# Patient Record
Sex: FEMALE | Race: WHITE | Employment: OTHER | URBAN - METROPOLITAN AREA
[De-identification: names, ages, dates, MRNs, and addresses within clinical notes are randomized per-mention and may not be internally consistent; named-entity substitution may affect disease eponyms.]

---

## 2017-01-05 ENCOUNTER — TELEPHONE (OUTPATIENT)
Dept: INTERNAL MEDICINE CLINIC | Facility: CLINIC | Age: 82
End: 2017-01-05

## 2017-01-06 RX ORDER — ALPRAZOLAM 0.25 MG/1
TABLET ORAL
Qty: 30 TABLET | Refills: 0 | Status: SHIPPED | OUTPATIENT
Start: 2017-01-06 | End: 2017-02-10

## 2017-01-06 NOTE — TELEPHONE ENCOUNTER
[facility] pharmacy is requesting a refill for  Alprazolam 0.25 mg.  Request fwd to Dr. Pricilla Iqbal

## 2017-01-24 ENCOUNTER — OFFICE VISIT (OUTPATIENT)
Dept: INTERNAL MEDICINE CLINIC | Facility: CLINIC | Age: 82
End: 2017-01-24

## 2017-01-24 VITALS
HEART RATE: 83 BPM | TEMPERATURE: 98 F | WEIGHT: 196 LBS | SYSTOLIC BLOOD PRESSURE: 127 MMHG | DIASTOLIC BLOOD PRESSURE: 76 MMHG | BODY MASS INDEX: 36 KG/M2

## 2017-01-24 DIAGNOSIS — R05.9 COUGH: Primary | ICD-10-CM

## 2017-01-24 PROCEDURE — G0463 HOSPITAL OUTPT CLINIC VISIT: HCPCS | Performed by: INTERNAL MEDICINE

## 2017-01-24 PROCEDURE — 99214 OFFICE O/P EST MOD 30 MIN: CPT | Performed by: INTERNAL MEDICINE

## 2017-01-24 RX ORDER — DOXYCYCLINE 100 MG/1
100 TABLET ORAL 2 TIMES DAILY
Qty: 14 TABLET | Refills: 0 | Status: SHIPPED | OUTPATIENT
Start: 2017-01-24 | End: 2018-01-26 | Stop reason: ALTCHOICE

## 2017-01-24 RX ORDER — ALBUTEROL SULFATE 90 UG/1
2 AEROSOL, METERED RESPIRATORY (INHALATION) EVERY 6 HOURS PRN
Qty: 1 INHALER | Refills: 0 | Status: SHIPPED | OUTPATIENT
Start: 2017-01-24 | End: 2018-08-01

## 2017-01-24 NOTE — PROGRESS NOTES
HPI:    Patient ID: Tammie Amador is a 80year old female. Cough  This is a new problem. The current episode started in the past 7 days. The problem has been unchanged. The problem occurs hourly. The cough is productive of sputum.  Associated symptoms (PREVACID) 15 MG Oral Capsule Delayed Release Take 15 mg by mouth daily. Disp:  Rfl:    Red Yeast Rice 600 MG Oral Cap Take 2 tablets by mouth daily.  Disp:  Rfl:    HYDROcodone-acetaminophen 5-325 MG Oral Tab Take one tab po at night Disp: 30 tablet Rfl: 0 week for her bronchitis. Spoke with pt and advised of above. Call back if symptoms persist/worsens. No orders of the defined types were placed in this encounter.        Meds This Visit:  No prescriptions requested or ordered in this encounter    Imaging

## 2017-01-29 RX ORDER — GABAPENTIN 300 MG/1
CAPSULE ORAL
Qty: 90 CAPSULE | Refills: 2 | Status: SHIPPED | OUTPATIENT
Start: 2017-01-29 | End: 2017-06-13

## 2017-02-08 RX ORDER — GLIPIZIDE 5 MG/1
TABLET, FILM COATED, EXTENDED RELEASE ORAL
Qty: 90 TABLET | Refills: 0 | Status: SHIPPED | OUTPATIENT
Start: 2017-02-08 | End: 2017-05-09

## 2017-02-10 RX ORDER — ALPRAZOLAM 0.25 MG/1
TABLET ORAL
Qty: 30 TABLET | Refills: 0 | Status: SHIPPED | OUTPATIENT
Start: 2017-02-10 | End: 2017-03-28

## 2017-03-27 ENCOUNTER — TELEPHONE (OUTPATIENT)
Dept: INTERNAL MEDICINE CLINIC | Facility: CLINIC | Age: 82
End: 2017-03-27

## 2017-03-27 NOTE — TELEPHONE ENCOUNTER
Current Outpatient Prescriptions:  ALPRAZOLAM 0.25 MG Oral Tab TAKE 1 TABLET BY MOUTH EVERY DAY AS NEEDED Disp: 30 tablet Rfl: 0

## 2017-03-28 RX ORDER — ALPRAZOLAM 0.25 MG/1
TABLET ORAL
Qty: 30 TABLET | Refills: 0 | Status: SHIPPED | OUTPATIENT
Start: 2017-03-28 | End: 2017-05-09

## 2017-04-11 ENCOUNTER — TELEPHONE (OUTPATIENT)
Dept: INTERNAL MEDICINE CLINIC | Facility: CLINIC | Age: 82
End: 2017-04-11

## 2017-04-11 NOTE — TELEPHONE ENCOUNTER
Had a stroke in August 2016 and was seeing Doctor sAter Davila.      Doctor Aster Davila moved to [facility] - too far for her to travel and looking for a new neurologist for patient - asking for your recommendations - please advise     Prefers [facility] or N

## 2017-04-12 NOTE — TELEPHONE ENCOUNTER
Other neurologist I had consulted before was Dr Sukumar Delacruz who is also at [facility] so we can refer her.

## 2017-04-13 ENCOUNTER — OFFICE VISIT (OUTPATIENT)
Dept: INTERNAL MEDICINE CLINIC | Facility: CLINIC | Age: 82
End: 2017-04-13

## 2017-04-13 VITALS
WEIGHT: 198 LBS | DIASTOLIC BLOOD PRESSURE: 71 MMHG | HEIGHT: 62 IN | RESPIRATION RATE: 18 BRPM | BODY MASS INDEX: 36.44 KG/M2 | TEMPERATURE: 98 F | SYSTOLIC BLOOD PRESSURE: 132 MMHG | HEART RATE: 87 BPM

## 2017-04-13 DIAGNOSIS — J18.9 PNEUMONIA, COMMUNITY ACQUIRED: Primary | ICD-10-CM

## 2017-04-13 PROCEDURE — G0463 HOSPITAL OUTPT CLINIC VISIT: HCPCS | Performed by: INTERNAL MEDICINE

## 2017-04-13 PROCEDURE — 99213 OFFICE O/P EST LOW 20 MIN: CPT | Performed by: INTERNAL MEDICINE

## 2017-04-13 NOTE — PROGRESS NOTES
HPI:    Patient ID: Rudyddlinda Peña is a 80year old female. Pneumonia  She complains of cough, difficulty breathing and shortness of breath. This is a new problem. The current episode started 1 to 4 weeks ago. The problem has been rapidly improving.  Milagro Mcintyre mouth daily. Disp: 90 tablet Rfl: 0   AmLODIPine Besylate (NORVASC) 5 MG Oral Tab Take one tab po bid Disp: 180 tablet Rfl: 1   atenolol (TENORMIN) 50 MG Oral Tab Take 50 mg by mouth daily.  Disp:  Rfl:    aspirin 81 MG Oral Tab Take 1 tablet by mouth daily Pneumonia, community acquired  (primary encounter diagnosis)  Plan: pt was treated for pneumonia at [facility] after she went to ER upon my advise with complaint of coughing,fevers and dyspea. She is feeling better and finishing course of abx.  She will also be f

## 2017-05-03 NOTE — TELEPHONE ENCOUNTER
[facility] stating that pt ill need a refill on all 4 medications. Pt is new to the [facility].      Current Outpatient Prescriptions:  ALPRAZOLAM 0.25 MG Oral Tab TAKE 1 TABLET BY MOUTH EVERY DAY AS NEEDED Disp: 30 tablet Rfl: 0   GLIPIZIDE ER 5 MG Oral Tabl

## 2017-05-08 NOTE — TELEPHONE ENCOUNTER
Pt stts she only need to have these 2 meds refilled   Pt [place] a new pharm [facility] [place]    RX Alprazolam 0.25 Mg oral tab  RX Glipizide ER 5 mg oral Tab    Please advise

## 2017-05-10 ENCOUNTER — TELEPHONE (OUTPATIENT)
Dept: FAMILY MEDICINE CLINIC | Facility: CLINIC | Age: 82
End: 2017-05-10

## 2017-05-10 RX ORDER — GLIPIZIDE 5 MG/1
TABLET, FILM COATED, EXTENDED RELEASE ORAL
Qty: 90 TABLET | Refills: 0 | Status: SHIPPED | OUTPATIENT
Start: 2017-05-10 | End: 2017-09-06

## 2017-05-10 RX ORDER — ALPRAZOLAM 0.25 MG/1
TABLET ORAL
Qty: 30 TABLET | Refills: 0 | Status: SHIPPED | OUTPATIENT
Start: 2017-05-10 | End: 2017-06-22

## 2017-05-10 NOTE — TELEPHONE ENCOUNTER
Per printed script from Dr. Raul Tavares, refill called to [facility] on file for Alprazolam 0.25 mg tab,qty # 30 with no additional refills. Phone call to patient and advised script called pharmacy. Patient states thank you.

## 2017-05-10 NOTE — TELEPHONE ENCOUNTER
Pharm is calling state in pt chart she is allergic to sulfa want to make sure it ok for pt to take GlipiZIDE ER 5 MG Oral Tablet 24 Hr  Pharm is requesting a call back

## 2017-05-10 NOTE — TELEPHONE ENCOUNTER
Dr. Silas Vasquez, Rx request for Alprazolam 0.25 mg, UNABLE to fill per protocol. Please review. Thank you.     Refill Protocol Appointment Criteria  · Appointment scheduled in the past 6 months or in the next 3 months  Recent Visits       Provider Department

## 2017-05-12 NOTE — TELEPHONE ENCOUNTER
[facility] pharmacy contacted and relayed MD message below about the Glipizide Rx question. Pharmacist states that the pt told them that she has been taking Glipizide and to disregard the call.

## 2017-05-23 ENCOUNTER — OFFICE VISIT (OUTPATIENT)
Dept: INTERNAL MEDICINE CLINIC | Facility: CLINIC | Age: 82
End: 2017-05-23

## 2017-05-23 VITALS
HEART RATE: 91 BPM | WEIGHT: 201 LBS | RESPIRATION RATE: 12 BRPM | SYSTOLIC BLOOD PRESSURE: 123 MMHG | HEIGHT: 62 IN | DIASTOLIC BLOOD PRESSURE: 76 MMHG | TEMPERATURE: 98 F | BODY MASS INDEX: 36.99 KG/M2

## 2017-05-23 DIAGNOSIS — M06.9 RHEUMATOID ARTHRITIS INVOLVING MULTIPLE SITES, UNSPECIFIED RHEUMATOID FACTOR PRESENCE: ICD-10-CM

## 2017-05-23 DIAGNOSIS — B37.0 ORAL THRUSH: Primary | ICD-10-CM

## 2017-05-23 PROCEDURE — 99214 OFFICE O/P EST MOD 30 MIN: CPT | Performed by: INTERNAL MEDICINE

## 2017-05-23 PROCEDURE — G0463 HOSPITAL OUTPT CLINIC VISIT: HCPCS | Performed by: INTERNAL MEDICINE

## 2017-05-23 RX ORDER — HYDROCODONE BITARTRATE AND ACETAMINOPHEN 5; 325 MG/1; MG/1
TABLET ORAL
Qty: 30 TABLET | Refills: 0 | Status: SHIPPED | OUTPATIENT
Start: 2017-05-23 | End: 2018-07-02

## 2017-05-23 RX ORDER — NYSTATIN 100000 [USP'U]/G
1 POWDER TOPICAL 2 TIMES DAILY
Qty: 100 G | Refills: 0 | Status: SHIPPED | OUTPATIENT
Start: 2017-05-23 | End: 2020-04-08

## 2017-05-23 NOTE — PROGRESS NOTES
HPI:    Patient ID: Beatriz Yeh is a 80year old female. Sore Throat   This is a new problem. The current episode started in the past 7 days. The problem has been unchanged. There has been no fever. The pain is moderate.  Pertinent negatives include 15 MG Oral Capsule Delayed Release Take 15 mg by mouth daily. Disp:  Rfl:    Red Yeast Rice 600 MG Oral Cap Take 2 tablets by mouth daily. Disp:  Rfl:    Doxycycline Monohydrate 100 MG Oral Tab Take 100 mg by mouth 2 (two) times daily.  Disp: 14 tablet Rfl: factor presence ([address]  Plan: RHEUMATOLOGY - EXTERNAL        Pt got upset with her current rheumatologist when she asked for pain meds given severe pain intermittently on her right knee and left shoulder since aparrently didn't want to give her pain meds an

## 2017-06-10 NOTE — TELEPHONE ENCOUNTER
Pt is calling to check status of her   Pharm called this in on Monday      Current Outpatient Prescriptions:  GABAPENTIN 300 MG Oral Cap TAKE 1 CAPSULE (300 MG TOTAL) BY MOUTH 3 (THREE) TIMES DAILY.  Disp: 90 capsule Rfl: 2

## 2017-06-13 NOTE — TELEPHONE ENCOUNTER
Pt calling to f/u on refill request. Pt is out of medication. Been waiting on refill for a week now. Needs this refill asap.

## 2017-06-13 NOTE — TELEPHONE ENCOUNTER
[facility] calling checking status of refill, pt is out of meds. Pharmacy states they have faxed request various times.

## 2017-06-13 NOTE — TELEPHONE ENCOUNTER
Refill Protocol Appointment Criteria  · Appointment scheduled in the past 6 months or in the next 3 months  Recent Visits       Provider Department Primary Dx    3 weeks ago Yue Perez MD [facility], [facility], [address], [place] Oral thrush    2 mo

## 2017-06-14 RX ORDER — GABAPENTIN 300 MG/1
CAPSULE ORAL
Qty: 90 CAPSULE | Refills: 2 | Status: SHIPPED | OUTPATIENT
Start: 2017-06-14 | End: 2017-10-13

## 2017-06-20 NOTE — TELEPHONE ENCOUNTER
[facility]illo calling to get refill on medication below. Chelsi states electronic fax failed.      Current outpatient prescriptions:   •  ALPRAZolam 0.25 MG Oral Tab, TAKE 1 TABLET BY MOUTH EVERY DAY AS NEEDED, Disp: 30 tablet, Rfl: 0

## 2017-06-22 RX ORDER — ALPRAZOLAM 0.25 MG/1
TABLET ORAL
Qty: 30 TABLET | Refills: 0 | OUTPATIENT
Start: 2017-06-22 | End: 2017-08-14

## 2017-07-25 ENCOUNTER — TELEPHONE (OUTPATIENT)
Dept: INTERNAL MEDICINE CLINIC | Facility: CLINIC | Age: 82
End: 2017-07-25

## 2017-07-25 NOTE — TELEPHONE ENCOUNTER
Form for Diabetic Shoe Program completed by Dr. Lucien Santacruz, faxed and confirmed sent. Original placed for scanning to chart.

## 2017-08-10 NOTE — TELEPHONE ENCOUNTER
Pharmacy calling to request refill for ALPRAZolam 0.25 MG Oral Tab      Current Outpatient Prescriptions:  ALPRAZolam 0.25 MG Oral Tab TAKE 1 TABLET BY MOUTH EVERY DAY AS NEEDED Disp: 30 tablet Rfl: 0

## 2017-08-11 NOTE — TELEPHONE ENCOUNTER
Pt calling to check status of refill and states out of medication and going away this weekend and need medication before she leaves.

## 2017-08-14 NOTE — TELEPHONE ENCOUNTER
Please advise on refill request.     Recent Outpatient Visits            2 months ago Oral thrush    [address] MD    Office Visit    4 months ago Pneumonia, community acquired    [address]er

## 2017-08-15 RX ORDER — ALPRAZOLAM 0.25 MG/1
TABLET ORAL
Qty: 30 TABLET | Refills: 0 | OUTPATIENT
Start: 2017-08-15 | End: 2017-09-29

## 2017-08-15 NOTE — TELEPHONE ENCOUNTER
Refill approved by Dr. Lucien Santacruz called to pharmacy for Alprazolam 0.25 mg , qty # 30 with no additional refills.

## 2017-09-01 NOTE — TELEPHONE ENCOUNTER
Pharmacy calling to request a refill for       Current Outpatient Prescriptions:  GlipiZIDE ER 5 MG Oral Tablet 24 Hr TAKE 1 TABLET BY MOUTH EVERY DAY IN THE MORNING WITH BREAKFAST Disp: 90 tablet Rfl: 0

## 2017-09-06 NOTE — TELEPHONE ENCOUNTER
Diabetes Medications  Protocol Criteria:  · Appointment scheduled in the past 6 months or the next 3 months  · A1C < 7.5 in the past 6 months  · Creatinine in the past 12 months  · Creatinine result < 1.5   Recent Outpatient Visits            3 months ago

## 2017-09-07 RX ORDER — GLIPIZIDE 5 MG/1
TABLET, FILM COATED, EXTENDED RELEASE ORAL
Qty: 90 TABLET | Refills: 0 | Status: SHIPPED | OUTPATIENT
Start: 2017-09-07 | End: 2018-01-12

## 2017-09-29 ENCOUNTER — OFFICE VISIT (OUTPATIENT)
Dept: INTERNAL MEDICINE CLINIC | Facility: CLINIC | Age: 82
End: 2017-09-29

## 2017-09-29 VITALS
RESPIRATION RATE: 12 BRPM | OXYGEN SATURATION: 96 % | HEIGHT: 61 IN | TEMPERATURE: 99 F | BODY MASS INDEX: 37.76 KG/M2 | HEART RATE: 81 BPM | SYSTOLIC BLOOD PRESSURE: 144 MMHG | WEIGHT: 200 LBS | DIASTOLIC BLOOD PRESSURE: 76 MMHG

## 2017-09-29 DIAGNOSIS — K12.0 CANKER SORE: Primary | ICD-10-CM

## 2017-09-29 DIAGNOSIS — J06.9 VIRAL UPPER RESPIRATORY TRACT INFECTION: ICD-10-CM

## 2017-09-29 PROCEDURE — 99214 OFFICE O/P EST MOD 30 MIN: CPT | Performed by: INTERNAL MEDICINE

## 2017-09-29 PROCEDURE — G0463 HOSPITAL OUTPT CLINIC VISIT: HCPCS | Performed by: INTERNAL MEDICINE

## 2017-09-29 RX ORDER — ALPRAZOLAM 0.25 MG/1
TABLET ORAL
Qty: 30 TABLET | Refills: 0 | Status: SHIPPED | OUTPATIENT
Start: 2017-09-29 | End: 2017-11-07

## 2017-09-29 NOTE — PROGRESS NOTES
HPI:    Patient ID: Javier Jose is a [de-identified]year old female. Cough   This is a new problem. The current episode started in the past 7 days. The problem has been gradually improving. The cough is productive of sputum and productive of purulent sputum.  As (two) times daily. Disp: 14 tablet Rfl: 0   Metoprolol Succinate ER 50 MG Oral Tablet 24 Hr 50 mg daily. Disp:  Rfl:    Sennosides-Docusate Sodium (SENNA PLUS) 8.6-50 MG Oral Tab Take 2 tablets by mouth daily.  Disp: 60 tablet Rfl: 1   folic acid 1 MG Ora distress. She has no wheezes. She has no rales. Abdominal: Bowel sounds are normal. She exhibits no distension. There is no tenderness. There is no rebound. Musculoskeletal: She exhibits no edema. Lymphadenopathy:     She has no cervical adenopathy.

## 2017-10-13 RX ORDER — GABAPENTIN 300 MG/1
CAPSULE ORAL
Qty: 270 CAPSULE | Refills: 1 | Status: SHIPPED | OUTPATIENT
Start: 2017-10-13 | End: 2018-02-28

## 2017-10-13 NOTE — TELEPHONE ENCOUNTER
Please advise on refill request      Refill Protocol Appointment Criteria  · Appointment scheduled in the past 6 months or in the next 3 months  Recent Outpatient Visits            2 weeks ago Gaudencio Sr, [address] [facility]

## 2017-11-07 RX ORDER — ALPRAZOLAM 0.25 MG/1
TABLET ORAL
Qty: 30 TABLET | Refills: 0 | OUTPATIENT
Start: 2017-11-07 | End: 2018-01-22

## 2017-11-08 RX ORDER — ALPRAZOLAM 0.25 MG/1
TABLET ORAL
Qty: 30 TABLET | Refills: 0 | OUTPATIENT
Start: 2017-11-08

## 2017-11-15 RX ORDER — ALPRAZOLAM 0.25 MG/1
TABLET ORAL
Qty: 30 TABLET | Refills: 0 | OUTPATIENT
Start: 2017-11-15

## 2017-11-15 NOTE — TELEPHONE ENCOUNTER
Refill authorized on 2017    Patient Information     Patient Name  Pablo Mendoza MRN  [id number] Sex  Female   1935   Order Information     Ordered Status Priority Ordering User Department   17 (none) (none) Quentin Lea MD E

## 2017-12-09 RX ORDER — GLIPIZIDE 5 MG/1
TABLET, FILM COATED, EXTENDED RELEASE ORAL
Qty: 90 TABLET | Refills: 0 | Status: SHIPPED | OUTPATIENT
Start: 2017-12-09 | End: 2018-03-10

## 2017-12-09 NOTE — TELEPHONE ENCOUNTER
Diabetes Medications  Failed protocol, please advise on prescription.  No HbA1C and Creatinine result > 6 months    Rx pended for MD approval.    Protocol Criteria:  · Appointment scheduled in the past 6 months or the next 3 months  · A1C < 7.5 in the past

## 2018-01-12 ENCOUNTER — OFFICE VISIT (OUTPATIENT)
Dept: INTERNAL MEDICINE CLINIC | Facility: CLINIC | Age: 83
End: 2018-01-12

## 2018-01-12 VITALS
WEIGHT: 204 LBS | SYSTOLIC BLOOD PRESSURE: 118 MMHG | HEIGHT: 62 IN | BODY MASS INDEX: 37.54 KG/M2 | OXYGEN SATURATION: 91 % | TEMPERATURE: 100 F | HEART RATE: 77 BPM | RESPIRATION RATE: 12 BRPM | DIASTOLIC BLOOD PRESSURE: 78 MMHG

## 2018-01-12 DIAGNOSIS — J06.9 VIRAL UPPER RESPIRATORY TRACT INFECTION: Primary | ICD-10-CM

## 2018-01-12 PROCEDURE — 99213 OFFICE O/P EST LOW 20 MIN: CPT | Performed by: INTERNAL MEDICINE

## 2018-01-12 PROCEDURE — G0463 HOSPITAL OUTPT CLINIC VISIT: HCPCS | Performed by: INTERNAL MEDICINE

## 2018-01-12 NOTE — PROGRESS NOTES
HPI:    Patient ID: Sindy Pino is a [de-identified]year old female. Cough   This is a new problem. The current episode started yesterday. The problem has been unchanged. The cough is productive of sputum.  Associated symptoms include chills, nasal congestion, p MG Oral Tablet 24 Hr 50 mg daily. Disp:  Rfl:    Sennosides-Docusate Sodium (SENNA PLUS) 8.6-50 MG Oral Tab Take 2 tablets by mouth daily. Disp: 60 tablet Rfl: 1   folic acid 1 MG Oral Tab Take 1 tablet (1 mg total) by mouth daily.  Disp: 90 tablet Rfl: 0 and breath sounds normal. No respiratory distress. She has no wheezes. She has no rales. Abdominal: Soft. Bowel sounds are normal. She exhibits no distension. Musculoskeletal: She exhibits no edema.    Lymphadenopathy:     She has no cervical adenopathy

## 2018-01-22 ENCOUNTER — TELEPHONE (OUTPATIENT)
Dept: INTERNAL MEDICINE CLINIC | Facility: CLINIC | Age: 83
End: 2018-01-22

## 2018-01-22 NOTE — TELEPHONE ENCOUNTER
Genevive calling to let Dr Bruna Meza know that she has started a plan of care today. And would like order for A1C and social working.   Gerson Chaparro also has question regarding her medication

## 2018-01-24 NOTE — TELEPHONE ENCOUNTER
Spoke with [facility], informed her per Dr. Bryan Castellano with him. Dr. Rambo Moar pt was at [facility] for Pneumonia ( has a fup appt with you 1/26).  anil harrison Pt is taking abx bactrim that was given at hospital. On those discharge papers

## 2018-01-25 ENCOUNTER — TELEPHONE (OUTPATIENT)
Dept: INTERNAL MEDICINE CLINIC | Facility: CLINIC | Age: 83
End: 2018-01-25

## 2018-01-25 RX ORDER — ALPRAZOLAM 0.25 MG/1
TABLET ORAL
Qty: 30 TABLET | Refills: 0 | OUTPATIENT
Start: 2018-01-25 | End: 2018-05-01

## 2018-01-26 ENCOUNTER — TELEPHONE (OUTPATIENT)
Dept: INTERNAL MEDICINE CLINIC | Facility: CLINIC | Age: 83
End: 2018-01-26

## 2018-01-26 ENCOUNTER — OFFICE VISIT (OUTPATIENT)
Dept: INTERNAL MEDICINE CLINIC | Facility: CLINIC | Age: 83
End: 2018-01-26

## 2018-01-26 VITALS
SYSTOLIC BLOOD PRESSURE: 130 MMHG | RESPIRATION RATE: 16 BRPM | DIASTOLIC BLOOD PRESSURE: 68 MMHG | OXYGEN SATURATION: 93 % | HEART RATE: 76 BPM | BODY MASS INDEX: 37 KG/M2 | TEMPERATURE: 98 F | WEIGHT: 202 LBS

## 2018-01-26 DIAGNOSIS — I10 ESSENTIAL HYPERTENSION: ICD-10-CM

## 2018-01-26 DIAGNOSIS — E11.9 CONTROLLED TYPE 2 DIABETES MELLITUS WITHOUT COMPLICATION, WITHOUT LONG-TERM CURRENT USE OF INSULIN (HCC): ICD-10-CM

## 2018-01-26 DIAGNOSIS — J18.9 COMMUNITY ACQUIRED PNEUMONIA, UNSPECIFIED LATERALITY: Primary | ICD-10-CM

## 2018-01-26 PROCEDURE — G0463 HOSPITAL OUTPT CLINIC VISIT: HCPCS | Performed by: INTERNAL MEDICINE

## 2018-01-26 PROCEDURE — 99214 OFFICE O/P EST MOD 30 MIN: CPT | Performed by: INTERNAL MEDICINE

## 2018-01-26 RX ORDER — SULFAMETHOXAZOLE AND TRIMETHOPRIM 800; 160 MG/1; MG/1
1 TABLET ORAL 2 TIMES DAILY
Refills: 0 | COMMUNITY
Start: 2018-01-20 | End: 2018-08-01 | Stop reason: ALTCHOICE

## 2018-01-26 RX ORDER — BENZONATATE 200 MG/1
1 CAPSULE ORAL 3 TIMES DAILY
Refills: 0 | COMMUNITY
Start: 2018-01-20 | End: 2018-07-02 | Stop reason: ALTCHOICE

## 2018-01-26 NOTE — PROGRESS NOTES
HPI:    Patient ID: Robson Thornton is a [de-identified]year old female. Pneumonia   She complains of cough and shortness of breath. This is a new problem. The current episode started 1 to 4 weeks ago. The problem has been gradually improving.  The cough is non-prod Rfl: 0   Omega-3 Fatty Acids (FISH OIL) 1200 MG Oral Capsule Delayed Release Take  by mouth. Disp:  Rfl:    aspirin 81 MG Oral Tab Take 1 tablet by mouth daily.  Disp:  Rfl:    Calcium Carb-Cholecalciferol (CALCIUM + D3) 600-200 MG-UNIT Oral Tab Take 1 tabl ASSESSMENT/PLAN:   (J18.9) Community acquired pneumonia, unspecified laterality  (primary encounter diagnosis)  Plan: XR CHEST PA + LAT CHEST (CPT=71046)        Pt already improving with current abx.  She will need ffup cxr in 4 to 6 weeks so o

## 2018-01-26 NOTE — TELEPHONE ENCOUNTER
Pt requesting ALPRAZOLAM be called into pharmacy  Out of meds    [facility]irmed [facility]  Pt requesting call to confirm once pharmacy notified

## 2018-02-07 ENCOUNTER — TELEPHONE (OUTPATIENT)
Dept: INTERNAL MEDICINE CLINIC | Facility: CLINIC | Age: 83
End: 2018-02-07

## 2018-02-16 ENCOUNTER — TELEPHONE (OUTPATIENT)
Dept: INTERNAL MEDICINE CLINIC | Facility: CLINIC | Age: 83
End: 2018-02-16

## 2018-02-28 NOTE — TELEPHONE ENCOUNTER
Pt calling checking status of refill to mail orders pharmacy. Pt states she is low on meds and needs rush if possible. Current Outpatient Prescriptions:  GABAPENTIN 300 MG Oral Cap TAKE 1 CAPSULE (300 MG TOTAL) BY MOUTH 3 (THREE) TIMES DAILY.  Disp: 2

## 2018-02-28 NOTE — TELEPHONE ENCOUNTER
LR 10/13/17 please advise  · Appointment scheduled in the past 6 months or in the next 3 months  Recent Outpatient Visits            1 month ago Community acquired pneumonia, unspecified laterality    [facility] [place], [facility], [place] [place]

## 2018-03-01 RX ORDER — GABAPENTIN 300 MG/1
CAPSULE ORAL
Qty: 270 CAPSULE | Refills: 0 | Status: SHIPPED | OUTPATIENT
Start: 2018-03-01 | End: 2018-03-26

## 2018-03-06 ENCOUNTER — TELEPHONE (OUTPATIENT)
Dept: GASTROENTEROLOGY | Facility: CLINIC | Age: 83
End: 2018-03-06

## 2018-03-07 ENCOUNTER — TELEPHONE (OUTPATIENT)
Dept: GASTROENTEROLOGY | Facility: CLINIC | Age: 83
End: 2018-03-07

## 2018-03-07 NOTE — TELEPHONE ENCOUNTER
Office note from [address] placed on my desk - I can not find that I have seen this pt in the past.  RN to investigate.

## 2018-03-08 NOTE — TELEPHONE ENCOUNTER
Dr Lilliana Valencia gave letter back to me. I contacted Dr Anna Guevara office and got fax [phone]. Faxed letter to him, with note that it came to us in error.

## 2018-03-08 NOTE — TELEPHONE ENCOUNTER
Dr Eula Galeas, I do not see where this pt has or is scheduled to see you. Pt's PCP, Dr. lEa Bella, did refer this pt to external rheumatology on 05/23/17. Possibly this was sent to you in error? Do you still have the letter?  If so, the nurses can try to inv

## 2018-03-10 RX ORDER — GLIPIZIDE 5 MG/1
TABLET, FILM COATED, EXTENDED RELEASE ORAL
Qty: 90 TABLET | Refills: 0 | Status: SHIPPED | OUTPATIENT
Start: 2018-03-10 | End: 2018-06-09

## 2018-03-26 RX ORDER — GABAPENTIN 300 MG/1
CAPSULE ORAL
Qty: 270 CAPSULE | Refills: 1 | Status: SHIPPED | OUTPATIENT
Start: 2018-03-26 | End: 2018-09-19

## 2018-05-01 RX ORDER — ALPRAZOLAM 0.25 MG/1
TABLET ORAL
Qty: 30 TABLET | Refills: 0 | OUTPATIENT
Start: 2018-05-01 | End: 2018-05-02

## 2018-05-03 RX ORDER — ALPRAZOLAM 0.25 MG/1
TABLET ORAL
Qty: 30 TABLET | Refills: 0 | OUTPATIENT
Start: 2018-05-03 | End: 2018-06-14

## 2018-06-09 RX ORDER — GLIPIZIDE 5 MG/1
TABLET, FILM COATED, EXTENDED RELEASE ORAL
Qty: 90 TABLET | Refills: 0 | Status: SHIPPED | OUTPATIENT
Start: 2018-06-09 | End: 2018-07-14

## 2018-06-15 RX ORDER — ALPRAZOLAM 0.25 MG/1
TABLET ORAL
Qty: 30 TABLET | Refills: 0 | Status: SHIPPED | OUTPATIENT
Start: 2018-06-15 | End: 2018-08-01

## 2018-06-15 NOTE — TELEPHONE ENCOUNTER
Approved refill for Alprazolam 0.25 mg tab, qty #30 with no additional refills called to [facility] as directed.

## 2018-07-02 ENCOUNTER — OFFICE VISIT (OUTPATIENT)
Dept: INTERNAL MEDICINE CLINIC | Facility: CLINIC | Age: 83
End: 2018-07-02

## 2018-07-02 VITALS
DIASTOLIC BLOOD PRESSURE: 78 MMHG | RESPIRATION RATE: 12 BRPM | HEART RATE: 80 BPM | HEIGHT: 62 IN | BODY MASS INDEX: 37.54 KG/M2 | WEIGHT: 204 LBS | SYSTOLIC BLOOD PRESSURE: 138 MMHG | TEMPERATURE: 97 F

## 2018-07-02 DIAGNOSIS — M25.512 CHRONIC LEFT SHOULDER PAIN: ICD-10-CM

## 2018-07-02 DIAGNOSIS — R09.81 NASAL CONGESTION: Primary | ICD-10-CM

## 2018-07-02 DIAGNOSIS — I10 ESSENTIAL HYPERTENSION: ICD-10-CM

## 2018-07-02 DIAGNOSIS — G89.29 CHRONIC LEFT SHOULDER PAIN: ICD-10-CM

## 2018-07-02 DIAGNOSIS — E78.2 MIXED HYPERLIPIDEMIA: ICD-10-CM

## 2018-07-02 DIAGNOSIS — E11.9 CONTROLLED TYPE 2 DIABETES MELLITUS WITHOUT COMPLICATION, WITHOUT LONG-TERM CURRENT USE OF INSULIN (HCC): ICD-10-CM

## 2018-07-02 PROCEDURE — 99214 OFFICE O/P EST MOD 30 MIN: CPT | Performed by: INTERNAL MEDICINE

## 2018-07-02 PROCEDURE — G0463 HOSPITAL OUTPT CLINIC VISIT: HCPCS | Performed by: INTERNAL MEDICINE

## 2018-07-02 RX ORDER — FLUTICASONE PROPIONATE 50 MCG
2 SPRAY, SUSPENSION (ML) NASAL DAILY
Qty: 1 BOTTLE | Refills: 0 | Status: SHIPPED | OUTPATIENT
Start: 2018-07-02 | End: 2019-06-27

## 2018-07-02 RX ORDER — VALSARTAN 80 MG/1
1 TABLET ORAL DAILY
COMMUNITY
Start: 2018-06-27 | End: 2018-08-01

## 2018-07-02 RX ORDER — HYDROCODONE BITARTRATE AND ACETAMINOPHEN 5; 325 MG/1; MG/1
TABLET ORAL
Qty: 20 TABLET | Refills: 0 | Status: SHIPPED | OUTPATIENT
Start: 2018-07-02 | End: 2020-05-13 | Stop reason: ALTCHOICE

## 2018-07-02 RX ORDER — FLUTICASONE PROPIONATE 50 MCG
2 SPRAY, SUSPENSION (ML) NASAL DAILY
Qty: 1 BOTTLE | Refills: 0 | Status: SHIPPED | OUTPATIENT
Start: 2018-07-02 | End: 2018-07-02

## 2018-07-02 NOTE — PROGRESS NOTES
HPI:    Patient ID: Nereida Silva is a [de-identified]year old female. Hypertension   This is a chronic problem. The current episode started more than 1 year ago. The problem has been gradually improving since onset. The problem is controlled.  Pertinent negatives pain is described as aching. The pain is moderate. Associated symptoms include a limited range of motion and stiffness. Exacerbated by: rom. She has tried acetaminophen for the symptoms. The treatment provided no relief.  Her past medical history is signifi (CALCIUM + D3) 600-200 MG-UNIT Oral Tab Take 1 tablet by mouth daily. Disp:  Rfl:    Red Yeast Rice 600 MG Oral Cap Take 2 tablets by mouth daily.  Disp:  Rfl:    Sulfamethoxazole-TMP -160 MG Oral Tab per tablet Take 1 tablet by mouth 2 (two) times da and no deformity. Tenderness on the left AC joint shoulder; decreased ROM of abduction/rotations. ?+moran sign   Lymphadenopathy:     She has no cervical adenopathy. Skin: She is not diaphoretic.               ASSESSMENT/PLAN:   (R09.81) Nasal congest

## 2018-07-14 ENCOUNTER — TELEPHONE (OUTPATIENT)
Dept: INTERNAL MEDICINE CLINIC | Facility: CLINIC | Age: 83
End: 2018-07-14

## 2018-07-14 RX ORDER — GLIPIZIDE 5 MG/1
TABLET, FILM COATED, EXTENDED RELEASE ORAL
Qty: 90 TABLET | Refills: 0 | Status: SHIPPED | OUTPATIENT
Start: 2018-07-14 | End: 2018-09-29

## 2018-07-18 ENCOUNTER — TELEPHONE (OUTPATIENT)
Dept: INTERNAL MEDICINE CLINIC | Facility: CLINIC | Age: 83
End: 2018-07-18

## 2018-07-26 ENCOUNTER — TELEPHONE (OUTPATIENT)
Dept: OTHER | Age: 83
End: 2018-07-26

## 2018-08-01 ENCOUNTER — PATIENT OUTREACH (OUTPATIENT)
Dept: CASE MANAGEMENT | Age: 83
End: 2018-08-01

## 2018-08-01 ENCOUNTER — OFFICE VISIT (OUTPATIENT)
Dept: INTERNAL MEDICINE CLINIC | Facility: CLINIC | Age: 83
End: 2018-08-01
Payer: MEDICARE

## 2018-08-01 VITALS
HEIGHT: 64 IN | RESPIRATION RATE: 12 BRPM | DIASTOLIC BLOOD PRESSURE: 80 MMHG | WEIGHT: 175 LBS | SYSTOLIC BLOOD PRESSURE: 134 MMHG | TEMPERATURE: 97 F | BODY MASS INDEX: 29.88 KG/M2 | HEART RATE: 69 BPM

## 2018-08-01 DIAGNOSIS — I10 ESSENTIAL HYPERTENSION: Primary | ICD-10-CM

## 2018-08-01 PROCEDURE — G0463 HOSPITAL OUTPT CLINIC VISIT: HCPCS | Performed by: INTERNAL MEDICINE

## 2018-08-01 PROCEDURE — 99214 OFFICE O/P EST MOD 30 MIN: CPT | Performed by: INTERNAL MEDICINE

## 2018-08-01 RX ORDER — AMLODIPINE BESYLATE 10 MG/1
5 TABLET ORAL 2 TIMES DAILY
COMMUNITY
Start: 2018-07-16 | End: 2020-05-13

## 2018-08-01 RX ORDER — LOSARTAN POTASSIUM 50 MG/1
50 TABLET ORAL DAILY
Qty: 90 TABLET | Refills: 0 | Status: SHIPPED | OUTPATIENT
Start: 2018-08-01 | End: 2018-10-26

## 2018-08-01 RX ORDER — PENICILLIN V POTASSIUM 500 MG/1
250 TABLET ORAL 4 TIMES DAILY
COMMUNITY
Start: 2018-07-29 | End: 2018-10-31 | Stop reason: ALTCHOICE

## 2018-08-01 RX ORDER — ALPRAZOLAM 0.25 MG/1
TABLET ORAL
Qty: 30 TABLET | Refills: 0 | Status: SHIPPED | OUTPATIENT
Start: 2018-08-01 | End: 2018-08-23

## 2018-08-01 NOTE — PROGRESS NOTES
HPI:    Patient ID: Mic Noble is a 82year old female. Patient here today for post ER ffup. Was seen in [facility] ER over the weekend with complaint of bp being too low when she measured her bp at home.  Apparently she was also feeling lightheaded at th Oral Tab Take one tab po at night prn for shoulder pain Disp: 20 tablet Rfl: 0   Fluticasone Propionate 50 MCG/ACT Nasal Suspension 2 sprays by Each Nare route daily.  Disp: 1 Bottle Rfl: 0   GABAPENTIN 300 MG Oral Cap TAKE 1 CAPSULE BY MOUTH 3  TIMES DAILY oropharyngeal exudate. Eyes: Conjunctivae are normal. Right eye exhibits no discharge. Left eye exhibits no discharge. Neck: Normal range of motion. Neck supple. No JVD present. Cardiovascular: Normal rate, regular rhythm and normal heart sounds.

## 2018-08-23 NOTE — TELEPHONE ENCOUNTER
Spoke with patient and states that she still has plenty of  Losartan that was rx last 8/1/18 from [facility], but requesting refill for Alprazolam to be sent to [facility] as well,states that she did not get the paper script when she had her LOV on 8/1/18 with Dr Lin Dominique

## 2018-08-23 NOTE — TELEPHONE ENCOUNTER
Refill request:    •  Losartan Potassium 50 MG Oral Tab, Take 1 tablet (50 mg total) by mouth daily. , Disp: 90 tablet, Rfl: 0  •  ALPRAZolam 0.25 MG Oral Tab, TAKE 1 TABLET BY MOUTH EVERY DAY AS NEEDED, Disp: 30 tablet, Rfl: 0

## 2018-08-24 RX ORDER — ALPRAZOLAM 0.25 MG/1
TABLET ORAL
Qty: 30 TABLET | Refills: 0 | Status: SHIPPED
Start: 2018-08-24 | End: 2018-11-05

## 2018-08-24 NOTE — TELEPHONE ENCOUNTER
Per Printed script from Dr. Melania Block, [facility] 0.25 mg  Tab, qty # 30. Take one tab daily as needed. No additional refills.

## 2018-09-10 ENCOUNTER — PATIENT OUTREACH (OUTPATIENT)
Dept: CASE MANAGEMENT | Age: 83
End: 2018-09-10

## 2018-09-10 RX ORDER — GLIPIZIDE 5 MG/1
TABLET, FILM COATED, EXTENDED RELEASE ORAL
Qty: 90 TABLET | Refills: 0 | Status: SHIPPED | OUTPATIENT
Start: 2018-09-10 | End: 2018-09-29 | Stop reason: SDUPTHER

## 2018-09-11 ENCOUNTER — PATIENT OUTREACH (OUTPATIENT)
Dept: CASE MANAGEMENT | Age: 83
End: 2018-09-11

## 2018-09-11 NOTE — PROGRESS NOTES
Pt. Had asked me to call her back today to talk about the CCM program to see if it might be of interest to her. She was unavailable according to the family member who answered.  He asked me to call back and leave her the VM and he would have her listen to i

## 2018-09-13 ENCOUNTER — PATIENT OUTREACH (OUTPATIENT)
Dept: CASE MANAGEMENT | Age: 83
End: 2018-09-13

## 2018-09-13 NOTE — PROGRESS NOTES
LVM telling pt I would be going on vacation when she was able to try to call me back on, so I informed her I would call her back on the 24th when I get back to the office to discuss the CCM program to see if this is something of interest to her.

## 2018-09-19 RX ORDER — GABAPENTIN 300 MG/1
CAPSULE ORAL
Qty: 270 CAPSULE | Refills: 1 | Status: SHIPPED | OUTPATIENT
Start: 2018-09-19 | End: 2019-11-22

## 2018-09-28 ENCOUNTER — PATIENT OUTREACH (OUTPATIENT)
Dept: CASE MANAGEMENT | Age: 83
End: 2018-09-28

## 2018-09-28 NOTE — PROGRESS NOTES
LVM for pt to call me back at her earliest convenience so that we might discuss if she would be of interest in the CCM program. P= Await call back.

## 2018-09-29 RX ORDER — GLIPIZIDE 5 MG/1
TABLET, FILM COATED, EXTENDED RELEASE ORAL
Qty: 90 TABLET | Refills: 0 | Status: SHIPPED | OUTPATIENT
Start: 2018-09-29 | End: 2019-03-21

## 2018-10-02 ENCOUNTER — TELEPHONE (OUTPATIENT)
Dept: INTERNAL MEDICINE CLINIC | Facility: CLINIC | Age: 83
End: 2018-10-02

## 2018-10-02 NOTE — TELEPHONE ENCOUNTER
Dr. Alexey Chapman [facility] office is requesting to have labs/urine results faxed to the his office   Pt apt is today @ 1  FAX# [phone]  Please advise

## 2018-10-17 ENCOUNTER — PATIENT OUTREACH (OUTPATIENT)
Dept: CASE MANAGEMENT | Age: 83
End: 2018-10-17

## 2018-10-19 ENCOUNTER — PATIENT OUTREACH (OUTPATIENT)
Dept: CASE MANAGEMENT | Age: 83
End: 2018-10-19

## 2018-10-19 NOTE — PROGRESS NOTES
Spoke with pt. We were to complete her initial ccm assessment and she said she said her son just got admitted back to [facility] and is in liver and kidney failure and he is not doing well.  She asked if we could postpone this initial. I did encourage to ensure s

## 2018-10-23 ENCOUNTER — PATIENT OUTREACH (OUTPATIENT)
Dept: CASE MANAGEMENT | Age: 83
End: 2018-10-23

## 2018-10-23 NOTE — PROGRESS NOTES
Spoke to pt to see how her son was doing and if she was ready to make her appt for the initial CCM. She said her son was in bad shape in the hospital and was very ill. She said she simply can't add this to her plate right now of CCM.  She said she appreciat

## 2018-10-26 RX ORDER — LOSARTAN POTASSIUM 50 MG/1
TABLET ORAL
Qty: 90 TABLET | Refills: 0 | Status: SHIPPED | OUTPATIENT
Start: 2018-10-26

## 2018-10-31 ENCOUNTER — OFFICE VISIT (OUTPATIENT)
Dept: FAMILY MEDICINE CLINIC | Facility: CLINIC | Age: 83
End: 2018-10-31
Payer: MEDICARE

## 2018-10-31 ENCOUNTER — PATIENT OUTREACH (OUTPATIENT)
Dept: CASE MANAGEMENT | Age: 83
End: 2018-10-31

## 2018-10-31 VITALS
SYSTOLIC BLOOD PRESSURE: 150 MMHG | RESPIRATION RATE: 16 BRPM | DIASTOLIC BLOOD PRESSURE: 80 MMHG | TEMPERATURE: 98 F | OXYGEN SATURATION: 98 % | HEART RATE: 80 BPM

## 2018-10-31 DIAGNOSIS — R09.81 NASAL CONGESTION: ICD-10-CM

## 2018-10-31 DIAGNOSIS — H66.90 EAR INFECTION: ICD-10-CM

## 2018-10-31 DIAGNOSIS — R05.9 COUGH: ICD-10-CM

## 2018-10-31 DIAGNOSIS — F41.1 GENERALIZED ANXIETY DISORDER: ICD-10-CM

## 2018-10-31 DIAGNOSIS — E11.9 CONTROLLED TYPE 2 DIABETES MELLITUS WITHOUT COMPLICATION, WITHOUT LONG-TERM CURRENT USE OF INSULIN (HCC): ICD-10-CM

## 2018-10-31 DIAGNOSIS — J02.9 PHARYNGITIS, UNSPECIFIED ETIOLOGY: Primary | ICD-10-CM

## 2018-10-31 PROCEDURE — 87081 CULTURE SCREEN ONLY: CPT | Performed by: NURSE PRACTITIONER

## 2018-10-31 PROCEDURE — 87880 STREP A ASSAY W/OPTIC: CPT | Performed by: NURSE PRACTITIONER

## 2018-10-31 PROCEDURE — 99202 OFFICE O/P NEW SF 15 MIN: CPT | Performed by: NURSE PRACTITIONER

## 2018-10-31 PROCEDURE — 99490 CHRNC CARE MGMT STAFF 1ST 20: CPT

## 2018-10-31 NOTE — PROGRESS NOTES
CHIEF COMPLAINT:   Patient presents with:  Sore Throat: for 3 days, has very ill family member does not want to expose them. HPI:   Dasia Galvan is a 82year old female presents to clinic with complaint of sore throat.  Patient has had for 3 days methotrexate (RHEUMATREX) 2.5 MG Oral Tab Take 2.5 mg by mouth. TAKE 6 TAB ONCE A WEEK. PATIENT TAKING EVERY Thursday. Disp:  Rfl:    Lansoprazole (PREVACID) 15 MG Oral Capsule Delayed Release Take 15 mg by mouth daily.  Disp:  Rfl:    Red Yeast Rice 600 M NECK: supple, non-tender  LUNGS: clear to auscultation bilaterally, no wheezes or rhonchi. Breathing is non labored.   CARDIO: RRR without murmur  GI: + BS's, no masses, hepatosplenomegaly, or tenderness on direct palpation  EXTREMITIES: no cyanosis, clubbi A test has been done to find out if you or your child have strep throat. Call this facility or your healthcare provider if you were not given your test results. If the test is positive for strep infection, you will need to take antibiotic medicines.  A pres Other medicine for a child: You can give your child acetaminophen for fever, fussiness, or discomfort. In babies over 7 months of age, you may use ibuprofen instead of acetaminophen.  If your child has chronic liver or kidney disease or ever had a stomach u · Don’t have close contact with people who have sore throats, colds, or other upper respiratory infections. · Don’t smoke, and stay away from secondhand smoke. · Stay up to date with of your vaccines.   Date Last Reviewed: 11/1/2017  © [id number] [employer]

## 2018-10-31 NOTE — PROGRESS NOTES
Spoke to [employer], HIPAA verified for CCM call.     Medical History  Patient Active Problem List:     Nocturnal leg cramps     Status post carotid endarterectomy     Angioedema     Mixed hyperlipidemia     Controlled type 2 diabetes mellitus without complicat time.     Generalized Anxiety Disorder= Pt. Is very anxious. She is dealing with her son who is on hospice and is in the home with her and she is the caregiver. He has liver failure.  She said the plan is for tomorrow for him to go to [facility] for placement o

## 2018-11-05 ENCOUNTER — TELEPHONE (OUTPATIENT)
Dept: OTHER | Age: 83
End: 2018-11-05

## 2018-11-05 ENCOUNTER — OFFICE VISIT (OUTPATIENT)
Dept: INTERNAL MEDICINE CLINIC | Facility: CLINIC | Age: 83
End: 2018-11-05
Payer: MEDICARE

## 2018-11-05 VITALS
TEMPERATURE: 98 F | SYSTOLIC BLOOD PRESSURE: 147 MMHG | HEIGHT: 62 IN | RESPIRATION RATE: 16 BRPM | BODY MASS INDEX: 38.64 KG/M2 | HEART RATE: 92 BPM | WEIGHT: 210 LBS | OXYGEN SATURATION: 92 % | DIASTOLIC BLOOD PRESSURE: 69 MMHG

## 2018-11-05 DIAGNOSIS — J02.9 SORE THROAT: Primary | ICD-10-CM

## 2018-11-05 DIAGNOSIS — R05.9 COUGH: ICD-10-CM

## 2018-11-05 PROCEDURE — G0463 HOSPITAL OUTPT CLINIC VISIT: HCPCS | Performed by: INTERNAL MEDICINE

## 2018-11-05 PROCEDURE — 99214 OFFICE O/P EST MOD 30 MIN: CPT | Performed by: INTERNAL MEDICINE

## 2018-11-05 RX ORDER — ALPRAZOLAM 0.25 MG/1
TABLET ORAL
Qty: 30 TABLET | Refills: 0 | Status: SHIPPED | OUTPATIENT
Start: 2018-11-05 | End: 2019-02-13

## 2018-11-05 RX ORDER — ATENOLOL 50 MG/1
50 TABLET ORAL
COMMUNITY
End: 2020-05-13

## 2018-11-05 RX ORDER — AMOXICILLIN 500 MG/1
500 TABLET, FILM COATED ORAL 3 TIMES DAILY
Qty: 30 TABLET | Refills: 0 | Status: SHIPPED | OUTPATIENT
Start: 2018-11-05 | End: 2019-02-13 | Stop reason: ALTCHOICE

## 2018-11-05 NOTE — PROGRESS NOTES
HPI:    Patient ID: Dasia Galvan is a 82year old female. Cough   This is a new problem. The current episode started in the past 7 days. The problem has been unchanged. The problem occurs every few hours. The cough is productive of sputum.  Associated aspirin 81 MG Oral Tab Take 1 tablet by mouth daily. Disp:  Rfl:    Calcium Carb-Cholecalciferol (CALCIUM + D3) 600-200 MG-UNIT Oral Tab Take 1 tablet by mouth daily. Disp:  Rfl:    methotrexate (RHEUMATREX) 2.5 MG Oral Tab Take 2.5 mg by mouth.  TAKE 6 T has no wheezes. She has no rales. Abdominal: Soft. Musculoskeletal: She exhibits no edema. Lymphadenopathy:     She has no cervical adenopathy. Skin: She is not diaphoretic.               ASSESSMENT/PLAN:   (J02.9) Sore throat  (primary encounter di

## 2018-11-05 NOTE — TELEPHONE ENCOUNTER
Pt states she was seen in [facility] 10/31/18 for sore throat, states she was informed she did not have strep throat. Pt is not feeling any better at this time. Pt describes pain as a shooting pain up in to her ear.  Also states he son passed away and she she is o

## 2018-11-12 ENCOUNTER — PATIENT OUTREACH (OUTPATIENT)
Dept: CASE MANAGEMENT | Age: 83
End: 2018-11-12

## 2018-11-12 NOTE — PROGRESS NOTES
LVM for pt to please call me back at her earliest convenience so that I might assist her to complete her monthly ccm outreach. P= Await call return.

## 2018-11-26 ENCOUNTER — PATIENT OUTREACH (OUTPATIENT)
Dept: CASE MANAGEMENT | Age: 83
End: 2018-11-26

## 2018-11-26 DIAGNOSIS — H66.90 EAR INFECTION: ICD-10-CM

## 2018-11-26 DIAGNOSIS — J02.9 SORE THROAT: ICD-10-CM

## 2018-11-26 DIAGNOSIS — R05.9 COUGH: ICD-10-CM

## 2018-11-26 DIAGNOSIS — F41.1 GENERALIZED ANXIETY DISORDER: ICD-10-CM

## 2018-11-26 DIAGNOSIS — R09.81 NASAL CONGESTION: ICD-10-CM

## 2018-11-26 NOTE — PROGRESS NOTES
Spoke to [employer], HIPAA verified for CCM call.     Medical History  Patient Active Problem List:     Nocturnal leg cramps     Status post carotid endarterectomy     Angioedema     Mixed hyperlipidemia     Controlled type 2 diabetes mellitus without complicat as this is an annual event. She said she realizes he is in a better place and free from pain. She said it was not easy watching him have the confusion etc, but she said he passed aware in hospice and she was glad for that.   She thanked me for my caring and history, and need for CCM established by May Shipman MD.

## 2018-11-30 PROCEDURE — 99490 CHRNC CARE MGMT STAFF 1ST 20: CPT

## 2018-12-17 ENCOUNTER — PATIENT OUTREACH (OUTPATIENT)
Dept: CASE MANAGEMENT | Age: 83
End: 2018-12-17

## 2018-12-17 DIAGNOSIS — F41.1 GENERALIZED ANXIETY DISORDER: ICD-10-CM

## 2018-12-17 DIAGNOSIS — M06.9 RHEUMATOID ARTHRITIS INVOLVING MULTIPLE SITES, UNSPECIFIED RHEUMATOID FACTOR PRESENCE: ICD-10-CM

## 2018-12-17 NOTE — PROGRESS NOTES
Spoke to [employer], HIPAA verified for CCM call.     Medical History  Patient Active Problem List:     Nocturnal leg cramps     Status post carotid endarterectomy     Angioedema     Mixed hyperlipidemia     Controlled type 2 diabetes mellitus without complicat loud. She said this brings her peace knowing he is there in spirit with her. She said they are working on his old bedroom and they painted and now are looking to make it a plant room.  SHe said she got so many plants for the  that she said she really where needed, and education. Provided acknowledgment and validation to patient's concerns.      Monthly Minute Total including today: 20       Physical assessment, complete health history, and need for CCM established by Elda Barron MD.

## 2018-12-18 ENCOUNTER — TELEPHONE (OUTPATIENT)
Dept: INTERNAL MEDICINE CLINIC | Facility: CLINIC | Age: 83
End: 2018-12-18

## 2018-12-18 NOTE — TELEPHONE ENCOUNTER
Order form for Diabetic Shoes, completed by Dr. Jose Angel Leyva, faxed back and confirmed sent. Original placed for scanning to chart.

## 2018-12-31 PROCEDURE — 99490 CHRNC CARE MGMT STAFF 1ST 20: CPT

## 2019-01-17 ENCOUNTER — PATIENT OUTREACH (OUTPATIENT)
Dept: CASE MANAGEMENT | Age: 84
End: 2019-01-17

## 2019-01-17 NOTE — PROGRESS NOTES
LVM for pt to call me at her earliest convenience so that we might complete her monthly ccm outreach. P= Await call return.

## 2019-01-22 ENCOUNTER — TELEPHONE (OUTPATIENT)
Dept: CASE MANAGEMENT | Age: 84
End: 2019-01-22

## 2019-01-22 ENCOUNTER — PATIENT OUTREACH (OUTPATIENT)
Dept: CASE MANAGEMENT | Age: 84
End: 2019-01-22

## 2019-01-22 DIAGNOSIS — G89.29 CHRONIC LEFT SHOULDER PAIN: ICD-10-CM

## 2019-01-22 DIAGNOSIS — M25.512 CHRONIC LEFT SHOULDER PAIN: ICD-10-CM

## 2019-01-22 DIAGNOSIS — M06.9 RHEUMATOID ARTHRITIS INVOLVING MULTIPLE SITES, UNSPECIFIED RHEUMATOID FACTOR PRESENCE: ICD-10-CM

## 2019-01-22 DIAGNOSIS — E11.9 CONTROLLED TYPE 2 DIABETES MELLITUS WITHOUT COMPLICATION, WITHOUT LONG-TERM CURRENT USE OF INSULIN (HCC): ICD-10-CM

## 2019-01-22 DIAGNOSIS — F41.1 GENERALIZED ANXIETY DISORDER: ICD-10-CM

## 2019-01-22 NOTE — TELEPHONE ENCOUNTER
Pt is still having obvious grief with the loss of her son. Tearful on the phone.  She is willing to see a therapist. Can I have her call Dr. Jose Raul Person as [facility] to her house is not too far and she would apt to be willing to go closer to her home for

## 2019-01-22 NOTE — PROGRESS NOTES
Spoke to [employer], HIPAA verified for CCM call.     Medical History  Patient Active Problem List:     Nocturnal leg cramps     Status post carotid endarterectomy     Angioedema     Mixed hyperlipidemia     Controlled type 2 diabetes mellitus without complicat see what he has to say as \" having this leaking all the time too is not fun either. \"     Generalized Anxiety Disorder/Depression- She is still anxious about her son's passing.  She said she is 82 and he was 61 and this is upsetting that she had to bury he progress and or barriers towards patients goals. Care managers interventions: see above. Time Spent This Encounter Total:25 min medical record review, telephone communication, care plan updates where needed, and education.  Provided acknowledgment a

## 2019-01-31 PROCEDURE — 99490 CHRNC CARE MGMT STAFF 1ST 20: CPT

## 2019-02-13 ENCOUNTER — PATIENT OUTREACH (OUTPATIENT)
Dept: CASE MANAGEMENT | Age: 84
End: 2019-02-13

## 2019-02-13 DIAGNOSIS — F41.1 GENERALIZED ANXIETY DISORDER: ICD-10-CM

## 2019-02-13 DIAGNOSIS — Z01.818 PREOPERATIVE GENERAL PHYSICAL EXAMINATION: ICD-10-CM

## 2019-02-13 DIAGNOSIS — M06.9 RHEUMATOID ARTHRITIS INVOLVING MULTIPLE SITES, UNSPECIFIED RHEUMATOID FACTOR PRESENCE: ICD-10-CM

## 2019-02-13 DIAGNOSIS — M25.512 CHRONIC LEFT SHOULDER PAIN: ICD-10-CM

## 2019-02-13 DIAGNOSIS — G89.29 CHRONIC LEFT SHOULDER PAIN: ICD-10-CM

## 2019-02-13 DIAGNOSIS — E11.9 CONTROLLED TYPE 2 DIABETES MELLITUS WITHOUT COMPLICATION, WITHOUT LONG-TERM CURRENT USE OF INSULIN (HCC): ICD-10-CM

## 2019-02-13 RX ORDER — MELOXICAM 15 MG/1
15 TABLET ORAL DAILY
COMMUNITY
End: 2020-05-13 | Stop reason: ALTCHOICE

## 2019-02-13 RX ORDER — POLYETHYLENE GLYCOL 3350 17 G/17G
17 POWDER, FOR SOLUTION ORAL DAILY
COMMUNITY
End: 2021-03-17 | Stop reason: ALTCHOICE

## 2019-02-13 NOTE — PROGRESS NOTES
Spoke to [employer], HIPAA verified for CCM call.     Medical History  Patient Active Problem List:     Nocturnal leg cramps     Status post carotid endarterectomy     Angioedema     Mixed hyperlipidemia     Controlled type 2 diabetes mellitus without complicat figure out what is wrong. • What steps do you think you could take to work on this? She has an appt with her ortho MD tomorrow. • My goal for next month is: (Patient Stated)  See above.    •         Patient Reported Barriers And Concerns:  She is

## 2019-02-14 RX ORDER — ALPRAZOLAM 0.25 MG/1
TABLET ORAL
Qty: 30 TABLET | Refills: 0 | Status: SHIPPED
Start: 2019-02-14 | End: 2019-03-26

## 2019-02-14 NOTE — TELEPHONE ENCOUNTER
Controlled medication pending for review. If approved needs to be called in or faxed by on-site staff.     Last Rx:11-5-18   LOV: 11-5-18

## 2019-02-15 RX ORDER — ALPRAZOLAM 0.25 MG/1
TABLET ORAL
Qty: 30 TABLET | Refills: 0 | OUTPATIENT
Start: 2019-02-15

## 2019-02-20 ENCOUNTER — PATIENT OUTREACH (OUTPATIENT)
Dept: CASE MANAGEMENT | Age: 84
End: 2019-02-20

## 2019-02-20 ENCOUNTER — TELEPHONE (OUTPATIENT)
Dept: CASE MANAGEMENT | Age: 84
End: 2019-02-20

## 2019-02-20 RX ORDER — AMOXICILLIN 500 MG/1
500 CAPSULE ORAL 3 TIMES DAILY
Qty: 21 CAPSULE | Refills: 0 | OUTPATIENT
Start: 2019-02-20 | End: 2019-02-27

## 2019-02-20 NOTE — TELEPHONE ENCOUNTER
Pt was being followed by [facility] up until the fall of 2018. They informed her that she needs to have this exposed root and tooth pulled from a hospital affliated oral surgeon because they are concerned about possible jaw breakage.  They gav

## 2019-02-20 NOTE — TELEPHONE ENCOUNTER
Spoke with pt. She verified her allergies and she is fine to take the amoxicillin. She said she will try to call [facility] for possible dentists and also [facility] as well. She is aware to call if any changes.  She is aware the medication will be sent

## 2019-02-20 NOTE — TELEPHONE ENCOUNTER
Ok to give her amoxicillin 500mg po TID for 1 week and then she needs to ffup with her dentist. Just clarify with pt that she can take amoxicillin/penicillin and doesn't have any allergy to it (pt's allergy list include cefprozil which is a cephalosporin)

## 2019-02-20 NOTE — PROGRESS NOTES
Pt called saying she has another tooth 'infection\" in her exposed root tooth. She said she has trouble eating/chewing.  She said she was told when she was a pt at [facility] [phone] that they feel she needs to seek the treatment from

## 2019-02-28 PROCEDURE — 99490 CHRNC CARE MGMT STAFF 1ST 20: CPT

## 2019-03-06 ENCOUNTER — PATIENT OUTREACH (OUTPATIENT)
Dept: CASE MANAGEMENT | Age: 84
End: 2019-03-06

## 2019-03-06 DIAGNOSIS — F41.1 GENERALIZED ANXIETY DISORDER: ICD-10-CM

## 2019-03-06 DIAGNOSIS — E11.9 CONTROLLED TYPE 2 DIABETES MELLITUS WITHOUT COMPLICATION, WITHOUT LONG-TERM CURRENT USE OF INSULIN (HCC): ICD-10-CM

## 2019-03-06 DIAGNOSIS — G89.29 CHRONIC LEFT SHOULDER PAIN: ICD-10-CM

## 2019-03-06 DIAGNOSIS — M06.9 RHEUMATOID ARTHRITIS INVOLVING MULTIPLE SITES, UNSPECIFIED RHEUMATOID FACTOR PRESENCE: ICD-10-CM

## 2019-03-06 DIAGNOSIS — M25.512 CHRONIC LEFT SHOULDER PAIN: ICD-10-CM

## 2019-03-06 NOTE — PROGRESS NOTES
Spoke to [employer], HIPAA verified for CCM call.     Medical History  Patient Active Problem List:     Nocturnal leg cramps     Status post carotid endarterectomy     Angioedema     Mixed hyperlipidemia     Controlled type 2 diabetes mellitus without complicat get her tooth taken care of and ideally without possibly breaking her jaw. • Patient reported progress toward goal: She took her ABT's. Is feeling better. She is able to wear her teeth for the first time in a long time.  She will peri

## 2019-03-22 RX ORDER — GLIPIZIDE 5 MG/1
TABLET, FILM COATED, EXTENDED RELEASE ORAL
Qty: 90 TABLET | Refills: 0 | Status: SHIPPED | OUTPATIENT
Start: 2019-03-22 | End: 2019-06-26

## 2019-03-26 ENCOUNTER — OFFICE VISIT (OUTPATIENT)
Dept: INTERNAL MEDICINE CLINIC | Facility: CLINIC | Age: 84
End: 2019-03-26
Payer: MEDICARE

## 2019-03-26 VITALS
BODY MASS INDEX: 37.91 KG/M2 | HEART RATE: 76 BPM | WEIGHT: 206 LBS | DIASTOLIC BLOOD PRESSURE: 74 MMHG | RESPIRATION RATE: 16 BRPM | TEMPERATURE: 98 F | HEIGHT: 62 IN | SYSTOLIC BLOOD PRESSURE: 136 MMHG

## 2019-03-26 DIAGNOSIS — Z00.00 MEDICARE ANNUAL WELLNESS VISIT, SUBSEQUENT: Primary | ICD-10-CM

## 2019-03-26 DIAGNOSIS — Z91.81 AT RISK FOR FALLS: ICD-10-CM

## 2019-03-26 DIAGNOSIS — F41.1 GENERALIZED ANXIETY DISORDER: ICD-10-CM

## 2019-03-26 DIAGNOSIS — I10 ESSENTIAL HYPERTENSION: ICD-10-CM

## 2019-03-26 DIAGNOSIS — M06.9 RHEUMATOID ARTHRITIS INVOLVING MULTIPLE SITES, UNSPECIFIED RHEUMATOID FACTOR PRESENCE: ICD-10-CM

## 2019-03-26 DIAGNOSIS — E78.2 MIXED HYPERLIPIDEMIA: ICD-10-CM

## 2019-03-26 DIAGNOSIS — E11.9 CONTROLLED TYPE 2 DIABETES MELLITUS WITHOUT COMPLICATION, WITHOUT LONG-TERM CURRENT USE OF INSULIN (HCC): ICD-10-CM

## 2019-03-26 PROCEDURE — G0439 PPPS, SUBSEQ VISIT: HCPCS | Performed by: INTERNAL MEDICINE

## 2019-03-26 RX ORDER — TURMERIC ROOT EXTRACT 500 MG
TABLET ORAL DAILY
COMMUNITY
End: 2020-05-13

## 2019-03-26 RX ORDER — ALPRAZOLAM 0.25 MG/1
TABLET ORAL
Qty: 30 TABLET | Refills: 0 | Status: SHIPPED | OUTPATIENT
Start: 2019-03-26 | End: 2019-06-12

## 2019-03-26 RX ORDER — OMEPRAZOLE 20 MG/1
20 CAPSULE, DELAYED RELEASE ORAL
COMMUNITY
End: 2020-05-13

## 2019-03-27 PROBLEM — J18.9 PNEUMONIA, COMMUNITY ACQUIRED: Status: RESOLVED | Noted: 2017-04-13 | Resolved: 2019-03-27

## 2019-03-27 PROBLEM — B37.0 ORAL THRUSH: Status: RESOLVED | Noted: 2017-05-23 | Resolved: 2019-03-27

## 2019-03-27 PROBLEM — R09.81 NASAL CONGESTION: Status: RESOLVED | Noted: 2018-07-02 | Resolved: 2019-03-27

## 2019-03-27 PROBLEM — R05.9 COUGH: Status: RESOLVED | Noted: 2017-01-24 | Resolved: 2019-03-27

## 2019-03-27 PROBLEM — M25.512 CHRONIC LEFT SHOULDER PAIN: Status: RESOLVED | Noted: 2018-07-02 | Resolved: 2019-03-27

## 2019-03-27 PROBLEM — G89.29 CHRONIC LEFT SHOULDER PAIN: Status: RESOLVED | Noted: 2018-07-02 | Resolved: 2019-03-27

## 2019-03-27 PROBLEM — Z91.81 AT RISK FOR FALLS: Status: ACTIVE | Noted: 2019-03-27

## 2019-03-27 PROBLEM — J02.9 SORE THROAT: Status: RESOLVED | Noted: 2018-11-05 | Resolved: 2019-03-27

## 2019-03-31 PROCEDURE — 99490 CHRNC CARE MGMT STAFF 1ST 20: CPT

## 2019-04-10 ENCOUNTER — PATIENT OUTREACH (OUTPATIENT)
Dept: CASE MANAGEMENT | Age: 84
End: 2019-04-10

## 2019-04-10 DIAGNOSIS — F41.1 GENERALIZED ANXIETY DISORDER: ICD-10-CM

## 2019-04-10 DIAGNOSIS — Z91.81 AT RISK FOR FALLS: ICD-10-CM

## 2019-04-10 DIAGNOSIS — M06.9 RHEUMATOID ARTHRITIS INVOLVING MULTIPLE SITES, UNSPECIFIED RHEUMATOID FACTOR PRESENCE: ICD-10-CM

## 2019-04-10 NOTE — PROGRESS NOTES
Spoke to [employer], HIPAA verified for CCM call.     Medical History  Patient Active Problem List:     Mixed hyperlipidemia     Controlled type 2 diabetes mellitus without complication, without long-term current use of insulin (HCC)     Generalized anxiety dis She  Fixed his room and filled it with plants. He passed away 5 months ago with liver failure. He was 62 yrs. Previous goal met: yes    New Goal (if previous met)  • What would you say is your biggest concerns about your health?  Right now is to get [facility]

## 2019-04-30 PROCEDURE — 99490 CHRNC CARE MGMT STAFF 1ST 20: CPT

## 2019-05-29 ENCOUNTER — PATIENT OUTREACH (OUTPATIENT)
Dept: CASE MANAGEMENT | Age: 84
End: 2019-05-29

## 2019-05-29 DIAGNOSIS — F41.1 GENERALIZED ANXIETY DISORDER: ICD-10-CM

## 2019-05-29 DIAGNOSIS — M06.9 RHEUMATOID ARTHRITIS INVOLVING MULTIPLE SITES, UNSPECIFIED RHEUMATOID FACTOR PRESENCE: ICD-10-CM

## 2019-05-29 DIAGNOSIS — E78.2 MIXED HYPERLIPIDEMIA: ICD-10-CM

## 2019-05-29 DIAGNOSIS — E11.9 CONTROLLED TYPE 2 DIABETES MELLITUS WITHOUT COMPLICATION, WITHOUT LONG-TERM CURRENT USE OF INSULIN (HCC): ICD-10-CM

## 2019-05-29 DIAGNOSIS — Z91.81 AT RISK FOR FALLS: ICD-10-CM

## 2019-05-29 DIAGNOSIS — I10 ESSENTIAL HYPERTENSION: ICD-10-CM

## 2019-05-29 NOTE — PROGRESS NOTES
5/29/2019  Spoke to [employer] for CCM. Introduced myself as new ccm  Verified hippa  Patient requesting I mail out my contact information.        Updates to patient care team/ comments: UTD  Patient reported changes in medications: pt reports no changes in doing it again. She states she really loved the physical therapy because they would massage her muscles and it would feel good. New Goal (if previous met)  • My goal for next month is:    My shoulder pain is my biggest concern right now.

## 2019-05-31 PROCEDURE — 99490 CHRNC CARE MGMT STAFF 1ST 20: CPT

## 2019-06-12 RX ORDER — ALPRAZOLAM 0.25 MG/1
TABLET ORAL
Qty: 30 TABLET | Refills: 0 | Status: SHIPPED
Start: 2019-06-12 | End: 2019-08-04

## 2019-06-14 ENCOUNTER — PATIENT OUTREACH (OUTPATIENT)
Dept: CASE MANAGEMENT | Age: 84
End: 2019-06-14

## 2019-06-14 NOTE — PROGRESS NOTES
Called patient for CCM monthly  Left detailed message to call back  Verified HIPPA  I will follow up with patient at a later time  Reviewed chart    Time spent this encounter: 3 mins   Total time spent this month: 3 mins

## 2019-06-27 RX ORDER — GLIPIZIDE 5 MG/1
TABLET, FILM COATED, EXTENDED RELEASE ORAL
Qty: 90 TABLET | Refills: 0 | Status: SHIPPED | OUTPATIENT
Start: 2019-06-27 | End: 2019-09-27

## 2019-06-27 NOTE — TELEPHONE ENCOUNTER
Please review; protocol failed. Pt has not f/u with completing hemoglobin A1C ordered 3/26/19. Nurses please contact pt to complete labs.     Routed to MD for review

## 2019-07-02 ENCOUNTER — PATIENT OUTREACH (OUTPATIENT)
Dept: CASE MANAGEMENT | Age: 84
End: 2019-07-02

## 2019-07-02 NOTE — PROGRESS NOTES
Called patient for CCM monthly  Left detailed message to call back  Verified HIPPA  I will follow up with patient at a later time  Reviewed chart    Time spent this encounter: 4 mins   Total time spent this month: 4 mins

## 2019-07-18 ENCOUNTER — PATIENT OUTREACH (OUTPATIENT)
Dept: CASE MANAGEMENT | Age: 84
End: 2019-07-18

## 2019-07-18 DIAGNOSIS — Z00.00 MEDICARE ANNUAL WELLNESS VISIT, SUBSEQUENT: ICD-10-CM

## 2019-07-18 DIAGNOSIS — M06.9 RHEUMATOID ARTHRITIS INVOLVING MULTIPLE SITES, UNSPECIFIED RHEUMATOID FACTOR PRESENCE: ICD-10-CM

## 2019-07-18 DIAGNOSIS — Z91.81 AT RISK FOR FALLS: ICD-10-CM

## 2019-07-18 DIAGNOSIS — F41.1 GENERALIZED ANXIETY DISORDER: ICD-10-CM

## 2019-07-18 DIAGNOSIS — E78.2 MIXED HYPERLIPIDEMIA: ICD-10-CM

## 2019-07-18 DIAGNOSIS — E11.9 CONTROLLED TYPE 2 DIABETES MELLITUS WITHOUT COMPLICATION, WITHOUT LONG-TERM CURRENT USE OF INSULIN (HCC): ICD-10-CM

## 2019-07-18 DIAGNOSIS — I10 ESSENTIAL HYPERTENSION: ICD-10-CM

## 2019-07-18 NOTE — PROGRESS NOTES
7/18/2019  Spoke to [employer] for CCM.    Verified HIPPA    Patient Active Problem List:     Mixed hyperlipidemia     Controlled type 2 diabetes mellitus without complication, without long-term current use of insulin (HCC)     Generalized anxiety disorder this soon. She is going out today to buy fruit. Encouraged summer safety tips         No future appointments.     • Active goal from previous outreach:                       Managing my shoulder pain     • Patient reported progress toward goal: ayazin

## 2019-07-19 ENCOUNTER — LAB ENCOUNTER (OUTPATIENT)
Dept: LAB | Age: 84
End: 2019-07-19
Attending: INTERNAL MEDICINE
Payer: MEDICARE

## 2019-07-19 DIAGNOSIS — E11.9 CONTROLLED TYPE 2 DIABETES MELLITUS WITHOUT COMPLICATION, WITHOUT LONG-TERM CURRENT USE OF INSULIN (HCC): ICD-10-CM

## 2019-07-19 DIAGNOSIS — E78.2 MIXED HYPERLIPIDEMIA: ICD-10-CM

## 2019-07-19 LAB
ALBUMIN SERPL-MCNC: 3.5 G/DL (ref 3.4–5)
ALBUMIN/GLOB SERPL: 1.1 {RATIO} (ref 1–2)
ALP LIVER SERPL-CCNC: 83 U/L (ref 55–142)
ALT SERPL-CCNC: 25 U/L (ref 13–56)
ANION GAP SERPL CALC-SCNC: 9 MMOL/L (ref 0–18)
AST SERPL-CCNC: 24 U/L (ref 15–37)
BILIRUB SERPL-MCNC: 0.8 MG/DL (ref 0.1–2)
BUN BLD-MCNC: 15 MG/DL (ref 7–18)
BUN/CREAT SERPL: 18.3 (ref 10–20)
CALCIUM BLD-MCNC: 9.1 MG/DL (ref 8.5–10.1)
CHLORIDE SERPL-SCNC: 111 MMOL/L (ref 98–112)
CHOLEST SMN-MCNC: 190 MG/DL (ref ?–200)
CO2 SERPL-SCNC: 26 MMOL/L (ref 21–32)
CREAT BLD-MCNC: 0.82 MG/DL (ref 0.55–1.02)
EST. AVERAGE GLUCOSE BLD GHB EST-MCNC: 117 MG/DL (ref 68–126)
GLOBULIN PLAS-MCNC: 3.2 G/DL (ref 2.8–4.4)
GLUCOSE BLD-MCNC: 88 MG/DL (ref 70–99)
HBA1C MFR BLD HPLC: 5.7 % (ref ?–5.7)
HDLC SERPL-MCNC: 54 MG/DL (ref 40–59)
LDLC SERPL CALC-MCNC: 112 MG/DL (ref ?–100)
M PROTEIN MFR SERPL ELPH: 6.7 G/DL (ref 6.4–8.2)
NONHDLC SERPL-MCNC: 136 MG/DL (ref ?–130)
OSMOLALITY SERPL CALC.SUM OF ELEC: 302 MOSM/KG (ref 275–295)
PATIENT FASTING: YES
PATIENT FASTING: YES
POTASSIUM SERPL-SCNC: 4.2 MMOL/L (ref 3.5–5.1)
SODIUM SERPL-SCNC: 146 MMOL/L (ref 136–145)
TRIGL SERPL-MCNC: 118 MG/DL (ref 30–149)
VLDLC SERPL CALC-MCNC: 24 MG/DL (ref 0–30)

## 2019-07-19 PROCEDURE — 80061 LIPID PANEL: CPT

## 2019-07-19 PROCEDURE — 80053 COMPREHEN METABOLIC PANEL: CPT

## 2019-07-19 PROCEDURE — 83036 HEMOGLOBIN GLYCOSYLATED A1C: CPT

## 2019-07-19 PROCEDURE — 36415 COLL VENOUS BLD VENIPUNCTURE: CPT

## 2019-07-31 PROCEDURE — 99490 CHRNC CARE MGMT STAFF 1ST 20: CPT

## 2019-08-05 RX ORDER — ALPRAZOLAM 0.25 MG/1
TABLET ORAL
Qty: 30 TABLET | Refills: 0 | Status: SHIPPED
Start: 2019-08-05 | End: 2019-09-19

## 2019-08-05 NOTE — TELEPHONE ENCOUNTER
Medication pended for review. If approved needs to be called in or faxed by on-site staff.      Last RX: 6/12/19    Requested Prescriptions     Pending Prescriptions Disp Refills   • ALPRAZOLAM 0.25 MG Oral Tab [Pharmacy Med Name: ALPRAZOLAM 0.25 MG TABLET]

## 2019-08-05 NOTE — TELEPHONE ENCOUNTER
Per printed script from DR. Chavez, refill called to [facility] for Alprazolam 0.25 mg,qty # 30. Take one tab by mouth daily as needed for anxiety. No additional refills.

## 2019-08-21 ENCOUNTER — PATIENT OUTREACH (OUTPATIENT)
Dept: CASE MANAGEMENT | Age: 84
End: 2019-08-21

## 2019-08-21 DIAGNOSIS — F41.1 GENERALIZED ANXIETY DISORDER: ICD-10-CM

## 2019-08-21 DIAGNOSIS — I10 ESSENTIAL HYPERTENSION: ICD-10-CM

## 2019-08-21 DIAGNOSIS — Z00.00 MEDICARE ANNUAL WELLNESS VISIT, SUBSEQUENT: ICD-10-CM

## 2019-08-21 DIAGNOSIS — E11.9 CONTROLLED TYPE 2 DIABETES MELLITUS WITHOUT COMPLICATION, WITHOUT LONG-TERM CURRENT USE OF INSULIN (HCC): ICD-10-CM

## 2019-08-21 DIAGNOSIS — E78.2 MIXED HYPERLIPIDEMIA: ICD-10-CM

## 2019-08-21 DIAGNOSIS — Z91.81 AT RISK FOR FALLS: ICD-10-CM

## 2019-08-21 DIAGNOSIS — M06.9 RHEUMATOID ARTHRITIS INVOLVING MULTIPLE SITES, UNSPECIFIED RHEUMATOID FACTOR PRESENCE: ICD-10-CM

## 2019-08-21 RX ORDER — AMLODIPINE BESYLATE 5 MG/1
1 TABLET ORAL 2 TIMES DAILY
COMMUNITY
Start: 2019-06-27 | End: 2021-01-29

## 2019-08-21 NOTE — PROGRESS NOTES
8/21/2019  Spoke to [employer] for CCM.     Verified HIPPA    Patient Active Problem List:     Mixed hyperlipidemia     Controlled type 2 diabetes mellitus without complication, without long-term current use of insulin (HCC)     Generalized anxiety disorder adding oat bran, fruit, beans, lentils and vegetables to your diet.     Cholesterol:     Lab Results   Component Value Date    CHOLEST 190 07/19/2019     Lab Results   Component Value Date    HDL 54 07/19/2019     Lab Results   Component Value Date    TRIG

## 2019-08-31 PROCEDURE — 99490 CHRNC CARE MGMT STAFF 1ST 20: CPT

## 2019-09-09 ENCOUNTER — PATIENT OUTREACH (OUTPATIENT)
Dept: CASE MANAGEMENT | Age: 84
End: 2019-09-09

## 2019-09-09 DIAGNOSIS — I10 ESSENTIAL HYPERTENSION: ICD-10-CM

## 2019-09-09 DIAGNOSIS — E78.2 MIXED HYPERLIPIDEMIA: ICD-10-CM

## 2019-09-09 DIAGNOSIS — F41.1 GENERALIZED ANXIETY DISORDER: ICD-10-CM

## 2019-09-09 DIAGNOSIS — Z00.00 MEDICARE ANNUAL WELLNESS VISIT, SUBSEQUENT: ICD-10-CM

## 2019-09-09 DIAGNOSIS — E11.9 CONTROLLED TYPE 2 DIABETES MELLITUS WITHOUT COMPLICATION, WITHOUT LONG-TERM CURRENT USE OF INSULIN (HCC): ICD-10-CM

## 2019-09-09 DIAGNOSIS — Z91.81 AT RISK FOR FALLS: ICD-10-CM

## 2019-09-09 DIAGNOSIS — M06.9 RHEUMATOID ARTHRITIS INVOLVING MULTIPLE SITES, UNSPECIFIED RHEUMATOID FACTOR PRESENCE: ICD-10-CM

## 2019-09-09 NOTE — PROGRESS NOTES
9/9/2019  Spoke to [employer] for CCM.     Verified HIPPA    Patient Active Problem List:     Mixed hyperlipidemia     Controlled type 2 diabetes mellitus without complication, without long-term current use of insulin (HCC)     Generalized anxiety disorder • Active goal from previous outreach:                       Manage my left shoulder pain before my trip to [facility]     • Patient reported progress toward goal: progressing       • Update to previous barriers:  Dr. Joanne Jerez office had contacted her and

## 2019-09-20 NOTE — TELEPHONE ENCOUNTER
Controlled medications pending for review. If approved needs to be called in or faxed by on site staff.     Last Rx: 8-5-19 # 30  LOV: 3-26-19    Requested Prescriptions     Pending Prescriptions Disp Refills   • ALPRAZOLAM 0.25 MG Oral Tab [Pharmacy Med Na

## 2019-09-21 RX ORDER — ALPRAZOLAM 0.25 MG/1
TABLET ORAL
Qty: 30 TABLET | Refills: 1 | Status: SHIPPED
Start: 2019-09-21 | End: 2019-12-21

## 2019-09-21 NOTE — TELEPHONE ENCOUNTER
Called [facility] pharmacy and left rx approval on voicemail as authorized by Dr. Bruna Meza for Alprazolam 0.25 # 30 with 1RF

## 2019-09-28 NOTE — TELEPHONE ENCOUNTER
Review pended refill request as it does not fall under a protocol.   Requested Prescriptions     Pending Prescriptions Disp Refills   • EZETIMIBE 10 MG Oral Tab [Pharmacy Med Name: EZETIMIBE  10MG  TAB] 90 tablet 0     Sig: TAKE 1 TABLET BY MOUTH  NIGHTLY

## 2019-09-30 PROCEDURE — 99490 CHRNC CARE MGMT STAFF 1ST 20: CPT

## 2019-10-01 RX ORDER — GLIPIZIDE 5 MG/1
TABLET, FILM COATED, EXTENDED RELEASE ORAL
Qty: 90 TABLET | Refills: 1 | Status: SHIPPED | OUTPATIENT
Start: 2019-10-01 | End: 2020-03-08

## 2019-10-01 RX ORDER — EZETIMIBE 10 MG/1
TABLET ORAL
Qty: 90 TABLET | Refills: 1 | Status: SHIPPED | OUTPATIENT
Start: 2019-10-01 | End: 2020-03-06

## 2019-10-23 ENCOUNTER — PATIENT OUTREACH (OUTPATIENT)
Dept: CASE MANAGEMENT | Age: 84
End: 2019-10-23

## 2019-10-23 NOTE — PROGRESS NOTES
Contacted patient introduced my self as her new CCM. Pt verbalized understanding. Relates this being a very busy morning. States she is due for labs and plans to have them drawn soon. Pt is having transportation problems.  Daughter walked into room and advi

## 2019-10-24 ENCOUNTER — PATIENT OUTREACH (OUTPATIENT)
Dept: CASE MANAGEMENT | Age: 84
End: 2019-10-24

## 2019-10-24 DIAGNOSIS — F41.1 GENERALIZED ANXIETY DISORDER: ICD-10-CM

## 2019-10-24 DIAGNOSIS — M06.9 RHEUMATOID ARTHRITIS INVOLVING MULTIPLE SITES, UNSPECIFIED RHEUMATOID FACTOR PRESENCE: ICD-10-CM

## 2019-10-24 NOTE — PROGRESS NOTES
10/24/2019  Spoke to [facility] for CCM. Updates to patient care team/ comments: None  Patient reported changes in medications: None  Med Adherence  Comment: Taking as directed    Health Maintenance: Reviewed with patient  . DEXA Scan due on 07/16/2002 Dec very anxious and scared about surgery. - Plan for overcoming all barriers: Out way pros and cons. Finalize a decision. Patient agrees to goal action plan.  yes  Self-Management Abilities (patient reported)             1= least c

## 2019-10-31 PROCEDURE — 99490 CHRNC CARE MGMT STAFF 1ST 20: CPT

## 2019-11-18 ENCOUNTER — PATIENT OUTREACH (OUTPATIENT)
Dept: CASE MANAGEMENT | Age: 84
End: 2019-11-18

## 2019-11-18 ENCOUNTER — OFFICE VISIT (OUTPATIENT)
Dept: INTERNAL MEDICINE CLINIC | Facility: CLINIC | Age: 84
End: 2019-11-18
Payer: MEDICARE

## 2019-11-18 VITALS
OXYGEN SATURATION: 96 % | TEMPERATURE: 99 F | HEART RATE: 92 BPM | BODY MASS INDEX: 38 KG/M2 | DIASTOLIC BLOOD PRESSURE: 70 MMHG | RESPIRATION RATE: 16 BRPM | HEIGHT: 62 IN | SYSTOLIC BLOOD PRESSURE: 140 MMHG

## 2019-11-18 DIAGNOSIS — R05.9 COUGH: Primary | ICD-10-CM

## 2019-11-18 PROCEDURE — G0463 HOSPITAL OUTPT CLINIC VISIT: HCPCS | Performed by: INTERNAL MEDICINE

## 2019-11-18 PROCEDURE — 99214 OFFICE O/P EST MOD 30 MIN: CPT | Performed by: INTERNAL MEDICINE

## 2019-11-18 RX ORDER — DOXYCYCLINE 100 MG/1
100 TABLET ORAL 2 TIMES DAILY
Qty: 14 TABLET | Refills: 0 | Status: SHIPPED | OUTPATIENT
Start: 2019-11-18 | End: 2020-04-21 | Stop reason: ALTCHOICE

## 2019-11-22 ENCOUNTER — PATIENT OUTREACH (OUTPATIENT)
Dept: CASE MANAGEMENT | Age: 84
End: 2019-11-22

## 2019-11-22 DIAGNOSIS — E11.9 CONTROLLED TYPE 2 DIABETES MELLITUS WITHOUT COMPLICATION, WITHOUT LONG-TERM CURRENT USE OF INSULIN (HCC): ICD-10-CM

## 2019-11-22 DIAGNOSIS — Z91.81 AT RISK FOR FALLS: ICD-10-CM

## 2019-11-22 DIAGNOSIS — I10 ESSENTIAL HYPERTENSION: ICD-10-CM

## 2019-11-22 DIAGNOSIS — F41.1 GENERALIZED ANXIETY DISORDER: ICD-10-CM

## 2019-11-22 NOTE — PROGRESS NOTES
11/22/2019  Spoke to [facility] for CCM. Updates to patient care team/ comments: UTD  Patient reported changes in medications: None  Med Adherence  Comment: Taking as directed    Health Maintenance: Reviewed with patient.   DEXA Scan due on 07/16/2002  Kate yes  Self-Management Abilities (patient reported)             1= least confident in achieving goal, 5= very confident               - confidence: 5    Care Manager Follow Up:   One month    Reason For Follow Up: review progress and or barriers towards patie

## 2019-11-23 NOTE — TELEPHONE ENCOUNTER
Review pended refill request as it does not fall under a protocol.     Last Rx: 9/18/18  LOV: 5 days ago

## 2019-11-24 RX ORDER — GABAPENTIN 300 MG/1
CAPSULE ORAL
Qty: 270 CAPSULE | Refills: 1 | Status: SHIPPED | OUTPATIENT
Start: 2019-11-24 | End: 2020-06-14

## 2019-11-28 NOTE — TELEPHONE ENCOUNTER
Called pt and lab results given. Pt understood and will stop Glipizide and call office in 3 months to request lab order.
Dr. Gilma Ellison did you get the results.
Her a1c was 5.6 so this level is normal and not diabetic. I would hold her glipizide since she may be at risk for hypoglycemia. Would recheck her a1c again in 3mos.
I had not received her lab results; pls call [facility] to get results
Pt states she had labs done 7/2 at [facility] and has not received results    Questioning if Dr Melania Block received results?
Request sent to [facility] via fax as urgent. Will await results.
Results placed on your desk for review.
28-Nov-2019 06:03:32

## 2019-11-30 PROCEDURE — 99490 CHRNC CARE MGMT STAFF 1ST 20: CPT

## 2019-12-16 ENCOUNTER — PATIENT OUTREACH (OUTPATIENT)
Dept: CASE MANAGEMENT | Age: 84
End: 2019-12-16

## 2019-12-16 NOTE — PROGRESS NOTES
Patient called states she has some intermittent diarrhea and gas. Stated her stool comes out like an explosions. Pt called requesting an appointment for tomorrow with Dr. Preethi Nguyen. declined appointment for today.  Denies having any fever, states she is

## 2019-12-17 ENCOUNTER — OFFICE VISIT (OUTPATIENT)
Dept: INTERNAL MEDICINE CLINIC | Facility: CLINIC | Age: 84
End: 2019-12-17
Payer: MEDICARE

## 2019-12-17 VITALS
SYSTOLIC BLOOD PRESSURE: 130 MMHG | RESPIRATION RATE: 12 BRPM | DIASTOLIC BLOOD PRESSURE: 80 MMHG | HEIGHT: 61 IN | WEIGHT: 207 LBS | BODY MASS INDEX: 39.08 KG/M2 | HEART RATE: 83 BPM | TEMPERATURE: 98 F

## 2019-12-17 DIAGNOSIS — R19.7 DIARRHEA OF PRESUMED INFECTIOUS ORIGIN: Primary | ICD-10-CM

## 2019-12-17 PROCEDURE — 99214 OFFICE O/P EST MOD 30 MIN: CPT | Performed by: INTERNAL MEDICINE

## 2019-12-17 PROCEDURE — G0463 HOSPITAL OUTPT CLINIC VISIT: HCPCS | Performed by: INTERNAL MEDICINE

## 2019-12-17 NOTE — PROGRESS NOTES
HPI:    Patient ID: Natali Mott is a 82year old female. Diarrhea    This is a new problem. The current episode started in the past 7 days (5 days). The problem occurs 5 to 10 times per day. The problem has been gradually improving.  The stool consis mouth.     • LOSARTAN POTASSIUM 50 MG Oral Tab TAKE 1 TABLET BY MOUTH EVERY DAY 90 tablet 0   • AmLODIPine Besylate 10 MG Oral Tab Take 5 mg by mouth 2 (two) times daily.      • Multiple Vitamins-Minerals (EYE VITAMINS) Oral Cap Take 1 capsule by mouth braulio Normocephalic and atraumatic. Right Ear: External ear normal.   Left Ear: External ear normal.   Nose: Nose normal.   Mouth/Throat: Oropharynx is clear and moist. No oropharyngeal exudate. Eyes: Pupils are equal, round, and reactive to light.  Conjuncti

## 2019-12-24 RX ORDER — ALPRAZOLAM 0.25 MG/1
TABLET ORAL
Qty: 30 TABLET | Refills: 0 | Status: SHIPPED | OUTPATIENT
Start: 2019-12-24 | End: 2020-02-17

## 2019-12-26 ENCOUNTER — TELEPHONE (OUTPATIENT)
Dept: INTERNAL MEDICINE CLINIC | Facility: CLINIC | Age: 84
End: 2019-12-26

## 2019-12-26 NOTE — TELEPHONE ENCOUNTER
Got paged by [facility] ; pt came in with coughing for 4 days,. Low grade fever;  Flu test negative. cxr some haziness lung base but is chronic so pneumonia less likely. Pt will be discharged home on oral abx and ffup in clinic.

## 2019-12-27 ENCOUNTER — NURSE TRIAGE (OUTPATIENT)
Dept: INTERNAL MEDICINE CLINIC | Facility: CLINIC | Age: 84
End: 2019-12-27

## 2019-12-27 ENCOUNTER — PATIENT OUTREACH (OUTPATIENT)
Dept: CASE MANAGEMENT | Age: 84
End: 2019-12-27
Payer: MEDICARE

## 2019-12-27 ENCOUNTER — TELEPHONE (OUTPATIENT)
Dept: CASE MANAGEMENT | Age: 84
End: 2019-12-27

## 2019-12-27 DIAGNOSIS — I25.10 CORONARY ARTERIOSCLEROSIS IN NATIVE ARTERY: ICD-10-CM

## 2019-12-27 DIAGNOSIS — M06.9 RHEUMATOID ARTHRITIS INVOLVING MULTIPLE SITES, UNSPECIFIED RHEUMATOID FACTOR PRESENCE: ICD-10-CM

## 2019-12-27 DIAGNOSIS — I10 ESSENTIAL HYPERTENSION: ICD-10-CM

## 2019-12-27 DIAGNOSIS — Z91.81 AT RISK FOR FALLS: ICD-10-CM

## 2019-12-27 PROBLEM — I35.8 AORTIC VALVE SCLEROSIS: Status: ACTIVE | Noted: 2017-07-24

## 2019-12-27 NOTE — TELEPHONE ENCOUNTER
Patient informed of provider's response/recommendations below and states will contact her orthopedic doctor or also her \"arthritis doctor. \" Advised to call back if cannot get help from them so pt can be scheduled with one of Dr Adonay Bailey partners and

## 2019-12-27 NOTE — TELEPHONE ENCOUNTER
Pt was seen at [facility] brothers yesterday diagnosed with Pneumonia started on Zpack and Cefdinir. Pt states she is feeling okay and wanted to tell Dr. Alisa Friedman.    Right arm pain reports it being severe, had x-ray done and was advised she has arthritis.

## 2019-12-27 NOTE — PROGRESS NOTES
12/27/2019  Spoke to [employer] for CCM.       Updates to patient care team/ comments: UTD  Patient reported changes in medications: Pt was started on Z-pack and Cefdinir at [facility] ED    Med Adherence: Taking as directed    Health Maintenance: Reviewe not improve. Time Spent This Encounter Total: 27 min medical record review, telephone communication, care plan updates where needed, education, goals and action plan recreation/update. Provided acknowledgment and validation to patient's concerns.    [facility]

## 2019-12-27 NOTE — TELEPHONE ENCOUNTER
I will be out of town starting tomorrow for a week. Pt will need to be seen by another provider in our clinic or call her ortho DR He instead.

## 2019-12-27 NOTE — TELEPHONE ENCOUNTER
Action Requested: Summary for Provider     []  Critical Lab, Recommendations Needed  [x] Need Additional Advice  []   FYI    []   Need Orders  [] Need Medications Sent to Pharmacy  []  Other     SUMMARY: Patient calling with complaint of right arm/shoulder

## 2019-12-31 PROCEDURE — 99490 CHRNC CARE MGMT STAFF 1ST 20: CPT

## 2020-01-09 ENCOUNTER — PATIENT OUTREACH (OUTPATIENT)
Dept: CASE MANAGEMENT | Age: 85
End: 2020-01-09
Payer: MEDICARE

## 2020-01-09 DIAGNOSIS — E11.9 CONTROLLED TYPE 2 DIABETES MELLITUS WITHOUT COMPLICATION, WITHOUT LONG-TERM CURRENT USE OF INSULIN (HCC): ICD-10-CM

## 2020-01-09 DIAGNOSIS — I10 ESSENTIAL HYPERTENSION: ICD-10-CM

## 2020-01-09 DIAGNOSIS — M06.9 RHEUMATOID ARTHRITIS INVOLVING MULTIPLE SITES, UNSPECIFIED RHEUMATOID FACTOR PRESENCE: ICD-10-CM

## 2020-01-09 NOTE — PROGRESS NOTES
1/9/2020  Spoke to [facility] for CCM. Updates to patient care team/ comments: UTD  Patient reported changes in medications: None  Med Adherence  Comment: Taking as directed    Health Maintenance: Reviewed with patient.   DEXA Scan due on 07/16/2002  Jose Total: 23 min medical record review, telephone communication, care plan updates where needed, education, goals and action plan recreation/update. Provided acknowledgment and validation to patient's concerns.    Monthly Minute Total including today: 23    Ph

## 2020-01-10 ENCOUNTER — OFFICE VISIT (OUTPATIENT)
Dept: INTERNAL MEDICINE CLINIC | Facility: CLINIC | Age: 85
End: 2020-01-10
Payer: MEDICARE

## 2020-01-10 VITALS
HEIGHT: 61 IN | BODY MASS INDEX: 39.65 KG/M2 | HEART RATE: 69 BPM | DIASTOLIC BLOOD PRESSURE: 68 MMHG | WEIGHT: 210 LBS | TEMPERATURE: 97 F | SYSTOLIC BLOOD PRESSURE: 120 MMHG

## 2020-01-10 DIAGNOSIS — M06.9 RHEUMATOID ARTHRITIS INVOLVING MULTIPLE SITES, UNSPECIFIED RHEUMATOID FACTOR PRESENCE: Primary | ICD-10-CM

## 2020-01-10 DIAGNOSIS — R06.2 WHEEZE: ICD-10-CM

## 2020-01-10 DIAGNOSIS — J18.9 PNEUMONIA DUE TO INFECTIOUS ORGANISM, UNSPECIFIED LATERALITY, UNSPECIFIED PART OF LUNG: ICD-10-CM

## 2020-01-10 PROCEDURE — G0463 HOSPITAL OUTPT CLINIC VISIT: HCPCS | Performed by: INTERNAL MEDICINE

## 2020-01-10 PROCEDURE — 99214 OFFICE O/P EST MOD 30 MIN: CPT | Performed by: INTERNAL MEDICINE

## 2020-01-10 RX ORDER — ALBUTEROL SULFATE 90 UG/1
2 AEROSOL, METERED RESPIRATORY (INHALATION) EVERY 4 HOURS PRN
Qty: 1 INHALER | Refills: 6 | Status: SHIPPED | OUTPATIENT
Start: 2020-01-10 | End: 2021-10-13 | Stop reason: ALTCHOICE

## 2020-01-10 RX ORDER — HYDROCODONE BITARTRATE AND ACETAMINOPHEN 5; 325 MG/1; MG/1
1 TABLET ORAL DAILY PRN
Qty: 20 TABLET | Refills: 0 | Status: SHIPPED | OUTPATIENT
Start: 2020-01-10 | End: 2020-05-13 | Stop reason: ALTCHOICE

## 2020-01-10 RX ORDER — METHYLPREDNISOLONE 4 MG/1
TABLET ORAL
Qty: 1 PACKAGE | Refills: 0 | Status: SHIPPED | OUTPATIENT
Start: 2020-01-10 | End: 2020-04-21 | Stop reason: ALTCHOICE

## 2020-01-10 NOTE — PROGRESS NOTES
History of Present Illness   Patient ID: Jaylin Stevens is a 82year old female. Chief Complaint: Arthritis (Woke up with a flare of her arthritis, )      Joint Pain   This is a chronic problem. The current episode started in the past 7 days.  The problem oriented to person, place, and time. She appears well-developed and well-nourished. HENT:   Head: Normocephalic and atraumatic.    Right Ear: Tympanic membrane and ear canal normal.   Left Ear: Tympanic membrane and ear canal normal.   Mouth/Throat: No po allergies:    Sulfa Antibiotics       SWELLING  Atorvastatin            MYALGIA  Irbesartan              MYALGIA  Rosuvastatin            MYALGIA  Amoxicillin             OTHER (SEE COMMENTS)    Comment:Yeast infection  Bactrim                 ITCHING  Cla • ANGIOPLASTY (CORONARY)      Angioplasty with Stent; as per NG   • COLONOSCOPY     • ELECTROCARDIOGRAM, COMPLETE  03-    SCANNED TO MEDIA TAB- 03-      Reviewed:  Social History    Tobacco Use      Smoking status: Former Smoker      Smokel Cranberry 125 MG Oral Tab Take 1 tablet by mouth daily. • Nystatin 131034 UNIT/GM External Powder Apply 1 Application topically 2 (two) times daily. 100 g 0   • Metoprolol Succinate ER 50 MG Oral Tablet 24 Hr 50 mg daily.        • folic acid 1 MG Oral T Inhalation Aero Soln; Inhale 2 puffs into the lungs every 4 (four) hours as needed for Wheezing. Dispense: 1 Inhaler; Refill: 6  - methylPREDNISolone 4 MG Oral Tablet Therapy Pack; Take as directed. Dispense: 1 Package;  Refill: 0  Plan  Likely caused by

## 2020-01-31 PROCEDURE — 99490 CHRNC CARE MGMT STAFF 1ST 20: CPT

## 2020-02-14 ENCOUNTER — PATIENT OUTREACH (OUTPATIENT)
Dept: CASE MANAGEMENT | Age: 85
End: 2020-02-14

## 2020-02-14 NOTE — PROGRESS NOTES
Contacting patient to follow up on CCM for the month.  [facility]      Time with pt -  min  Chart review - 2 min  Total time -2  min

## 2020-02-17 NOTE — TELEPHONE ENCOUNTER
Review pended refill request as it does not fall under a protocol.     Last Rx: 12/24/19  LOV: 1/10/20

## 2020-02-18 RX ORDER — ALPRAZOLAM 0.25 MG/1
TABLET ORAL
Qty: 30 TABLET | Refills: 0 | Status: SHIPPED | OUTPATIENT
Start: 2020-02-18 | End: 2020-03-29

## 2020-02-20 ENCOUNTER — PATIENT OUTREACH (OUTPATIENT)
Dept: CASE MANAGEMENT | Age: 85
End: 2020-02-20
Payer: MEDICARE

## 2020-02-20 DIAGNOSIS — E78.2 MIXED HYPERLIPIDEMIA: ICD-10-CM

## 2020-02-20 DIAGNOSIS — E11.9 CONTROLLED TYPE 2 DIABETES MELLITUS WITHOUT COMPLICATION, WITHOUT LONG-TERM CURRENT USE OF INSULIN (HCC): ICD-10-CM

## 2020-02-20 DIAGNOSIS — I10 ESSENTIAL HYPERTENSION: ICD-10-CM

## 2020-02-20 DIAGNOSIS — Z91.81 AT RISK FOR FALLS: ICD-10-CM

## 2020-02-20 NOTE — PROGRESS NOTES
2/20/2020  Spoke to [facility] for CCM. Updates to patient care team/ comments: UTD  Patient reported changes in medications: None  Med Adherence  Comment: Taking as directed    Health Maintenance:Reviewed with patient. Encouraged up dating.   DEXA Scan du frequently and avoid touching eyes,nose and mouth. Patient agrees to goal action plan.  yes  Self-Management Abilities (patient reported)             1= least confident in achieving goal, 5= very confident               - confidence: Neena White

## 2020-02-25 ENCOUNTER — OFFICE VISIT (OUTPATIENT)
Dept: INTERNAL MEDICINE CLINIC | Facility: CLINIC | Age: 85
End: 2020-02-25
Payer: MEDICARE

## 2020-02-25 VITALS
BODY MASS INDEX: 38.71 KG/M2 | SYSTOLIC BLOOD PRESSURE: 129 MMHG | WEIGHT: 205 LBS | DIASTOLIC BLOOD PRESSURE: 73 MMHG | TEMPERATURE: 99 F | HEART RATE: 87 BPM | HEIGHT: 61 IN

## 2020-02-25 DIAGNOSIS — M19.012 PRIMARY OSTEOARTHRITIS OF LEFT SHOULDER: ICD-10-CM

## 2020-02-25 DIAGNOSIS — I10 ESSENTIAL HYPERTENSION: ICD-10-CM

## 2020-02-25 DIAGNOSIS — J18.9 COMMUNITY ACQUIRED PNEUMONIA, UNSPECIFIED LATERALITY: ICD-10-CM

## 2020-02-25 DIAGNOSIS — M06.9 RHEUMATOID ARTHRITIS INVOLVING MULTIPLE SITES, UNSPECIFIED RHEUMATOID FACTOR PRESENCE: ICD-10-CM

## 2020-02-25 DIAGNOSIS — M48.062 SPINAL STENOSIS OF LUMBAR REGION WITH NEUROGENIC CLAUDICATION: ICD-10-CM

## 2020-02-25 DIAGNOSIS — E78.2 MIXED HYPERLIPIDEMIA: ICD-10-CM

## 2020-02-25 DIAGNOSIS — J10.1 INFLUENZA A: Primary | ICD-10-CM

## 2020-02-25 DIAGNOSIS — E11.9 CONTROLLED TYPE 2 DIABETES MELLITUS WITHOUT COMPLICATION, WITHOUT LONG-TERM CURRENT USE OF INSULIN (HCC): ICD-10-CM

## 2020-02-25 PROCEDURE — G0463 HOSPITAL OUTPT CLINIC VISIT: HCPCS | Performed by: INTERNAL MEDICINE

## 2020-02-25 PROCEDURE — 99214 OFFICE O/P EST MOD 30 MIN: CPT | Performed by: INTERNAL MEDICINE

## 2020-02-25 PROCEDURE — 1111F DSCHRG MED/CURRENT MED MERGE: CPT | Performed by: INTERNAL MEDICINE

## 2020-02-25 NOTE — PROGRESS NOTES
HPI:    Patient ID: Hetal Solano is a 82year old female. Patient presents today for post hospital follow-up. She was admitted at [facility] about 2 weeks ago.   At that time she presented with generalized weakness and low blood for flu and pneumonia, treated with IV abx and tamiflu, O2, steroids and nebulizers) for the symptoms. The treatment provided significant relief. Review of Systems   Constitutional: Positive for diaphoresis, fatigue and fever.    HENT: Positive for co daily.     • methotrexate (RHEUMATREX) 2.5 MG Oral Tab Take 2.5 mg by mouth. TAKE 6 TAB ONCE A WEEK. PATIENT TAKING EVERY Thursday. • Lansoprazole (PREVACID) 15 MG Oral Capsule Delayed Release Take 15 mg by mouth daily.      • Red Yeast Rice 600 MG Ora exhibits no discharge. Left eye exhibits no discharge. Neck: Normal range of motion. Neck supple. No JVD present. No thyromegaly present. Cardiovascular: Normal rate, regular rhythm and normal heart sounds.    Pulmonary/Chest: Effort normal and breath s scheduled for April.   Patient states that she has been cleared by her cardiologist for this surgery.    (M06.9) Rheumatoid arthritis involving multiple sites, unspecified rheumatoid factor presence ([address]  Plan: He is continue to be followed up by rheumatol

## 2020-02-29 PROCEDURE — 99490 CHRNC CARE MGMT STAFF 1ST 20: CPT

## 2020-03-06 RX ORDER — EZETIMIBE 10 MG/1
TABLET ORAL
Qty: 90 TABLET | Refills: 1 | Status: SHIPPED | OUTPATIENT
Start: 2020-03-06 | End: 2020-08-10

## 2020-03-06 NOTE — TELEPHONE ENCOUNTER
Glipizide ER 5 mg does not pass protocol - last A1C on 7/19/19. Please advise.          Cholesterol Medications  Protocol Criteria:  · Appointment scheduled in the past 12 months or in the next 3 months  · ALT & LDL on file in the past 12 months  · ALT re

## 2020-03-08 RX ORDER — GLIPIZIDE 5 MG/1
TABLET, FILM COATED, EXTENDED RELEASE ORAL
Qty: 90 TABLET | Refills: 1 | Status: SHIPPED | OUTPATIENT
Start: 2020-03-08 | End: 2020-08-10

## 2020-03-09 ENCOUNTER — PATIENT OUTREACH (OUTPATIENT)
Dept: CASE MANAGEMENT | Age: 85
End: 2020-03-09
Payer: MEDICARE

## 2020-03-09 DIAGNOSIS — F41.1 GENERALIZED ANXIETY DISORDER: ICD-10-CM

## 2020-03-09 DIAGNOSIS — I10 ESSENTIAL HYPERTENSION: ICD-10-CM

## 2020-03-09 DIAGNOSIS — E11.9 CONTROLLED TYPE 2 DIABETES MELLITUS WITHOUT COMPLICATION, WITHOUT LONG-TERM CURRENT USE OF INSULIN (HCC): ICD-10-CM

## 2020-03-09 NOTE — PROGRESS NOTES
3/9/2020  Spoke to [facility] for CCM. Updates to patient care team/ comments: UTD  Patient reported changes in medications: None  Med Adherence  Comment: Taking as directed    Health Maintenance: Reviewed with patient.    DEXA Scan due on 07/16/2002  Diab reported)             1= least confident in achieving goal, 5= very confident               - confidence: 5    Care Manager Follow Up: One month    Reason For Follow Up: review progress and or barriers towards patients goals.      Care managers intervention

## 2020-03-29 RX ORDER — ALPRAZOLAM 0.25 MG/1
TABLET ORAL
Qty: 30 TABLET | Refills: 0 | Status: SHIPPED | OUTPATIENT
Start: 2020-03-29 | End: 2020-05-08

## 2020-03-31 PROCEDURE — 99490 CHRNC CARE MGMT STAFF 1ST 20: CPT

## 2020-04-08 ENCOUNTER — NURSE TRIAGE (OUTPATIENT)
Dept: INTERNAL MEDICINE CLINIC | Facility: CLINIC | Age: 85
End: 2020-04-08

## 2020-04-08 RX ORDER — NYSTATIN 100000 [USP'U]/G
1 POWDER TOPICAL 2 TIMES DAILY
Qty: 100 G | Refills: 1 | Status: SHIPPED | OUTPATIENT
Start: 2020-04-08 | End: 2021-03-17 | Stop reason: ALTCHOICE

## 2020-04-08 NOTE — TELEPHONE ENCOUNTER
Action Requested: Summary for Provider     []  Critical Lab, Recommendations Needed  [] Need Additional Advice  []   FYI    []   Need Orders  [x] Need Medications Sent to Pharmacy  []  Other     SUMMARY: Requesting same powder that pt states was prescribed

## 2020-04-21 ENCOUNTER — PATIENT OUTREACH (OUTPATIENT)
Dept: CASE MANAGEMENT | Age: 85
End: 2020-04-21

## 2020-04-21 DIAGNOSIS — E11.9 CONTROLLED TYPE 2 DIABETES MELLITUS WITHOUT COMPLICATION, WITHOUT LONG-TERM CURRENT USE OF INSULIN (HCC): ICD-10-CM

## 2020-04-21 DIAGNOSIS — F41.1 GENERALIZED ANXIETY DISORDER: ICD-10-CM

## 2020-04-21 DIAGNOSIS — I10 ESSENTIAL HYPERTENSION: ICD-10-CM

## 2020-04-21 DIAGNOSIS — E78.2 MIXED HYPERLIPIDEMIA: ICD-10-CM

## 2020-04-21 RX ORDER — CARVEDILOL 12.5 MG/1
12.5 TABLET ORAL 2 TIMES DAILY WITH MEALS
COMMUNITY
End: 2020-05-13

## 2020-04-21 NOTE — PROGRESS NOTES
4/21/2020  Spoke to [employer] for CCM.       Updates to patient care team/ comments: UTD  Patient reported changes in medications: Metoprolol was stopped and replaced with Carvedilol 12.5 mg bid changes made by Cardiologist.   Med Adherence  Comment: Taking as Patient agrees to goal action plan. yes  Self-Management Abilities (patient reported)             1= least confident in achieving goal, 5= very confident               - confidence:4  Care Manager Follow Up: two weeks, f/u on depression.     Reason Fo

## 2020-04-30 PROCEDURE — 99490 CHRNC CARE MGMT STAFF 1ST 20: CPT

## 2020-05-08 RX ORDER — ALPRAZOLAM 0.25 MG/1
TABLET ORAL
Qty: 30 TABLET | Refills: 0 | Status: SHIPPED | OUTPATIENT
Start: 2020-05-08 | End: 2020-06-19

## 2020-05-12 ENCOUNTER — TELEPHONE (OUTPATIENT)
Dept: INTERNAL MEDICINE CLINIC | Facility: CLINIC | Age: 85
End: 2020-05-12

## 2020-05-12 NOTE — TELEPHONE ENCOUNTER
Patient is having left shoulder surgery on 6/3 but requires clearance. Patient requesting in person pre-surgical visit, please advise.

## 2020-05-13 ENCOUNTER — OFFICE VISIT (OUTPATIENT)
Dept: INTERNAL MEDICINE CLINIC | Facility: CLINIC | Age: 85
End: 2020-05-13
Payer: MEDICARE

## 2020-05-13 VITALS
WEIGHT: 212 LBS | HEART RATE: 83 BPM | TEMPERATURE: 97 F | DIASTOLIC BLOOD PRESSURE: 70 MMHG | OXYGEN SATURATION: 95 % | BODY MASS INDEX: 40.02 KG/M2 | SYSTOLIC BLOOD PRESSURE: 138 MMHG | HEIGHT: 61 IN

## 2020-05-13 DIAGNOSIS — I25.10 CORONARY ARTERIOSCLEROSIS IN NATIVE ARTERY: ICD-10-CM

## 2020-05-13 DIAGNOSIS — Z86.73 HISTORY OF STROKE: ICD-10-CM

## 2020-05-13 DIAGNOSIS — M06.9 RHEUMATOID ARTHRITIS INVOLVING MULTIPLE SITES, UNSPECIFIED RHEUMATOID FACTOR PRESENCE: ICD-10-CM

## 2020-05-13 DIAGNOSIS — I10 ESSENTIAL HYPERTENSION: ICD-10-CM

## 2020-05-13 DIAGNOSIS — E11.9 CONTROLLED TYPE 2 DIABETES MELLITUS WITHOUT COMPLICATION, WITHOUT LONG-TERM CURRENT USE OF INSULIN (HCC): ICD-10-CM

## 2020-05-13 DIAGNOSIS — Z01.818 PREOP GENERAL PHYSICAL EXAM: Primary | ICD-10-CM

## 2020-05-13 DIAGNOSIS — E78.2 MIXED HYPERLIPIDEMIA: ICD-10-CM

## 2020-05-13 DIAGNOSIS — M19.012 PRIMARY OSTEOARTHRITIS OF LEFT SHOULDER: ICD-10-CM

## 2020-05-13 PROCEDURE — G0463 HOSPITAL OUTPT CLINIC VISIT: HCPCS | Performed by: INTERNAL MEDICINE

## 2020-05-13 PROCEDURE — 99214 OFFICE O/P EST MOD 30 MIN: CPT | Performed by: INTERNAL MEDICINE

## 2020-05-13 RX ORDER — ACETAMINOPHEN AND CODEINE PHOSPHATE 300; 30 MG/1; MG/1
1 TABLET ORAL EVERY 8 HOURS PRN
COMMUNITY
Start: 2020-04-30 | End: 2021-04-25

## 2020-05-13 NOTE — PROGRESS NOTES
HPI:    Patient ID: Beatriz Yeh is a 82year old female. Patient presents today fore preop medical clearance as requested by Dr Eddie Ewing orthopedist for planned  Left shoulder joint replacement surgery to be done at [facility] on Dagmar 3, 2020.        Review o MG Oral Tab Take 1 tablet (1 mg total) by mouth daily. 90 tablet 0   • aspirin 81 MG Oral Tab Take 1 tablet by mouth daily. • Calcium Carb-Cholecalciferol (CALCIUM + D3) 600-200 MG-UNIT Oral Tab Take 1 tablet by mouth daily.      • methotrexate (RHEUMAT ASSESSMENT/PLAN:   (V36.053) Preop general physical exam  (primary encounter diagnosis)  Plan: pt will be getting her preop labs at [facility] and will call us for result.   her revised cardiac risk index score is 2 and risk of MACE is >1%; she had been seen b

## 2020-05-16 ENCOUNTER — TELEPHONE (OUTPATIENT)
Dept: INTERNAL MEDICINE CLINIC | Facility: CLINIC | Age: 85
End: 2020-05-16

## 2020-05-16 DIAGNOSIS — Z01.818 PREOPERATIVE TESTING: Primary | ICD-10-CM

## 2020-05-16 NOTE — TELEPHONE ENCOUNTER
Received fax from Aicha Rose and [facility]for pre-op. Patient had pre-op physical on 5/13/20. Do not see any new lab orders or EKG that form states are required.  Form for clearance placed on Dr. Tree Pinto desk to corinna

## 2020-05-19 ENCOUNTER — TELEPHONE (OUTPATIENT)
Dept: INTERNAL MEDICINE CLINIC | Facility: CLINIC | Age: 85
End: 2020-05-19

## 2020-05-19 NOTE — TELEPHONE ENCOUNTER
S/W patient who states she will have pre-op labs and EKG done at [facility]. States she is having excruciating left shoulde pain. States Dr. Nan Manzano prescribed Codeine pain med for her and told her to take Benadryl with it since she is allergic to the Codeine.  Shawanda Deluna

## 2020-05-19 NOTE — TELEPHONE ENCOUNTER
Pls check with pt if she was given orders by ortho to do her preop labs, if not, I had put in orders and she can do it at [facility] since she is doing her surgery at that hospital.

## 2020-05-19 NOTE — TELEPHONE ENCOUNTER
Please refer to 5/19/20 telephone encounter.  Patient will have pre-ops done at [facility]

## 2020-05-21 ENCOUNTER — LAB ENCOUNTER (OUTPATIENT)
Dept: LAB | Age: 85
End: 2020-05-21
Attending: INTERNAL MEDICINE
Payer: MEDICARE

## 2020-05-21 DIAGNOSIS — E78.5 HYPERLIPIDEMIA, UNSPECIFIED HYPERLIPIDEMIA TYPE: ICD-10-CM

## 2020-05-21 DIAGNOSIS — Z01.818 PREOPERATIVE TESTING: ICD-10-CM

## 2020-05-21 PROCEDURE — 80061 LIPID PANEL: CPT

## 2020-05-21 PROCEDURE — 36415 COLL VENOUS BLD VENIPUNCTURE: CPT

## 2020-05-21 PROCEDURE — 81001 URINALYSIS AUTO W/SCOPE: CPT

## 2020-05-21 PROCEDURE — 80053 COMPREHEN METABOLIC PANEL: CPT

## 2020-05-21 PROCEDURE — 85610 PROTHROMBIN TIME: CPT

## 2020-05-21 PROCEDURE — 85025 COMPLETE CBC W/AUTO DIFF WBC: CPT

## 2020-05-22 ENCOUNTER — PATIENT OUTREACH (OUTPATIENT)
Dept: CASE MANAGEMENT | Age: 85
End: 2020-05-22

## 2020-05-22 DIAGNOSIS — M06.9 RHEUMATOID ARTHRITIS INVOLVING MULTIPLE SITES, UNSPECIFIED RHEUMATOID FACTOR PRESENCE: ICD-10-CM

## 2020-05-22 DIAGNOSIS — I10 ESSENTIAL HYPERTENSION: ICD-10-CM

## 2020-05-22 DIAGNOSIS — I25.10 CORONARY ARTERIOSCLEROSIS IN NATIVE ARTERY: ICD-10-CM

## 2020-05-22 NOTE — PROGRESS NOTES
5/22/2020  Spoke to [facility] for CCM.       Updates to patient care team/ comments: UTD  Patient reported changes in medications: None  Med Adherence  Comment: Taking as directed    Health Maintenance: Reviewed and encouraged updating once public health vu pain med for surgery as she is allergist to codeine.              Patient agrees to goal action plan.  yes  Self-Management Abilities (patient reported)             1= least confident in achieving goal, 5= very confident               - confidence:4    Care

## 2020-05-27 ENCOUNTER — TELEPHONE (OUTPATIENT)
Dept: INTERNAL MEDICINE CLINIC | Facility: CLINIC | Age: 85
End: 2020-05-27

## 2020-05-27 NOTE — TELEPHONE ENCOUNTER
The hospital is the one requesting ekg so we still need to do it just to comply with their requirement.  thanks

## 2020-05-27 NOTE — TELEPHONE ENCOUNTER
Called pt and informed about EKG, I transferred her to Central scheduling to schedule an appt to have her EKG done.

## 2020-05-27 NOTE — TELEPHONE ENCOUNTER
Spoke with pt and states Dr. Mg Warren did not need an EKG and she was received her cardiac clearance. Pt was informed of lab results. Dr. Zara Gilford pls advise

## 2020-05-27 NOTE — TELEPHONE ENCOUNTER
pls call pt; she didn't do preop ekg ordered in epic. She needs to get this done. Her preop labs otherwise were good and her cholesterol levels in good range.    Also check with pt if she did get cardiac clearance from her cardiologist DR Leonor Phipps for her [facility]

## 2020-05-28 ENCOUNTER — TELEPHONE (OUTPATIENT)
Dept: INTERNAL MEDICINE CLINIC | Facility: CLINIC | Age: 85
End: 2020-05-28

## 2020-05-28 ENCOUNTER — APPOINTMENT (OUTPATIENT)
Dept: LAB | Age: 85
End: 2020-05-28
Attending: INTERNAL MEDICINE
Payer: MEDICARE

## 2020-05-28 DIAGNOSIS — Z01.818 PREOPERATIVE TESTING: ICD-10-CM

## 2020-05-28 PROCEDURE — 93005 ELECTROCARDIOGRAM TRACING: CPT

## 2020-05-28 PROCEDURE — 93010 ELECTROCARDIOGRAM REPORT: CPT | Performed by: INTERNAL MEDICINE

## 2020-05-28 NOTE — TELEPHONE ENCOUNTER
Called [facility] and left message informing her that pt had her EKG today and waiting for clearance from Dr. Jamaal Mendez

## 2020-05-28 NOTE — TELEPHONE ENCOUNTER
Myrtle Goldsmith from [facility] is requesting all tests, physical and pre surgical clearance. Patient is scheduled for surgery 6/3.     Fax  [phone]

## 2020-05-29 NOTE — TELEPHONE ENCOUNTER
April/ RN of [facility] is calling to follow up on status of Medical clearance and lab work/ EKG.     Please advise    Fax# [phone]

## 2020-05-31 PROCEDURE — 99490 CHRNC CARE MGMT STAFF 1ST 20: CPT

## 2020-06-01 ENCOUNTER — TELEPHONE (OUTPATIENT)
Dept: INTERNAL MEDICINE CLINIC | Facility: CLINIC | Age: 85
End: 2020-06-01

## 2020-06-01 NOTE — TELEPHONE ENCOUNTER
Verified pt name and . Reviewed doctor's recommendations as noted below. Advised pt to call back if symptoms worsen, pt agreed with plan of care and had no further questions at this time.

## 2020-06-01 NOTE — TELEPHONE ENCOUNTER
Spoke with pt who states she has a \"cold sore\" on her bottom lip and \"I have surgery for my shoulder on Wednesday and I don't know if it will be a big deal to the doctors because they have to get into my mouth to knock me out\"    Pt states sore is smal

## 2020-06-01 NOTE — TELEPHONE ENCOUNTER
Patient is requesting a call from Dr. Nathalia Rodríguez nurse to discuss a cold sore she has on her lip. Please advise.

## 2020-06-14 RX ORDER — GABAPENTIN 300 MG/1
CAPSULE ORAL
Qty: 270 CAPSULE | Refills: 1 | Status: SHIPPED | OUTPATIENT
Start: 2020-06-14 | End: 2021-02-14

## 2020-06-19 RX ORDER — ALPRAZOLAM 0.25 MG/1
TABLET ORAL
Qty: 30 TABLET | Refills: 0 | Status: SHIPPED | OUTPATIENT
Start: 2020-06-19 | End: 2020-07-30

## 2020-06-22 ENCOUNTER — NURSE TRIAGE (OUTPATIENT)
Dept: INTERNAL MEDICINE CLINIC | Facility: CLINIC | Age: 85
End: 2020-06-22

## 2020-06-22 DIAGNOSIS — R30.0 DYSURIA: Primary | ICD-10-CM

## 2020-06-22 RX ORDER — NITROFURANTOIN 25; 75 MG/1; MG/1
100 CAPSULE ORAL 2 TIMES DAILY
Qty: 14 CAPSULE | Refills: 0 | Status: SHIPPED | OUTPATIENT
Start: 2020-06-22 | End: 2021-04-19 | Stop reason: ALTCHOICE

## 2020-06-22 NOTE — TELEPHONE ENCOUNTER
Spoke with pt,  verified  Pt informed of MD recommendation, pt stated understanding. Pt stated a HH RN will come to see her today and she will collect the urine sample. Pt will start meds after urine sample already taken.     MEGHNA

## 2020-06-22 NOTE — TELEPHONE ENCOUNTER
Action Requested: Summary for Provider     []  Critical Lab, Recommendations Needed  [x] Need Additional Advice  []   FYI    [x]   Need Orders  [x] Need Medications Sent to Pharmacy  []  Other     SUMMARY: Patient calling with complaint of urinary urgency,

## 2020-06-22 NOTE — TELEPHONE ENCOUNTER
Ok fo ua and culture, order signed. Will start pt on macrobid 100mg po bid for one week. Start after urine sample already taken. Thanks.

## 2020-06-25 ENCOUNTER — PATIENT OUTREACH (OUTPATIENT)
Dept: CASE MANAGEMENT | Age: 85
End: 2020-06-25

## 2020-06-25 ENCOUNTER — TELEPHONE (OUTPATIENT)
Dept: INTERNAL MEDICINE CLINIC | Facility: CLINIC | Age: 85
End: 2020-06-25

## 2020-06-25 DIAGNOSIS — I25.10 CORONARY ARTERIOSCLEROSIS IN NATIVE ARTERY: ICD-10-CM

## 2020-06-25 DIAGNOSIS — I10 ESSENTIAL HYPERTENSION: ICD-10-CM

## 2020-06-25 DIAGNOSIS — E11.9 CONTROLLED TYPE 2 DIABETES MELLITUS WITHOUT COMPLICATION, WITHOUT LONG-TERM CURRENT USE OF INSULIN (HCC): ICD-10-CM

## 2020-06-25 NOTE — TELEPHONE ENCOUNTER
Urine sample was not a good sample so will not be accurate. Pt already had been started on abx last week. If her symptoms are better and resolved, will not need any further testing.

## 2020-06-25 NOTE — PROGRESS NOTES
6/25/2020  Spoke to [facility] for CCM.       Updates to patient care team/ comments: UTD  Patient reported changes in medications: None  Med Adherence  Comment: Taking as directed    Health Maintenance:Reviewed and encouraged updating once public health concer             - confidence:4     Care Manager Follow Up: One month    Reason For Follow Up: review progress and or barriers towards patients goals.      Care managers interventions: Encouraged three balanced meals along daily exercise such as going on short

## 2020-06-25 NOTE — TELEPHONE ENCOUNTER
Received UA and Cult[facility]. Placed in Dr. Granger Dress desk to review.

## 2020-06-26 NOTE — TELEPHONE ENCOUNTER
S/W patient and related Dr. Nathalia Rodríguez message below. Patient  states symptoms are much better. States she is taking antibiotic. States she will call Monday if symptoms are not gone or are worse.

## 2020-06-30 PROCEDURE — 99490 CHRNC CARE MGMT STAFF 1ST 20: CPT

## 2020-07-01 ENCOUNTER — TELEPHONE (OUTPATIENT)
Dept: INTERNAL MEDICINE CLINIC | Facility: CLINIC | Age: 85
End: 2020-07-01

## 2020-07-01 DIAGNOSIS — R30.0 DYSURIA: Primary | ICD-10-CM

## 2020-07-01 RX ORDER — CEPHALEXIN 500 MG/1
500 CAPSULE ORAL 2 TIMES DAILY
Qty: 14 CAPSULE | Refills: 0 | Status: SHIPPED | OUTPATIENT
Start: 2020-07-01 | End: 2021-04-25

## 2020-07-01 NOTE — TELEPHONE ENCOUNTER
She will need urinalysis and urine culture again. Urine collected should be clean midstream catch to make sure not contaminated (last urine sample she had given wasn't a good sample).   Once sample is given, we can put her on keflex 500mg po bid for one wee

## 2020-07-01 NOTE — TELEPHONE ENCOUNTER
Clifford Campos will send erx for keflex. Have pt call if symptoms persist. Also have pt take probiotic or yogurt when taking abx.

## 2020-07-01 NOTE — TELEPHONE ENCOUNTER
Dr. Les Arcos: patient asked if you are able to send RX instead? Patient informed of orders. Patient unable to drop off specimen. She doesn't drive and has no one to take her. [facility] discharged patient.

## 2020-07-01 NOTE — TELEPHONE ENCOUNTER
Please advise: The patient had a UTI on 6/22/20 and taken the Macrobid and the last one yesterday and also AZO. Today AZO today and continues to have burning with urination, frequency, urgency, and is starting to have an odor.     Denies any blood i

## 2020-07-10 ENCOUNTER — TELEPHONE (OUTPATIENT)
Dept: INTERNAL MEDICINE CLINIC | Facility: CLINIC | Age: 85
End: 2020-07-10

## 2020-07-10 NOTE — TELEPHONE ENCOUNTER
Pt was called and informed of Dr. Sudha Lang message below and she verbalized understanding.  Thanks

## 2020-07-10 NOTE — TELEPHONE ENCOUNTER
Yes I agree . based on cdc guidelines she isnt a candidate for testing. If she does really want to get tested though, there are testing sites she may want to try/check but with [facility], she will not qualify.    Would advise also to watch Katelyn Escalona for any sympto

## 2020-07-27 ENCOUNTER — PATIENT OUTREACH (OUTPATIENT)
Dept: CASE MANAGEMENT | Age: 85
End: 2020-07-27

## 2020-07-27 DIAGNOSIS — I25.10 CORONARY ARTERIOSCLEROSIS IN NATIVE ARTERY: ICD-10-CM

## 2020-07-27 DIAGNOSIS — I10 ESSENTIAL HYPERTENSION: ICD-10-CM

## 2020-07-27 DIAGNOSIS — E11.9 CONTROLLED TYPE 2 DIABETES MELLITUS WITHOUT COMPLICATION, WITHOUT LONG-TERM CURRENT USE OF INSULIN (HCC): ICD-10-CM

## 2020-07-27 NOTE — PROGRESS NOTES
7/27/2020  Spoke to [facility] for CCM.       Updates to patient care team/ comments: UTD  Patient reported changes in medications: None  Med Adherence  Comment: Taking as directed    Health Maintenance:Reviewed and encouraged updating once public health concer Spent This Encounter Total: 21 min medical record review, telephone communication, care plan updates where needed, education, goals and action plan recreation/update. Provided acknowledgment and validation to patient's concerns.    Monthly Minute Total incl

## 2020-07-30 RX ORDER — ALPRAZOLAM 0.25 MG/1
TABLET ORAL
Qty: 30 TABLET | Refills: 0 | Status: SHIPPED | OUTPATIENT
Start: 2020-07-30 | End: 2020-09-30

## 2020-07-31 PROCEDURE — 99490 CHRNC CARE MGMT STAFF 1ST 20: CPT

## 2020-08-10 RX ORDER — EZETIMIBE 10 MG/1
TABLET ORAL
Qty: 90 TABLET | Refills: 1 | Status: SHIPPED | OUTPATIENT
Start: 2020-08-10 | End: 2021-02-23

## 2020-08-10 RX ORDER — GLIPIZIDE 5 MG/1
TABLET, FILM COATED, EXTENDED RELEASE ORAL
Qty: 90 TABLET | Refills: 1 | Status: SHIPPED | OUTPATIENT
Start: 2020-08-10 | End: 2021-02-23

## 2020-08-18 ENCOUNTER — TELEPHONE (OUTPATIENT)
Dept: INTERNAL MEDICINE CLINIC | Facility: CLINIC | Age: 85
End: 2020-08-18

## 2020-08-18 NOTE — TELEPHONE ENCOUNTER
Pt informed per immunization record in chart, has not had flu shot here this year and is up-to-date with Prevnar 13 and pneumovax 23 . [facility] is offering free flu vaccines so will be getting it there.  Denies further questions/concerns at this time

## 2020-08-18 NOTE — TELEPHONE ENCOUNTER
Patient requesting call to discuss if she recently received flu shot and pneumonia and would it be ok to have another one if she already had one. Please advise.

## 2020-09-10 ENCOUNTER — PATIENT OUTREACH (OUTPATIENT)
Dept: CASE MANAGEMENT | Age: 85
End: 2020-09-10

## 2020-09-10 DIAGNOSIS — E11.9 CONTROLLED TYPE 2 DIABETES MELLITUS WITHOUT COMPLICATION, WITHOUT LONG-TERM CURRENT USE OF INSULIN (HCC): ICD-10-CM

## 2020-09-10 DIAGNOSIS — Z91.81 AT RISK FOR FALLS: ICD-10-CM

## 2020-09-10 DIAGNOSIS — I10 ESSENTIAL HYPERTENSION: ICD-10-CM

## 2020-09-10 DIAGNOSIS — I25.10 CORONARY ARTERIOSCLEROSIS IN NATIVE ARTERY: ICD-10-CM

## 2020-09-10 NOTE — PROGRESS NOTES
Contacted DME manny Morris waited 25 minutes on hold before it prompted me to leave a voice mail.  [facility]    Call Us [phone]  Fax [phone]    Total time -26  min  Total Monthly time-  62 min

## 2020-09-10 NOTE — PROGRESS NOTES
9/10/2020  Spoke to [facility] for CCM. Updates to patient care team/ comments: UTD  Patient reported changes in medications: None  Med Adherence  Comment: Taking as directed    Health Maintenance: Reviewed with patient.    DEXA Scan due on 07/16/2002  Kate all barriers: n/a           Patient agrees to goal action plan. yes  Self-Management Abilities (patient reported)             1= least confident in achieving goal, 5= very confident               - confidence:4     Care Manager Follow Up:  One month    Reas

## 2020-09-10 NOTE — PROGRESS NOTES
Contacted [facility] they do not supply chucks or depends.      TOTAL TIME: 3 MIN  MONTHLY TOTAL:34 MIN

## 2020-09-10 NOTE — PROGRESS NOTES
[address] below they do not supply depends or chucks.     C[address]Tyler Par[address] Final  [address]  [address], [address]  [phone]    TOTAL TIME: 3 MIN  MONTHLY TOTAL:36 MIN

## 2020-09-11 ENCOUNTER — PATIENT OUTREACH (OUTPATIENT)
Dept: CASE MANAGEMENT | Age: 85
End: 2020-09-11

## 2020-09-11 NOTE — PROGRESS NOTES
Spoke to pt advised I was not succesful locating DME that would cover liners or depends. Advised I reached out to [facility] [employer] and they six boxes of depends she may  tomorrow. Advised pt on address, pt inquired about size of depends.  Called L

## 2020-09-30 PROCEDURE — 99490 CHRNC CARE MGMT STAFF 1ST 20: CPT

## 2020-09-30 PROCEDURE — G2058 CCM ADD 20MIN: HCPCS

## 2020-09-30 RX ORDER — ALPRAZOLAM 0.25 MG/1
TABLET ORAL
Qty: 30 TABLET | Refills: 0 | Status: SHIPPED | OUTPATIENT
Start: 2020-09-30 | End: 2020-11-11

## 2020-10-19 ENCOUNTER — PATIENT OUTREACH (OUTPATIENT)
Dept: CASE MANAGEMENT | Age: 85
End: 2020-10-19

## 2020-10-19 DIAGNOSIS — I10 ESSENTIAL HYPERTENSION: ICD-10-CM

## 2020-10-19 DIAGNOSIS — I25.10 CORONARY ARTERIOSCLEROSIS IN NATIVE ARTERY: ICD-10-CM

## 2020-10-19 DIAGNOSIS — M06.9 RHEUMATOID ARTHRITIS INVOLVING MULTIPLE SITES, UNSPECIFIED WHETHER RHEUMATOID FACTOR PRESENT (HCC): ICD-10-CM

## 2020-10-19 DIAGNOSIS — E78.2 MIXED HYPERLIPIDEMIA: ICD-10-CM

## 2020-10-19 DIAGNOSIS — E11.9 CONTROLLED TYPE 2 DIABETES MELLITUS WITHOUT COMPLICATION, WITHOUT LONG-TERM CURRENT USE OF INSULIN (HCC): ICD-10-CM

## 2020-10-19 NOTE — PROGRESS NOTES
10/19/2020  Spoke to [facility] for CCM. Updates to patient care team/ comments: UTD  Patient reported changes in medications: None  Med Adherence  Comment: Taking as directed    Health Maintenance:Reviewed with pt. She declined PX at this time.   KERI Resendez advised pt ENT and will reach out to ENT as voice is very hoarse. Patient agrees to goal action plan.  yes  Self-Management Abilities (patient reported)             1= least confident in achieving goal, 5= very confident               - confidenc

## 2020-10-31 PROCEDURE — 99490 CHRNC CARE MGMT STAFF 1ST 20: CPT

## 2020-11-10 ENCOUNTER — VIRTUAL PHONE E/M (OUTPATIENT)
Dept: INTERNAL MEDICINE CLINIC | Facility: CLINIC | Age: 85
End: 2020-11-10
Payer: MEDICARE

## 2020-11-10 DIAGNOSIS — K21.9 GASTROESOPHAGEAL REFLUX DISEASE, UNSPECIFIED WHETHER ESOPHAGITIS PRESENT: ICD-10-CM

## 2020-11-10 DIAGNOSIS — Z20.822 SUSPECTED COVID-19 VIRUS INFECTION: Primary | ICD-10-CM

## 2020-11-10 PROCEDURE — 99213 OFFICE O/P EST LOW 20 MIN: CPT | Performed by: INTERNAL MEDICINE

## 2020-11-10 RX ORDER — OMEPRAZOLE 20 MG/1
20 CAPSULE, DELAYED RELEASE ORAL
Qty: 60 CAPSULE | Refills: 0 | Status: SHIPPED | OUTPATIENT
Start: 2020-11-10 | End: 2021-01-11

## 2020-11-10 NOTE — PROGRESS NOTES
Telehealth outside of [address] Verbal Consent     I conducted a telehealth visit with Benito Jordan today, 11/10/20, which was completed using two-way, real-time interactive audio and video communication.  This has been done in good zane to pr Dry []     Productive []    • Dyspnea    Yes []   No [x]   Wheezing []   • Fatigue:   Yes []     No [x]     • Myalgias: Yes []     No [x]     • Chills:  Yes []    No [x]       • Sore throat:    Yes [x]      No []      • Headache:     Yes [x]     No [] virus infection  - SARS-COV-2 BY PCR (); Future    2. Gastroesophageal reflux disease, unspecified whether esophagitis present  - omeprazole 20 MG Oral Capsule Delayed Release;  Take 1 capsule (20 mg total) by mouth every morning before breakfast.  Dis et al., 2013) failed to calculate for the following reasons:     The 2013 ASCVD risk score is only valid for ages 36 to 78    Medical History    Active Problems reviewed:  Patient Active Problem List    At risk for falls      Aortic valve sclerosis      Rhe Medications reviewed:  Current Outpatient Medications   Medication Sig Dispense Refill   • omeprazole 20 MG Oral Capsule Delayed Release Take 1 capsule (20 mg total) by mouth every morning before breakfast. 60 capsule 0   • ALPRAZolam 0.25 MG Oral Tab TAKE Red Yeast Rice 600 MG Oral Cap Take 2 tablets by mouth daily. Patient advised to call office with any questions or seek emergency care if necessary. Patient understands and agrees to follow directions and advice.       Tyrese Mcknight MD  [facility]

## 2020-11-11 ENCOUNTER — LAB ENCOUNTER (OUTPATIENT)
Dept: LAB | Age: 85
End: 2020-11-11
Attending: INTERNAL MEDICINE
Payer: MEDICARE

## 2020-11-11 DIAGNOSIS — Z20.822 EXPOSURE TO COVID-19 VIRUS: Primary | ICD-10-CM

## 2020-11-11 RX ORDER — ALPRAZOLAM 0.25 MG/1
TABLET ORAL
Qty: 30 TABLET | Refills: 0 | Status: SHIPPED | OUTPATIENT
Start: 2020-11-11 | End: 2021-01-12

## 2020-11-13 ENCOUNTER — TELEPHONE (OUTPATIENT)
Dept: INTERNAL MEDICINE CLINIC | Facility: CLINIC | Age: 85
End: 2020-11-13

## 2020-11-14 NOTE — TELEPHONE ENCOUNTER
Noted  - As of today pending test result.      FYI 88 year old male with chronic  lymphocytic leukemia complicated  with right pleural infection and recurrent pleural  effusion who came to ambulatory surgery for drainage of his complicated effusion. On putting in a pigtail, only 100cc of jelly like serosanguinous fluid was obtained. Thus the patient was admitted for the MIST protocol given the complexity of the effusion and for intravenous antibiotics. The patient recently was admitted to the hospital 4 weeks ago where he had 2 pigtail catheters placed. 1/2 grew pseudomonas in the culture. The patient has had a recurrent of the complex pleural effusion. Previously the fluid was exudative in nature and was considered either infectious etiology vs CLL.   Pt had a pigtail to right chest and removed after 6 days, when drainage was les than 100cc of fluid.   Pt remained afebrile and medically stable.  Pt was continued on augmentin upon discharge.  PT evaluated, recommended home with home PT    dispo: to home with home PT 88 year old male with chronic  lymphocytic leukemia complicated  with right pleural infection and recurrent pleural  effusion who came to ambulatory surgery for drainage of his complicated effusion. On putting in a pigtail, only 100cc of jelly like serosanguinous fluid was obtained. Thus the patient was admitted for the MIST protocol given the complexity of the effusion and for intravenous antibiotics. The patient recently was admitted to the hospital 4 weeks ago where he had a pigtail catheters placed after 1/2 grew pseudomonas in the culture. The patient has had a recurrent of the complex pleural effusion noted during outpatient visit and was scheduled for thoracentesis/CT placement for evaluation of pleural fluid. Previously the fluid was exudative in nature and was considered either infectious etiology vs CLL.   Pt had a pigtail to right chest, underwent MIST protocol. CT was removed after 6 days, when drainage was les than 100cc of fluid.   Pt remained afebrile and medically stable, complex pleural fluid was evacuate completely.  Pt was continued on augmentin upon discharge.  PT evaluated, recommended home with home PT    dispo: to home with home PT

## 2020-11-24 ENCOUNTER — TELEPHONE (OUTPATIENT)
Dept: INTERNAL MEDICINE CLINIC | Facility: CLINIC | Age: 85
End: 2020-11-24

## 2020-11-24 NOTE — TELEPHONE ENCOUNTER
Was prescribed Prednisone upon discharge from [facility] on Thursday 11/19/20. Started Prednisone on 11- (5pills total). Threw out the bottle and cannot remember the dosage. Also started a water pill and cannot find that bottle either.

## 2020-11-25 ENCOUNTER — VIRTUAL PHONE E/M (OUTPATIENT)
Dept: INTERNAL MEDICINE CLINIC | Facility: CLINIC | Age: 85
End: 2020-11-25
Payer: MEDICARE

## 2020-11-25 ENCOUNTER — PATIENT OUTREACH (OUTPATIENT)
Dept: CASE MANAGEMENT | Age: 85
End: 2020-11-25

## 2020-11-25 DIAGNOSIS — I25.10 CORONARY ARTERIOSCLEROSIS IN NATIVE ARTERY: ICD-10-CM

## 2020-11-25 DIAGNOSIS — E78.2 MIXED HYPERLIPIDEMIA: ICD-10-CM

## 2020-11-25 DIAGNOSIS — F41.1 GENERALIZED ANXIETY DISORDER: ICD-10-CM

## 2020-11-25 DIAGNOSIS — I10 ESSENTIAL HYPERTENSION: ICD-10-CM

## 2020-11-25 DIAGNOSIS — Z91.81 AT RISK FOR FALLS: ICD-10-CM

## 2020-11-25 DIAGNOSIS — E11.9 CONTROLLED TYPE 2 DIABETES MELLITUS WITHOUT COMPLICATION, WITHOUT LONG-TERM CURRENT USE OF INSULIN (HCC): ICD-10-CM

## 2020-11-25 DIAGNOSIS — J18.9 PNEUMONIA DUE TO INFECTIOUS ORGANISM, UNSPECIFIED LATERALITY, UNSPECIFIED PART OF LUNG: Primary | ICD-10-CM

## 2020-11-25 DIAGNOSIS — M06.9 RHEUMATOID ARTHRITIS INVOLVING MULTIPLE SITES, UNSPECIFIED WHETHER RHEUMATOID FACTOR PRESENT (HCC): ICD-10-CM

## 2020-11-25 PROBLEM — I73.9 PERIPHERAL VASCULAR DISEASE (HCC): Status: ACTIVE | Noted: 2020-06-30

## 2020-11-25 PROBLEM — M21.622 TAILOR'S BUNION OF BOTH FEET: Status: ACTIVE | Noted: 2020-06-30

## 2020-11-25 PROBLEM — M21.621 TAILOR'S BUNION OF BOTH FEET: Status: ACTIVE | Noted: 2020-06-30

## 2020-11-25 PROBLEM — E11.40 DIABETIC NEUROPATHY (HCC): Status: ACTIVE | Noted: 2020-06-30

## 2020-11-25 PROBLEM — I21.4 ACUTE SUBENDOCARDIAL INFARCTION, SUBSEQUENT EPISODE OF CARE (HCC): Status: ACTIVE | Noted: 2020-11-25

## 2020-11-25 PROBLEM — I72.9 ANEURYSM (HCC): Status: ACTIVE | Noted: 2020-11-25

## 2020-11-25 PROBLEM — R01.1 MURMUR: Status: ACTIVE | Noted: 2020-11-25

## 2020-11-25 PROBLEM — I73.9 PERIPHERAL VASCULAR DISEASE: Status: ACTIVE | Noted: 2020-06-30

## 2020-11-25 PROBLEM — I72.9 ANEURYSM: Status: ACTIVE | Noted: 2020-11-25

## 2020-11-25 PROCEDURE — 99442 PHONE E/M BY PHYS 11-20 MIN: CPT | Performed by: INTERNAL MEDICINE

## 2020-11-25 RX ORDER — METHYLPREDNISOLONE 4 MG/1
TABLET ORAL
COMMUNITY
Start: 2020-10-22 | End: 2021-04-25

## 2020-11-25 NOTE — PROGRESS NOTES
11/25/2020  Spoke to [facility] for CCM. Updates to patient care team/ comments: UTD  Patient reported changes in medications: None  Med Adherence  Comment: Taking as directed    Health Maintenance:  Reviewed with patient.  Encouraged updating preventative Up: review progress and or barriers towards patients goals. Care managers interventions: Te was not sent to office as pt called last night and TE was sent to PCP. Discussed Prednisone side effects with pt.  Encouraged her to follow up in office with

## 2020-11-29 NOTE — PROGRESS NOTES
HPI:    Patient ID: Nicanor Kumar is a 82year old female. This is a telemedicine visit with live, interactive v audio.       Patient understands and accepts financial responsibility for any deductible, co-insurance and/or co-pays associated with this • EZETIMIBE 10 MG Oral Tab TAKE 1 TABLET BY MOUTH  NIGHTLY 90 tablet 1   • GLIPIZIDE ER 5 MG Oral Tablet 24 Hr TAKE 1 TABLET BY MOUTH  EVERY DAY IN THE MORNING  WITH BREAKFAST 90 tablet 1   • cephALEXin 500 MG Oral Cap Take 1 capsule (500 mg total) by mo ITCHING  Clavulanic Acid         OTHER (SEE COMMENTS)    Comment:Yeast infection  Codeine                 HIVES  Cefprozil               ITCHING    Comment:Yeast infection  Lisinopril              Coughing   PHYSICAL EXAM:   Physical Exam   Constitutional: the prednisone few days ago. (I10) Essential hypertension  Plan: Continue current blood pressure medications.   CPM.    (E78.2) Mixed hyperlipidemia  Plan: Continue her current cholesterol medication.    (M06.9) Rheumatoid arthritis involving multiple si

## 2020-11-30 PROCEDURE — 99490 CHRNC CARE MGMT STAFF 1ST 20: CPT

## 2020-12-01 ENCOUNTER — PATIENT OUTREACH (OUTPATIENT)
Dept: CASE MANAGEMENT | Age: 85
End: 2020-12-01

## 2020-12-01 DIAGNOSIS — Z91.81 AT RISK FOR FALLS: ICD-10-CM

## 2020-12-01 DIAGNOSIS — E78.2 MIXED HYPERLIPIDEMIA: ICD-10-CM

## 2020-12-01 DIAGNOSIS — I10 ESSENTIAL HYPERTENSION: ICD-10-CM

## 2020-12-01 DIAGNOSIS — E11.9 CONTROLLED TYPE 2 DIABETES MELLITUS WITHOUT COMPLICATION, WITHOUT LONG-TERM CURRENT USE OF INSULIN (HCC): ICD-10-CM

## 2020-12-01 NOTE — PROGRESS NOTES
12/1/2020  Spoke to [facility] for CCM. Updates to patient care team/ comments: UTD  Patient reported changes in medications: None  Med Adherence  Comment: Taking as directed    Health Maintenance:  Reviewed with patient. Encouraged updating.    Zoster Vac my self. • Patient reported progress toward goal: No recurrent ED admissions, she completed abx and steroids from last month. • Update to previous barriers: Pt has recovered from pneumonia.      • Patient Reported New Barriers And Concerns: Hemrroid

## 2020-12-21 ENCOUNTER — NURSE TRIAGE (OUTPATIENT)
Dept: INTERNAL MEDICINE CLINIC | Facility: CLINIC | Age: 85
End: 2020-12-21

## 2020-12-21 NOTE — TELEPHONE ENCOUNTER
Action Requested: Summary for Provider     []  Critical Lab, Recommendations Needed  [x] Need Additional Advice  []   FYI    []   Need Orders  [] Need Medications Sent to Pharmacy  []  Other     SUMMARY: Patient reports pain and swelling behind ear, pain r

## 2020-12-21 NOTE — TELEPHONE ENCOUNTER
Can continue to monitor, continue heat, stretching, massage.  If worsening ok to do telephone to discuss symptoms but may need office/UC depending on if other URI symptoms exist.

## 2020-12-22 NOTE — TELEPHONE ENCOUNTER
Patient was able to to speak on the phone with  Dr. Low Brownlee on 12/21      States better today     Advised to call back with any questions/ concerns Patient verbalizes understanding and agrees.

## 2020-12-31 PROCEDURE — 99490 CHRNC CARE MGMT STAFF 1ST 20: CPT

## 2021-01-08 ENCOUNTER — LAB ENCOUNTER (OUTPATIENT)
Dept: LAB | Age: 86
End: 2021-01-08
Attending: INTERNAL MEDICINE
Payer: MEDICARE

## 2021-01-08 ENCOUNTER — OFFICE VISIT (OUTPATIENT)
Dept: INTERNAL MEDICINE CLINIC | Facility: CLINIC | Age: 86
End: 2021-01-08
Payer: MEDICARE

## 2021-01-08 VITALS
RESPIRATION RATE: 12 BRPM | WEIGHT: 206 LBS | TEMPERATURE: 98 F | SYSTOLIC BLOOD PRESSURE: 134 MMHG | BODY MASS INDEX: 38.89 KG/M2 | HEART RATE: 79 BPM | DIASTOLIC BLOOD PRESSURE: 76 MMHG | HEIGHT: 61 IN

## 2021-01-08 DIAGNOSIS — M06.9 RHEUMATOID ARTHRITIS INVOLVING MULTIPLE SITES, UNSPECIFIED WHETHER RHEUMATOID FACTOR PRESENT (HCC): ICD-10-CM

## 2021-01-08 DIAGNOSIS — Z00.00 MEDICARE ANNUAL WELLNESS VISIT, SUBSEQUENT: Primary | ICD-10-CM

## 2021-01-08 DIAGNOSIS — E78.2 MIXED HYPERLIPIDEMIA: ICD-10-CM

## 2021-01-08 DIAGNOSIS — R51.9 NONINTRACTABLE HEADACHE, UNSPECIFIED CHRONICITY PATTERN, UNSPECIFIED HEADACHE TYPE: ICD-10-CM

## 2021-01-08 DIAGNOSIS — I10 ESSENTIAL HYPERTENSION: ICD-10-CM

## 2021-01-08 DIAGNOSIS — E11.9 CONTROLLED TYPE 2 DIABETES MELLITUS WITHOUT COMPLICATION, WITHOUT LONG-TERM CURRENT USE OF INSULIN (HCC): ICD-10-CM

## 2021-01-08 DIAGNOSIS — Z91.81 RISK FOR FALLS: ICD-10-CM

## 2021-01-08 DIAGNOSIS — F41.1 GENERALIZED ANXIETY DISORDER: ICD-10-CM

## 2021-01-08 DIAGNOSIS — N18.31 STAGE 3A CHRONIC KIDNEY DISEASE (HCC): ICD-10-CM

## 2021-01-08 DIAGNOSIS — I47.1 SUPRAVENTRICULAR TACHYCARDIA (HCC): ICD-10-CM

## 2021-01-08 DIAGNOSIS — I73.9 PERIPHERAL VASCULAR DISEASE (HCC): ICD-10-CM

## 2021-01-08 PROBLEM — R01.1 MURMUR: Status: RESOLVED | Noted: 2020-11-25 | Resolved: 2021-01-08

## 2021-01-08 LAB
ALBUMIN SERPL-MCNC: 3.7 G/DL (ref 3.4–5)
ALBUMIN/GLOB SERPL: 1.1 {RATIO} (ref 1–2)
ALP LIVER SERPL-CCNC: 94 U/L
ALT SERPL-CCNC: 42 U/L
ANION GAP SERPL CALC-SCNC: 3 MMOL/L (ref 0–18)
AST SERPL-CCNC: 46 U/L (ref 15–37)
BILIRUB SERPL-MCNC: 1.1 MG/DL (ref 0.1–2)
BUN BLD-MCNC: 21 MG/DL (ref 7–18)
BUN/CREAT SERPL: 23.9 (ref 10–20)
CALCIUM BLD-MCNC: 9.8 MG/DL (ref 8.5–10.1)
CHLORIDE SERPL-SCNC: 111 MMOL/L (ref 98–112)
CHOLEST SMN-MCNC: 165 MG/DL (ref ?–200)
CO2 SERPL-SCNC: 29 MMOL/L (ref 21–32)
CREAT BLD-MCNC: 0.88 MG/DL
CREAT UR-SCNC: 132 MG/DL
EST. AVERAGE GLUCOSE BLD GHB EST-MCNC: 128 MG/DL (ref 68–126)
GLOBULIN PLAS-MCNC: 3.5 G/DL (ref 2.8–4.4)
GLUCOSE BLD-MCNC: 103 MG/DL (ref 70–99)
HBA1C MFR BLD HPLC: 6.1 % (ref ?–5.7)
HDLC SERPL-MCNC: 57 MG/DL (ref 40–59)
LDLC SERPL CALC-MCNC: 78 MG/DL (ref ?–100)
M PROTEIN MFR SERPL ELPH: 7.2 G/DL (ref 6.4–8.2)
MICROALBUMIN UR-MCNC: 6.92 MG/DL
MICROALBUMIN/CREAT 24H UR-RTO: 52.4 UG/MG (ref ?–30)
NONHDLC SERPL-MCNC: 108 MG/DL (ref ?–130)
OSMOLALITY SERPL CALC.SUM OF ELEC: 299 MOSM/KG (ref 275–295)
PATIENT FASTING Y/N/NP: YES
PATIENT FASTING Y/N/NP: YES
POTASSIUM SERPL-SCNC: 4.9 MMOL/L (ref 3.5–5.1)
SODIUM SERPL-SCNC: 143 MMOL/L (ref 136–145)
TRIGL SERPL-MCNC: 149 MG/DL (ref 30–149)
VLDLC SERPL CALC-MCNC: 30 MG/DL (ref 0–30)

## 2021-01-08 PROCEDURE — 82570 ASSAY OF URINE CREATININE: CPT

## 2021-01-08 PROCEDURE — 80061 LIPID PANEL: CPT

## 2021-01-08 PROCEDURE — 80053 COMPREHEN METABOLIC PANEL: CPT

## 2021-01-08 PROCEDURE — 36415 COLL VENOUS BLD VENIPUNCTURE: CPT

## 2021-01-08 PROCEDURE — 82043 UR ALBUMIN QUANTITATIVE: CPT

## 2021-01-08 PROCEDURE — G0439 PPPS, SUBSEQ VISIT: HCPCS | Performed by: INTERNAL MEDICINE

## 2021-01-08 PROCEDURE — 83036 HEMOGLOBIN GLYCOSYLATED A1C: CPT

## 2021-01-08 NOTE — PROGRESS NOTES
HPI:   Abrahan Gonsales is a 82year old female who presents for a Medicare Subsequent Annual Wellness visit (Pt already had Initial Annual Wellness).       Her last annual assessment has been over 1 year: Annual Physical due on 03/26/2020         Fall/Risk [facility]  Kinga Gutierrez MD ([id number])  Nam Lo (UROLOGY)  Cherri Edwards MD ([facility])  Ne Mendoza (SURGERY, ORTHOPEDIC)  Andrew Rivera (SURGERY, ORTHOPEDIC)  Tamar Lozano ([address] [place] [address])  Claudy Larson, [address] [place] [address] as [facility] Care Manager Capsule Delayed Release, Take 1 capsule (20 mg total) by mouth every morning before breakfast.    •  EZETIMIBE 10 MG Oral Tab, TAKE 1 TABLET BY MOUTH  NIGHTLY    •  GLIPIZIDE ER 5 MG Oral Tablet 24 Hr, TAKE 1 TABLET BY MOUTH  EVERY DAY IN THE MORNING  WITH artery, Carotid stenosis, Carpal tunnel syndrome, right (5/21/2016), Diabetes ([facility] [address] Esophageal reflux, Lumbar spinal stenosis, Obesity, Osteoarthritis, Other and unspecified hyperlipidemia, Rheumatoid arthritis ([facility] [address] Status post carotid endarterectomy (4 for AWV/SWV)    Hearing Screening    Screening Method: Whisper Test  Whisper Test Result: Pass             Visual Acuity                           General Appearance:  Alert, cooperative, no distress, appears stated age   Head:  Normocephalic, without obvi current with her flu vaccines as well as pneumococcal vaccines. At this point given her advanced age, not interested in taking mammograms.   She is also had colonoscopies done before.    (E11.9) Controlled type 2 diabetes mellitus without complication, wit patient.    (F41.1) Generalized anxiety disorder  Plan: Patient takes his Xanax as needed and it has been stable. CPM.    (Z91.81) Risk for falls  Plan: Fall precautions.       PLAN SUMMARY:        Diet assessment: good     PLAN:  The patient indicates und Last Dilated Eye Exam 1/9/2018       Bone Density Screening      Dexascan Every two years No results found for this or any previous visit. No flowsheet data found.     Pap and Pelvic      Pap: Every 3 yrs age 21-65 or Pap+HPV every 5 yrs age 33-67, age 72 4. 9     POTASSIUM (P) (mmol/L)   Date Value   09/27/2011 5.0    No flowsheet data found. Creatinine  Annually Creatinine (mg/dL)   Date Value   01/08/2021 0.88    No flowsheet data found.     Drug Serum Conc  Annually No results found for: DIGOXIN, DIG,

## 2021-01-10 DIAGNOSIS — K21.9 GASTROESOPHAGEAL REFLUX DISEASE, UNSPECIFIED WHETHER ESOPHAGITIS PRESENT: ICD-10-CM

## 2021-01-10 PROBLEM — Z91.81 RISK FOR FALLS: Status: ACTIVE | Noted: 2019-03-27

## 2021-01-10 PROBLEM — M21.622 TAILOR'S BUNION OF BOTH FEET: Status: RESOLVED | Noted: 2020-06-30 | Resolved: 2021-01-10

## 2021-01-10 PROBLEM — I21.4 ACUTE SUBENDOCARDIAL INFARCTION, SUBSEQUENT EPISODE OF CARE (HCC): Status: RESOLVED | Noted: 2020-11-25 | Resolved: 2021-01-10

## 2021-01-10 PROBLEM — M21.621 TAILOR'S BUNION OF BOTH FEET: Status: RESOLVED | Noted: 2020-06-30 | Resolved: 2021-01-10

## 2021-01-10 PROBLEM — E11.40 DIABETIC NEUROPATHY (HCC): Status: RESOLVED | Noted: 2020-06-30 | Resolved: 2021-01-10

## 2021-01-10 PROBLEM — R51.9 NONINTRACTABLE HEADACHE: Status: ACTIVE | Noted: 2021-01-10

## 2021-01-11 RX ORDER — OMEPRAZOLE 20 MG/1
CAPSULE, DELAYED RELEASE ORAL
Qty: 60 CAPSULE | Refills: 0 | Status: SHIPPED | OUTPATIENT
Start: 2021-01-11 | End: 2021-02-06

## 2021-01-12 ENCOUNTER — PATIENT OUTREACH (OUTPATIENT)
Dept: CASE MANAGEMENT | Age: 86
End: 2021-01-12

## 2021-01-12 DIAGNOSIS — Z91.81 RISK FOR FALLS: ICD-10-CM

## 2021-01-12 DIAGNOSIS — E78.2 MIXED HYPERLIPIDEMIA: ICD-10-CM

## 2021-01-12 DIAGNOSIS — F41.1 GENERALIZED ANXIETY DISORDER: ICD-10-CM

## 2021-01-12 DIAGNOSIS — I10 ESSENTIAL HYPERTENSION: ICD-10-CM

## 2021-01-12 DIAGNOSIS — E11.9 CONTROLLED TYPE 2 DIABETES MELLITUS WITHOUT COMPLICATION, WITHOUT LONG-TERM CURRENT USE OF INSULIN (HCC): ICD-10-CM

## 2021-01-12 RX ORDER — ALPRAZOLAM 0.25 MG/1
TABLET ORAL
Qty: 30 TABLET | Refills: 0 | Status: SHIPPED | OUTPATIENT
Start: 2021-01-12 | End: 2021-02-20

## 2021-01-12 NOTE — PROGRESS NOTES
1/12/2021  Spoke to [facility] for CCM. Updates to patient care team/ comments: UTD  Patient reported changes in medications: None  Med Adherence  Comment: Taking as directed    Health Maintenance: Reviewed with patient.    Zoster Vaccines(1 of 2) due on 0 and stay healthy and out of ED by taking care of my self.     • Patient reported progress toward goal: Ha1c was stable      • Patient Reported New Barriers And Concerns:  Ha1c went up                   - Plan for overcoming all barriers:  She will cut back

## 2021-01-29 ENCOUNTER — OFFICE VISIT (OUTPATIENT)
Dept: INTERNAL MEDICINE CLINIC | Facility: CLINIC | Age: 86
End: 2021-01-29
Payer: MEDICARE

## 2021-01-29 VITALS
HEIGHT: 61 IN | TEMPERATURE: 98 F | SYSTOLIC BLOOD PRESSURE: 134 MMHG | BODY MASS INDEX: 38.51 KG/M2 | HEART RATE: 82 BPM | WEIGHT: 204 LBS | DIASTOLIC BLOOD PRESSURE: 80 MMHG | RESPIRATION RATE: 12 BRPM

## 2021-01-29 DIAGNOSIS — I10 ESSENTIAL HYPERTENSION: ICD-10-CM

## 2021-01-29 DIAGNOSIS — E78.2 MIXED HYPERLIPIDEMIA: ICD-10-CM

## 2021-01-29 DIAGNOSIS — Z02.4 DRIVER'S PERMIT PHYSICAL EXAMINATION: Primary | ICD-10-CM

## 2021-01-29 DIAGNOSIS — Z23 NEED FOR VACCINATION: ICD-10-CM

## 2021-01-29 DIAGNOSIS — R60.0 EDEMA OF LEFT UPPER ARM: ICD-10-CM

## 2021-01-29 DIAGNOSIS — E11.9 CONTROLLED TYPE 2 DIABETES MELLITUS WITHOUT COMPLICATION, WITHOUT LONG-TERM CURRENT USE OF INSULIN (HCC): ICD-10-CM

## 2021-01-29 PROCEDURE — 99213 OFFICE O/P EST LOW 20 MIN: CPT | Performed by: INTERNAL MEDICINE

## 2021-01-30 PROBLEM — R60.0 EDEMA OF LEFT UPPER ARM: Status: ACTIVE | Noted: 2021-01-30

## 2021-01-30 NOTE — PROGRESS NOTES
HPI:    Patient ID: Alli Ledezma is a 82year old female. Patient comes in today to have also her DMV or a 's permit form be filled out.   Received her form from the office of the  for her driving privileges in view of her medi VITAMINS) Oral Cap Take 1 capsule by mouth daily. • Cranberry 125 MG Oral Tab Take 1 tablet by mouth daily. • folic acid 1 MG Oral Tab Take 1 tablet (1 mg total) by mouth daily. 90 tablet 0   • aspirin 81 MG Oral Tab Take 1 tablet by mouth daily. Coughing    HISTORY:  Past Medical History:   Diagnosis Date   • Acute subendocardial infarction, subsequent episode of care ([facility] [address] 11/25/2020   • Acute, but ill-defined, cerebrovascular disease     had stroke left frontal, parietal, occipital   • Anxiety Musculoskeletal:         General: Swelling present. No edema. Neurological: She is alert. Skin: She is not diaphoretic. No pallor.             ASSESSMENT/PLAN:   (Z02.4) 's permit physical examination  (primary encounter diagnosis)  Plan: DMV fo

## 2021-01-31 PROCEDURE — 99490 CHRNC CARE MGMT STAFF 1ST 20: CPT

## 2021-02-06 DIAGNOSIS — K21.9 GASTROESOPHAGEAL REFLUX DISEASE, UNSPECIFIED WHETHER ESOPHAGITIS PRESENT: ICD-10-CM

## 2021-02-06 RX ORDER — OMEPRAZOLE 20 MG/1
CAPSULE, DELAYED RELEASE ORAL
Qty: 30 CAPSULE | Refills: 1 | Status: SHIPPED | OUTPATIENT
Start: 2021-02-06 | End: 2021-03-06

## 2021-02-12 ENCOUNTER — PATIENT OUTREACH (OUTPATIENT)
Dept: CASE MANAGEMENT | Age: 86
End: 2021-02-12

## 2021-02-12 DIAGNOSIS — I10 ESSENTIAL HYPERTENSION: ICD-10-CM

## 2021-02-12 DIAGNOSIS — E11.9 CONTROLLED TYPE 2 DIABETES MELLITUS WITHOUT COMPLICATION, WITHOUT LONG-TERM CURRENT USE OF INSULIN (HCC): ICD-10-CM

## 2021-02-12 DIAGNOSIS — E78.2 MIXED HYPERLIPIDEMIA: ICD-10-CM

## 2021-02-12 DIAGNOSIS — I25.10 CORONARY ARTERIOSCLEROSIS IN NATIVE ARTERY: ICD-10-CM

## 2021-02-12 NOTE — PROGRESS NOTES
2/12/2021  Spoke to [facility] for CCM. Updates to patient care team/ comments: UTD  Patient reported changes in medications: None  Med Adherence  Comment: Taking as directed    Health Maintenance: Reviewed with patient.    Zoster Vaccines(1 of 2) due on 0 interventions: Assisted patient in scheduling COVID 19 vaccine appointment. Spoke to son Yany Knloreto to coordinate transportation with patient verbal consent. Encouraged three balanced meals along with 20-30 minutes of daily exercise and stretching.      Time Spe

## 2021-02-13 ENCOUNTER — TELEPHONE (OUTPATIENT)
Dept: INTERNAL MEDICINE CLINIC | Facility: CLINIC | Age: 86
End: 2021-02-13

## 2021-02-13 DIAGNOSIS — Z20.822 CLOSE EXPOSURE TO 2019 NOVEL CORONAVIRUS: Primary | ICD-10-CM

## 2021-02-13 NOTE — TELEPHONE ENCOUNTER
Pt should be tested covid 19 -first and reschedule  covid 19  Vaccine in 2-3  weeks     Since  might be  infected  - should be in  Quarantine now 10 to 14 days    - Follow  CDC  quidlines -recent close exposure to COVID-19

## 2021-02-13 NOTE — TELEPHONE ENCOUNTER
Please advise     Pt stated she visited a friend at the [place] [address] on Wednesday whom had a CVA, but was informed last night Pt is Positive for Covid - she has no s/s but is scheduled for 1st Covid vaccine tomorrow at 1:50 PM  Asking if ok to receive it   Pt wore Routing refill request to provider for review/approval because:  Patient needs to be seen because:  Due for follow up and PHQ update.      CHETNA MooreN, RN

## 2021-02-14 RX ORDER — GABAPENTIN 300 MG/1
300 CAPSULE ORAL 3 TIMES DAILY
Qty: 270 CAPSULE | Refills: 1 | Status: SHIPPED | OUTPATIENT
Start: 2021-02-14 | End: 2021-07-30

## 2021-02-20 RX ORDER — ALPRAZOLAM 0.25 MG/1
TABLET ORAL
Qty: 30 TABLET | Refills: 0 | Status: SHIPPED | OUTPATIENT
Start: 2021-02-20 | End: 2021-04-05

## 2021-02-22 NOTE — PROGRESS NOTES
Pt called had to cancel her COVID vaccine appointment. Stated she was exposed to a friend with COVID 19. She has waited more than 10 days and is not symptomatic. Pt is calling to schedule vaccine. Advised patient we do not have any vaccine at this time.  As

## 2021-02-23 RX ORDER — EZETIMIBE 10 MG/1
10 TABLET ORAL NIGHTLY
Qty: 90 TABLET | Refills: 1 | Status: SHIPPED | OUTPATIENT
Start: 2021-02-23 | End: 2021-07-30

## 2021-02-23 RX ORDER — GLIPIZIDE 5 MG/1
TABLET, FILM COATED, EXTENDED RELEASE ORAL
Qty: 90 TABLET | Refills: 1 | Status: SHIPPED | OUTPATIENT
Start: 2021-02-23 | End: 2021-07-08

## 2021-02-28 PROCEDURE — 99490 CHRNC CARE MGMT STAFF 1ST 20: CPT

## 2021-03-02 ENCOUNTER — LAB ENCOUNTER (OUTPATIENT)
Dept: LAB | Age: 86
End: 2021-03-02
Attending: INTERNAL MEDICINE
Payer: MEDICARE

## 2021-03-02 ENCOUNTER — TELEPHONE (OUTPATIENT)
Dept: INTERNAL MEDICINE CLINIC | Facility: CLINIC | Age: 86
End: 2021-03-02

## 2021-03-02 ENCOUNTER — PATIENT OUTREACH (OUTPATIENT)
Dept: CASE MANAGEMENT | Age: 86
End: 2021-03-02

## 2021-03-02 DIAGNOSIS — Z20.822 EXPOSURE TO COVID-19 VIRUS: Primary | ICD-10-CM

## 2021-03-02 DIAGNOSIS — Z20.822 EXPOSURE TO COVID-19 VIRUS: ICD-10-CM

## 2021-03-02 NOTE — TELEPHONE ENCOUNTER
Pt had an appointment today with Dr. Austin Bailey, office staff canceled her appointment as patient told the nurse she was exposed to a friend that tested positive for COVID 19 on 2/10/21. Pt stated she self isolated for two weeks, feels okay.  Dr. Austin Bailey will

## 2021-03-02 NOTE — PROGRESS NOTES
Pt called stated she had an appointment today with Dr. Kathy Fernando they canceled her appointment as patient told the nurse she was exposed to a friend who tested positive for COVID 19 on 2/10/21. Pt stated she self isolated for two week, feels okay.  Dr. Michael Bolden

## 2021-03-04 LAB — SARS-COV-2 RNA RESP QL NAA+PROBE: NOT DETECTED

## 2021-03-06 DIAGNOSIS — K21.9 GASTROESOPHAGEAL REFLUX DISEASE, UNSPECIFIED WHETHER ESOPHAGITIS PRESENT: ICD-10-CM

## 2021-03-06 RX ORDER — OMEPRAZOLE 20 MG/1
CAPSULE, DELAYED RELEASE ORAL
Qty: 30 CAPSULE | Refills: 1 | Status: SHIPPED | OUTPATIENT
Start: 2021-03-06 | End: 2021-03-08

## 2021-03-08 DIAGNOSIS — K21.9 GASTROESOPHAGEAL REFLUX DISEASE, UNSPECIFIED WHETHER ESOPHAGITIS PRESENT: ICD-10-CM

## 2021-03-08 NOTE — TELEPHONE ENCOUNTER
OMEPRAZOLE 20 MG Oral Capsule Delayed Release, TAKE 1 CAPSULE BY MOUTH EVERY DAY IN THE MORNING BEFORE BREAKFAST, Disp: 30 capsule, Rfl: 1    Pharmacy note: Requesting 90 day supply

## 2021-03-09 RX ORDER — OMEPRAZOLE 20 MG/1
20 CAPSULE, DELAYED RELEASE ORAL
Qty: 90 CAPSULE | Refills: 1 | Status: SHIPPED | OUTPATIENT
Start: 2021-03-09 | End: 2021-08-05

## 2021-03-12 ENCOUNTER — PATIENT OUTREACH (OUTPATIENT)
Dept: CASE MANAGEMENT | Age: 86
End: 2021-03-12

## 2021-03-12 DIAGNOSIS — F41.1 GENERALIZED ANXIETY DISORDER: ICD-10-CM

## 2021-03-12 DIAGNOSIS — I10 ESSENTIAL HYPERTENSION: ICD-10-CM

## 2021-03-12 DIAGNOSIS — E78.2 MIXED HYPERLIPIDEMIA: ICD-10-CM

## 2021-03-12 DIAGNOSIS — E11.9 CONTROLLED TYPE 2 DIABETES MELLITUS WITHOUT COMPLICATION, WITHOUT LONG-TERM CURRENT USE OF INSULIN (HCC): ICD-10-CM

## 2021-03-12 NOTE — PROGRESS NOTES
3/12/2021  Spoke to [facility] for CCM. Updates to patient care team/ comments: UTD  Patient reported changes in medications: None  Med Adherence  Comment: Taking as directed    Health Maintenance:  Reviewed with patient.    Zoster Vaccines(1 of 2) due on action plan. yes  Self-Management Abilities (patient reported)             1= least confident in achieving goal, 5= very confident               - confidence: 4  Care Manager Follow Up:  One month    Reason For Follow Up: review progress and or barriers tow

## 2021-03-17 ENCOUNTER — OFFICE VISIT (OUTPATIENT)
Dept: INTERNAL MEDICINE CLINIC | Facility: CLINIC | Age: 86
End: 2021-03-17
Payer: MEDICARE

## 2021-03-17 VITALS
TEMPERATURE: 97 F | SYSTOLIC BLOOD PRESSURE: 126 MMHG | HEIGHT: 61 IN | DIASTOLIC BLOOD PRESSURE: 70 MMHG | HEART RATE: 80 BPM | WEIGHT: 102 LBS | BODY MASS INDEX: 19.26 KG/M2

## 2021-03-17 DIAGNOSIS — E11.9 CONTROLLED TYPE 2 DIABETES MELLITUS WITHOUT COMPLICATION, WITHOUT LONG-TERM CURRENT USE OF INSULIN (HCC): ICD-10-CM

## 2021-03-17 DIAGNOSIS — M06.9 RHEUMATOID ARTHRITIS INVOLVING MULTIPLE SITES, UNSPECIFIED WHETHER RHEUMATOID FACTOR PRESENT (HCC): ICD-10-CM

## 2021-03-17 DIAGNOSIS — E78.2 MIXED HYPERLIPIDEMIA: ICD-10-CM

## 2021-03-17 DIAGNOSIS — I10 ESSENTIAL HYPERTENSION: ICD-10-CM

## 2021-03-17 DIAGNOSIS — J18.9 COMMUNITY ACQUIRED PNEUMONIA, UNSPECIFIED LATERALITY: Primary | ICD-10-CM

## 2021-03-17 PROCEDURE — 99214 OFFICE O/P EST MOD 30 MIN: CPT | Performed by: INTERNAL MEDICINE

## 2021-03-17 RX ORDER — FUROSEMIDE 20 MG/1
20 TABLET ORAL DAILY PRN
COMMUNITY
Start: 2021-03-07

## 2021-03-17 RX ORDER — AMLODIPINE BESYLATE 5 MG/1
TABLET ORAL
COMMUNITY
Start: 2020-09-09

## 2021-03-17 RX ORDER — METOPROLOL SUCCINATE 50 MG/1
TABLET, EXTENDED RELEASE ORAL
COMMUNITY
Start: 2020-05-26

## 2021-03-17 RX ORDER — HYDROCORTISONE 25 MG/G
CREAM TOPICAL
COMMUNITY
Start: 2020-12-01 | End: 2021-10-13 | Stop reason: ALTCHOICE

## 2021-03-17 RX ORDER — CEFDINIR 300 MG/1
300 CAPSULE ORAL 2 TIMES DAILY
COMMUNITY
Start: 2021-03-07 | End: 2021-10-13 | Stop reason: ALTCHOICE

## 2021-03-19 NOTE — PROGRESS NOTES
HPI/Subjective:     Patient ID: Felix Mendez is a 82year old female. Pneumonia  She complains of cough and shortness of breath. This is a new problem. The current episode started 1 to 4 weeks ago. The problem occurs constantly.  The problem has been capsule 0   • Nitrofurantoin Monohyd Macro 100 MG Oral Cap Take 1 capsule (100 mg total) by mouth 2 (two) times daily. 14 capsule 0   • Acetaminophen-Codeine #3 300-30 MG Oral Tab Take 1 tablet by mouth every 8 (eight) hours as needed.      • Albuterol Sulf Atherosclerosis of coronary artery    • Carotid stenosis    • Carpal tunnel syndrome, right 5/21/2016   • Diabetes ([facility] [address]     as per NG   • Esophageal reflux     as per NG   • Lumbar spinal stenosis    • Obesity    • Osteoarthritis    • Other and unspecifie breath sounds. No wheezing or rales. Abdominal:      General: Bowel sounds are normal. There is no distension. Palpations: Abdomen is soft. There is no mass. Tenderness: There is no abdominal tenderness. There is no guarding.    Musculoskeletal: (CPT=[id number])

## 2021-03-31 PROCEDURE — 99490 CHRNC CARE MGMT STAFF 1ST 20: CPT

## 2021-04-05 RX ORDER — ALPRAZOLAM 0.25 MG/1
TABLET ORAL
Qty: 30 TABLET | Refills: 0 | Status: SHIPPED | OUTPATIENT
Start: 2021-04-05 | End: 2021-05-21

## 2021-04-08 ENCOUNTER — TELEPHONE (OUTPATIENT)
Dept: INTERNAL MEDICINE CLINIC | Facility: CLINIC | Age: 86
End: 2021-04-08

## 2021-04-08 NOTE — TELEPHONE ENCOUNTER
Verified pt name and . Reviewed doctor's recommendations as noted below with pt, pt states she has no fever and will call back if she has any symptoms prior to receiving 2nd Covid vaccine.

## 2021-04-08 NOTE — TELEPHONE ENCOUNTER
Called patient to ask if she was diagnosed with covid at her [facility] ER visit on 4/3. Patient stated that she was tested was covid and the result was negative.  Her 2nd covid vaccine is scheduled for 4/12/21

## 2021-04-08 NOTE — TELEPHONE ENCOUNTER
Patient went to [facility] ER on a 4/3 for a lung problem and was prescribed levofloxacin 500mg. Patient has an appointment scheduled for the second dose of the pfizer COVID vaccine on 412.  Patient is not sure if she should keep the appointment or if she needs

## 2021-04-08 NOTE — TELEPHONE ENCOUNTER
Ok then. She should be done with her abx by the time she will get her 2nd dose of her covid vaccine. As long as she is feeling well, no fevers, then she may still get the vaccine as scheduled.  thankls

## 2021-04-14 ENCOUNTER — OFFICE VISIT (OUTPATIENT)
Dept: INTERNAL MEDICINE CLINIC | Facility: CLINIC | Age: 86
End: 2021-04-14
Payer: MEDICARE

## 2021-04-14 VITALS
HEIGHT: 61 IN | SYSTOLIC BLOOD PRESSURE: 126 MMHG | TEMPERATURE: 99 F | HEART RATE: 83 BPM | WEIGHT: 201 LBS | BODY MASS INDEX: 37.95 KG/M2 | DIASTOLIC BLOOD PRESSURE: 70 MMHG

## 2021-04-14 DIAGNOSIS — R07.81 RIB PAIN ON RIGHT SIDE: Primary | ICD-10-CM

## 2021-04-14 PROCEDURE — 99214 OFFICE O/P EST MOD 30 MIN: CPT | Performed by: INTERNAL MEDICINE

## 2021-04-14 RX ORDER — LEVOFLOXACIN 500 MG/1
500 TABLET, FILM COATED ORAL DAILY
COMMUNITY
Start: 2021-04-03 | End: 2021-04-25

## 2021-04-14 RX ORDER — HYDROCODONE BITARTRATE AND ACETAMINOPHEN 5; 325 MG/1; MG/1
1 TABLET ORAL EVERY 8 HOURS PRN
Qty: 15 TABLET | Refills: 0 | Status: SHIPPED | OUTPATIENT
Start: 2021-04-14 | End: 2021-04-25

## 2021-04-14 NOTE — PROGRESS NOTES
HPI/Subjective:     Patient ID: Hetal Solano is a 82year old female. Pain  This is a new (right lower posterior rib cage) problem. The current episode started 1 to 4 weeks ago (11 days ago). The problem occurs constantly.  The problem has been waxing Therapy Pack TAKE BY MOUTH AS DIRECTED PER PACKAGE INSTRUCTIONS     • cephALEXin 500 MG Oral Cap Take 1 capsule (500 mg total) by mouth 2 (two) times daily.  14 capsule 0   • Nitrofurantoin Monohyd Macro 100 MG Oral Cap Take 1 capsule (100 mg total) by mout infarction, subsequent episode of care [facility]) 11/25/2020   • Acute, but ill-defined, cerebrovascular disease     had stroke left frontal, parietal, occipital   • Anxiety    • Atherosclerosis of coronary artery    • Carotid stenosis    • Carpal tunnel syndro present. Pulses: Normal pulses. Heart sounds: Normal heart sounds. No murmur heard. Pulmonary:      Effort: Pulmonary effort is normal. No respiratory distress. Breath sounds: Normal breath sounds and air entry. No wheezing or rales.

## 2021-04-19 ENCOUNTER — TELEPHONE (OUTPATIENT)
Dept: INTERNAL MEDICINE CLINIC | Facility: CLINIC | Age: 86
End: 2021-04-19

## 2021-04-19 ENCOUNTER — PATIENT OUTREACH (OUTPATIENT)
Dept: CASE MANAGEMENT | Age: 86
End: 2021-04-19

## 2021-04-19 DIAGNOSIS — E11.9 CONTROLLED TYPE 2 DIABETES MELLITUS WITHOUT COMPLICATION, WITHOUT LONG-TERM CURRENT USE OF INSULIN (HCC): ICD-10-CM

## 2021-04-19 DIAGNOSIS — E78.2 MIXED HYPERLIPIDEMIA: ICD-10-CM

## 2021-04-19 DIAGNOSIS — F41.1 GENERALIZED ANXIETY DISORDER: ICD-10-CM

## 2021-04-19 DIAGNOSIS — Z91.81 RISK FOR FALLS: ICD-10-CM

## 2021-04-19 DIAGNOSIS — I10 ESSENTIAL HYPERTENSION: ICD-10-CM

## 2021-04-19 NOTE — TELEPHONE ENCOUNTER
Contacted patient to follow up on CCM. Stated she is not feeling well. Pt reports Friday afternoon she started with diarrhea, weakness and a mild fever. Pt is not sure what her temp is but stated she knows when she has one.   Daughter is checking in on

## 2021-04-19 NOTE — TELEPHONE ENCOUNTER
She just got done with antibiotic levaquin given by ER last week. She is now having diarrhea, weakness and fever. She may have c dif colitis that can be cause by abx. . She is immunocompromised and given her age also, would refer her to ER for evaluation

## 2021-04-19 NOTE — PROGRESS NOTES
4/19/2021  Spoke to [employer] for CCM. Updates to patient care team/ comments: UTD  Patient reported changes in medications: None  Med Adherence  Comment: Taking as directed    Health Maintenance:  Reviewed with patient. Encouraged updating.    Zoster Vacc to PCP. Encouraged her to keep sipping on Pedialyte, eating small frequent meals discussed brat diet.      Time Spent This Encounter Total: 22 min medical record review, telephone communication, care plan updates where needed, education, goals and action pl

## 2021-04-19 NOTE — TELEPHONE ENCOUNTER
Spoke with pt and MD message below given. Pt verb understanding    Pt agrees to plan.   Pt states she will go to [facility], prefers not to go to [facility] ER

## 2021-04-21 NOTE — PROGRESS NOTES
Patient called with an update. States she went to ED as triage advised, she was sent home several hours later with an order for C-diff. Stated the following day she felt fine has not had diarrhea since.  Yesterday she noticed she lost her sense of smell and

## 2021-04-25 ENCOUNTER — HOSPITAL ENCOUNTER (OUTPATIENT)
Age: 86
Discharge: ANOTHER HOSPITAL | End: 2021-04-25
Payer: MEDICARE

## 2021-04-25 VITALS
DIASTOLIC BLOOD PRESSURE: 43 MMHG | HEART RATE: 84 BPM | HEIGHT: 61 IN | SYSTOLIC BLOOD PRESSURE: 134 MMHG | RESPIRATION RATE: 18 BRPM | BODY MASS INDEX: 35.87 KG/M2 | WEIGHT: 190 LBS | OXYGEN SATURATION: 95 % | TEMPERATURE: 102 F

## 2021-04-25 DIAGNOSIS — R10.9 ABDOMINAL PAIN, ACUTE: Primary | ICD-10-CM

## 2021-04-25 PROCEDURE — 99213 OFFICE O/P EST LOW 20 MIN: CPT | Performed by: NURSE PRACTITIONER

## 2021-04-25 NOTE — ED PROVIDER NOTES
Patient Seen in: [facility]      History   Patient presents with:  Diarrhea    Stated Complaint: CIDEF - FEVER GASSY X 4 DAYS    HPI/Subjective:   HPI    This is an 57-year-old female who presents with a chief complaint of rectal pain, fever, CHOLECYSTECTOMY     • COLONOSCOPY     • ELECTROCARDIOGRAM, COMPLETE  03-    SCANNED TO MEDIA TAB- 03-   • KNEE REPLACEMENT SURGERY                  Social History    Tobacco Use      Smoking status: Former Smoker      Smokeless tobacco: Never rhythm. Pulses: Normal pulses. Heart sounds: Normal heart sounds. Pulmonary:      Effort: Pulmonary effort is normal.      Breath sounds: Normal breath sounds. Abdominal:      General: There is no distension. Palpations: Abdomen is soft.

## 2021-04-25 NOTE — ED INITIAL ASSESSMENT (HPI)
Pt states she was in the hospital for pneumonia and treated with levaquin a month ago. +diarrhea for 4 days. +fever.

## 2021-04-26 ENCOUNTER — VIRTUAL PHONE E/M (OUTPATIENT)
Dept: INTERNAL MEDICINE CLINIC | Facility: CLINIC | Age: 86
End: 2021-04-26
Payer: MEDICARE

## 2021-04-26 ENCOUNTER — TELEPHONE (OUTPATIENT)
Dept: INTERNAL MEDICINE CLINIC | Facility: CLINIC | Age: 86
End: 2021-04-26

## 2021-04-26 DIAGNOSIS — U07.1 COVID-19 VIRUS INFECTION: Primary | ICD-10-CM

## 2021-04-26 PROCEDURE — 99442 PHONE E/M BY PHYS 11-20 MIN: CPT | Performed by: INTERNAL MEDICINE

## 2021-04-26 NOTE — TELEPHONE ENCOUNTER
Patient contacted. Yesterday patient didn't feel good; felt weak. So she went to ER. Patient feels ok, but notices her appetite is not as good. Feels feverish. She didn't understand why she got ill when she had both vaccines.      I scheduled patient a

## 2021-04-26 NOTE — PROGRESS NOTES
HPI/Subjective:     Patient ID: Vidal Galvan is a 82year old female. Patient presents today thru telephone visit for post ER ffup. She went to [facility] ER yesterday after having fevers, abdominal pain/discomfort and diarrhea.  She had not been feeling we Take 1 tablet (10 mg total) by mouth nightly. 90 tablet 1   • gabapentin 300 MG Oral Cap Take 1 capsule (300 mg total) by mouth 3 (three) times daily.  270 capsule 1   • Albuterol Sulfate HFA (PROAIR HFA) 108 (90 Base) MCG/ACT Inhalation Aero Soln Inhale 2 Obesity    • Osteoarthritis    • Other and unspecified hyperlipidemia     as per NG   • Rheumatoid arthritis ([facility]    • Status post carotid endarterectomy 4/13/2015   • Type II or unspecified type diabetes mellitus without mention of complication, not stat back or go to ER. Pt agreed. Please note that the following visit was completed using two-way, real-time interactive audio and/or video communication.   This has been done in good zane to provide continuity of care in the best interest of the provi

## 2021-04-26 NOTE — TELEPHONE ENCOUNTER
Incoming call from [facility] nurse. Courtesy f/u call. RN informed me patient was in ER 4/25/21 and tested positive for covid 19. Had diarrhea. Patient has no sob. Patient wasn't feeling well.

## 2021-04-26 NOTE — TELEPHONE ENCOUNTER
Pt called wanting to inform Dr. Augustine Mcgarry she was recently confirmed with having Covid, even after having both pfizer shots. Wanted to speak with doctor about her condition.

## 2021-04-27 NOTE — TELEPHONE ENCOUNTER
Patient was in ER 4/25/21 at [facility]. Day #2/10    Left message to call back on patient's voicemail.

## 2021-04-28 NOTE — TELEPHONE ENCOUNTER
What  was your temp today? - didn't take temp today, but I don't feel like I have one. How did you take your temp?     with an oral thermometer    What was your pulse ox today? Don't have one. Are you feeling short of breath today?    No      Is the breath); and  ? At least 10 days have passed since symptoms first appeared OR if asymptomatic patient or date of symptom onset is unclear then use 10 days post COVID test date. Patient requested to be called every other day. Per patient, she feels ok.

## 2021-04-30 PROCEDURE — 99439 CHRNC CARE MGMT STAF EA ADDL: CPT

## 2021-04-30 PROCEDURE — 99490 CHRNC CARE MGMT STAFF 1ST 20: CPT

## 2021-04-30 NOTE — TELEPHONE ENCOUNTER
What  was your temp today? - didn't take    How did you take your temp?  oral    What was your pulse ox today? Don't have one     Are you feeling short of breath today? Yes    Is the shortness of breath better, the same, or worse than yesterday?        A

## 2021-05-03 ENCOUNTER — PATIENT OUTREACH (OUTPATIENT)
Dept: CASE MANAGEMENT | Age: 86
End: 2021-05-03

## 2021-05-03 DIAGNOSIS — I25.10 CORONARY ARTERIOSCLEROSIS IN NATIVE ARTERY: ICD-10-CM

## 2021-05-03 DIAGNOSIS — Z91.81 RISK FOR FALLS: ICD-10-CM

## 2021-05-03 DIAGNOSIS — E11.9 CONTROLLED TYPE 2 DIABETES MELLITUS WITHOUT COMPLICATION, WITHOUT LONG-TERM CURRENT USE OF INSULIN (HCC): ICD-10-CM

## 2021-05-03 DIAGNOSIS — I10 ESSENTIAL HYPERTENSION: ICD-10-CM

## 2021-05-03 NOTE — PROGRESS NOTES
5/3/2021  Spoke to [employer] for CCM. Updates to patient care team/ comments: UTD  Patient reported changes in medications: None  Med Adherence  Comment: Taking as directed    Health Maintenance:  Discussed with patient. Encouraged updating.    Zoster Vac risk precautions, when working in her garden. Time Spent This Encounter Total:20 min medical record review, telephone communication, care plan updates where needed, education, goals and action plan recreation/update.  Provided acknowledgment and validat

## 2021-05-21 RX ORDER — ALPRAZOLAM 0.25 MG/1
TABLET ORAL
Qty: 30 TABLET | Refills: 0 | Status: SHIPPED | OUTPATIENT
Start: 2021-05-21 | End: 2021-07-02

## 2021-05-31 PROCEDURE — 99490 CHRNC CARE MGMT STAFF 1ST 20: CPT

## 2021-06-03 ENCOUNTER — HOSPITAL ENCOUNTER (OUTPATIENT)
Dept: GENERAL RADIOLOGY | Age: 86
Discharge: HOME | End: 2021-06-03
Attending: INTERNAL MEDICINE
Payer: MEDICARE

## 2021-06-03 ENCOUNTER — OFFICE VISIT (OUTPATIENT)
Dept: INTERNAL MEDICINE CLINIC | Facility: CLINIC | Age: 86
End: 2021-06-03
Payer: MEDICARE

## 2021-06-03 ENCOUNTER — NURSE TRIAGE (OUTPATIENT)
Dept: INTERNAL MEDICINE CLINIC | Facility: CLINIC | Age: 86
End: 2021-06-03

## 2021-06-03 VITALS
HEIGHT: 61 IN | OXYGEN SATURATION: 98 % | DIASTOLIC BLOOD PRESSURE: 68 MMHG | BODY MASS INDEX: 37.76 KG/M2 | HEART RATE: 90 BPM | WEIGHT: 200 LBS | SYSTOLIC BLOOD PRESSURE: 134 MMHG

## 2021-06-03 DIAGNOSIS — R05.9 COUGH: Primary | ICD-10-CM

## 2021-06-03 DIAGNOSIS — R05.9 COUGH: ICD-10-CM

## 2021-06-03 DIAGNOSIS — H92.01 RIGHT EAR PAIN: ICD-10-CM

## 2021-06-03 PROCEDURE — 99214 OFFICE O/P EST MOD 30 MIN: CPT | Performed by: INTERNAL MEDICINE

## 2021-06-03 PROCEDURE — 71046 X-RAY EXAM CHEST 2 VIEWS: CPT | Performed by: INTERNAL MEDICINE

## 2021-06-03 RX ORDER — AMOXICILLIN 500 MG/1
500 TABLET, FILM COATED ORAL 3 TIMES DAILY
Qty: 21 TABLET | Refills: 0 | Status: SHIPPED | OUTPATIENT
Start: 2021-06-03 | End: 2021-10-13 | Stop reason: ALTCHOICE

## 2021-06-03 NOTE — TELEPHONE ENCOUNTER
Action Requested: Summary for Provider     []  Critical Lab, Recommendations Needed  [] Need Additional Advice  []   FYI    []   Need Orders  [] Need Medications Sent to Pharmacy  []  Other     SUMMARY:Pt requesting appt- s/s x 1 week ,sneezing productiv

## 2021-06-03 NOTE — PROGRESS NOTES
HPI/Subjective:     Patient ID: Hetal Solano is a 82year old female. Cough  This is a new problem. The problem has been unchanged. The problem occurs constantly. The cough is productive of sputum.  Associated symptoms include ear congestion, ear pain MG-UNIT Oral Tab Take 1 tablet by mouth daily. • methotrexate (RHEUMATREX) 2.5 MG Oral Tab Take 2.5 mg by mouth. TAKE 6 TAB ONCE A WEEK. PATIENT TAKING EVERY Thursday. • Red Yeast Rice 600 MG Oral Cap Take 2 tablets by mouth daily.      • Hydrocort Surgical History:   Procedure Laterality Date   • ANGIOPLASTY (CORONARY)      Angioplasty with Stent; as per NG   • CHOLECYSTECTOMY     • COLONOSCOPY     • ELECTROCARDIOGRAM, COMPLETE  03-    SCANNED TO MEDIA TAB- 03-   • KNEE REPLACEMENT MICH above.         No orders of the defined types were placed in this encounter.       Meds This Visit:  Requested Prescriptions      No prescriptions requested or ordered in this encounter       Imaging & Referrals:  None

## 2021-06-16 ENCOUNTER — PATIENT OUTREACH (OUTPATIENT)
Dept: CASE MANAGEMENT | Age: 86
End: 2021-06-16

## 2021-06-16 DIAGNOSIS — E78.2 MIXED HYPERLIPIDEMIA: ICD-10-CM

## 2021-06-16 DIAGNOSIS — I10 ESSENTIAL HYPERTENSION: ICD-10-CM

## 2021-06-16 DIAGNOSIS — F41.1 GENERALIZED ANXIETY DISORDER: ICD-10-CM

## 2021-06-16 DIAGNOSIS — M06.9 RHEUMATOID ARTHRITIS INVOLVING MULTIPLE SITES, UNSPECIFIED WHETHER RHEUMATOID FACTOR PRESENT (HCC): ICD-10-CM

## 2021-06-16 NOTE — PROGRESS NOTES
6/16/2021  Spoke to [employer] for CCM. Updates to patient care team/ comments: UTD  Patient reported changes in medications: None  Med Adherence  Comment: Taking as directed    Health Maintenance:  UTD, reviewed with patient.    DEXA Scan Never done  Diab and or barriers towards patients goals. Care managers interventions: Encouraged she eat healthy balanced meals, stretch daily.      Time Spent This Encounter Total:  21 min medical record review, telephone communication, care plan updates where needed,

## 2021-06-30 PROCEDURE — 99490 CHRNC CARE MGMT STAFF 1ST 20: CPT

## 2021-07-02 RX ORDER — ALPRAZOLAM 0.25 MG/1
TABLET ORAL
Qty: 30 TABLET | Refills: 0 | Status: SHIPPED | OUTPATIENT
Start: 2021-07-02 | End: 2021-08-07

## 2021-07-06 ENCOUNTER — PATIENT OUTREACH (OUTPATIENT)
Dept: CASE MANAGEMENT | Age: 86
End: 2021-07-06

## 2021-07-06 DIAGNOSIS — E78.2 MIXED HYPERLIPIDEMIA: ICD-10-CM

## 2021-07-06 DIAGNOSIS — F41.1 GENERALIZED ANXIETY DISORDER: ICD-10-CM

## 2021-07-06 DIAGNOSIS — E11.9 CONTROLLED TYPE 2 DIABETES MELLITUS WITHOUT COMPLICATION, WITHOUT LONG-TERM CURRENT USE OF INSULIN (HCC): ICD-10-CM

## 2021-07-06 DIAGNOSIS — I25.10 CORONARY ARTERIOSCLEROSIS IN NATIVE ARTERY: ICD-10-CM

## 2021-07-06 NOTE — PROGRESS NOTES
7/6/2021  Spoke to [employer] for CCM.       Updates to patient care team/ comments: UTD  Patient reported changes in medications: None  Med Adherence  Comment: Taking as directed    Health Maintenance:  Reviewed with patient, Encouraged Dm eye exam.  Zoster Va Patient agrees to goal action plan. yes  Self-Management Abilities (patient reported)             1= least confident in achieving goal, 5= very confident               - confidence: 4    Care Manager Follow Up:  One month    Reason For Follow Up: review

## 2021-07-08 RX ORDER — GLIPIZIDE 5 MG/1
TABLET, FILM COATED, EXTENDED RELEASE ORAL
Qty: 90 TABLET | Refills: 3 | Status: SHIPPED | OUTPATIENT
Start: 2021-07-08 | End: 2021-08-24 | Stop reason: DRUGHIGH

## 2021-07-30 RX ORDER — EZETIMIBE 10 MG/1
10 TABLET ORAL NIGHTLY
Qty: 90 TABLET | Refills: 1 | Status: SHIPPED | OUTPATIENT
Start: 2021-07-30 | End: 2022-01-06

## 2021-07-30 RX ORDER — GABAPENTIN 300 MG/1
300 CAPSULE ORAL 3 TIMES DAILY
Qty: 270 CAPSULE | Refills: 1 | Status: SHIPPED | OUTPATIENT
Start: 2021-07-30 | End: 2022-01-06

## 2021-07-31 PROCEDURE — 99490 CHRNC CARE MGMT STAFF 1ST 20: CPT

## 2021-08-05 DIAGNOSIS — K21.9 GASTROESOPHAGEAL REFLUX DISEASE, UNSPECIFIED WHETHER ESOPHAGITIS PRESENT: ICD-10-CM

## 2021-08-05 RX ORDER — OMEPRAZOLE 20 MG/1
20 CAPSULE, DELAYED RELEASE ORAL
Qty: 90 CAPSULE | Refills: 1 | Status: SHIPPED | OUTPATIENT
Start: 2021-08-05

## 2021-08-06 NOTE — TELEPHONE ENCOUNTER
Refill passed per [facility] protocol.     Requested Prescriptions   Pending Prescriptions Disp Refills    OMEPRAZOLE 20 MG Oral Capsule Delayed Release [Pharmacy Med Name: OMEPRAZOLE DR 20 MG CAPSULE] 90 capsule 1     Sig: TAKE 1 CAPSULE BY MOUTH EVER

## 2021-08-07 ENCOUNTER — TELEPHONE (OUTPATIENT)
Dept: INTERNAL MEDICINE CLINIC | Facility: CLINIC | Age: 86
End: 2021-08-07

## 2021-08-07 RX ORDER — ALPRAZOLAM 0.25 MG/1
TABLET ORAL
Qty: 30 TABLET | Refills: 0 | Status: SHIPPED | OUTPATIENT
Start: 2021-08-07 | End: 2021-09-10

## 2021-08-07 NOTE — TELEPHONE ENCOUNTER
[employer] Prescription/Certificate of Medical Necessity form for incontinence supplies received and placed in Dr. Krys Byrne in basket .  Dr. Cunningham Perish out of the office until 8/16/21

## 2021-08-08 NOTE — TELEPHONE ENCOUNTER
Please review; protocol failed/no protocol.      Requested Prescriptions   Pending Prescriptions Disp Refills    ALPRAZOLAM 0.25 MG Oral Tab [Pharmacy Med Name: ALPRAZOLAM 0.25 MG TABLET] 30 tablet 0     Sig: TAKE 1 TABLET BY MOUTH EVERY DAY AS NEEDED FOR ANXIETY        There is no refill protocol information for this order            Recent Outpatient Visits              2 months ago Cough    [facility], [address], Syl Hernandez MD    Office Visit    3 months ago COVID-19 virus infection    [facility] Chung [facility]    Virtual Phone E/M    3 months ago Rib pain on right side    [facility]    Office Visit    4 months ago Community acquired pneumonia, unspecified laterality    [facility], [address], Syl Hernandez MD    Office Visit    6 months ago 's permit physical examination    [facility], Syl Hernandez MD    Office Visit
No

## 2021-08-13 ENCOUNTER — PATIENT OUTREACH (OUTPATIENT)
Dept: CASE MANAGEMENT | Age: 86
End: 2021-08-13

## 2021-08-13 DIAGNOSIS — I25.10 CORONARY ARTERIOSCLEROSIS IN NATIVE ARTERY: ICD-10-CM

## 2021-08-13 DIAGNOSIS — E11.9 CONTROLLED TYPE 2 DIABETES MELLITUS WITHOUT COMPLICATION, WITHOUT LONG-TERM CURRENT USE OF INSULIN (HCC): ICD-10-CM

## 2021-08-13 DIAGNOSIS — E78.2 MIXED HYPERLIPIDEMIA: ICD-10-CM

## 2021-08-13 DIAGNOSIS — F41.1 GENERALIZED ANXIETY DISORDER: ICD-10-CM

## 2021-08-13 DIAGNOSIS — I10 ESSENTIAL HYPERTENSION: ICD-10-CM

## 2021-08-13 NOTE — PROGRESS NOTES
8/13/2021  Spoke to [employer] for CCM. Updates to patient care team/ comments: UTD  Patient reported changes in medications: None  Med Adherence  Comment: Taking as directed    Health Maintenance: Reviewed with patient.    DEXA Scan Never done  Diabetes C Barriers And Concerns: No new barriers at this time. - Plan for overcoming all barriers: n/a            Patient agrees to goal action plan.  yes  Self-Management Abilities (patient reported)             1= least confident in achieving goal

## 2021-08-18 ENCOUNTER — TELEPHONE (OUTPATIENT)
Dept: INTERNAL MEDICINE CLINIC | Facility: CLINIC | Age: 86
End: 2021-08-18

## 2021-08-18 NOTE — TELEPHONE ENCOUNTER
Order dated 8/17/21 for pull ups, underpads, and gloves received from [employer], placed on Dr Ana Rosa Hillman desalistair for review and signature.

## 2021-08-24 ENCOUNTER — OFFICE VISIT (OUTPATIENT)
Dept: INTERNAL MEDICINE CLINIC | Facility: CLINIC | Age: 86
End: 2021-08-24
Payer: MEDICARE

## 2021-08-24 VITALS
TEMPERATURE: 99 F | SYSTOLIC BLOOD PRESSURE: 122 MMHG | BODY MASS INDEX: 35.33 KG/M2 | HEIGHT: 62 IN | WEIGHT: 192 LBS | DIASTOLIC BLOOD PRESSURE: 60 MMHG | RESPIRATION RATE: 12 BRPM | HEART RATE: 81 BPM

## 2021-08-24 DIAGNOSIS — E11.59 HYPERTENSION ASSOCIATED WITH TYPE 2 DIABETES MELLITUS (HCC): ICD-10-CM

## 2021-08-24 DIAGNOSIS — N39.3 STRESS INCONTINENCE OF URINE: ICD-10-CM

## 2021-08-24 DIAGNOSIS — F41.1 GENERALIZED ANXIETY DISORDER: ICD-10-CM

## 2021-08-24 DIAGNOSIS — E11.69 HYPERLIPIDEMIA ASSOCIATED WITH TYPE 2 DIABETES MELLITUS (HCC): ICD-10-CM

## 2021-08-24 DIAGNOSIS — I15.2 HYPERTENSION ASSOCIATED WITH TYPE 2 DIABETES MELLITUS (HCC): ICD-10-CM

## 2021-08-24 DIAGNOSIS — E11.9 CONTROLLED TYPE 2 DIABETES MELLITUS WITHOUT COMPLICATION, WITHOUT LONG-TERM CURRENT USE OF INSULIN (HCC): Primary | ICD-10-CM

## 2021-08-24 DIAGNOSIS — E78.5 HYPERLIPIDEMIA ASSOCIATED WITH TYPE 2 DIABETES MELLITUS (HCC): ICD-10-CM

## 2021-08-24 DIAGNOSIS — M06.9 RHEUMATOID ARTHRITIS INVOLVING MULTIPLE SITES, UNSPECIFIED WHETHER RHEUMATOID FACTOR PRESENT (HCC): ICD-10-CM

## 2021-08-24 LAB
CARTRIDGE LOT#: ABNORMAL NUMERIC
HEMOGLOBIN A1C: 5.7 % (ref 4.3–5.6)

## 2021-08-24 PROCEDURE — 83036 HEMOGLOBIN GLYCOSYLATED A1C: CPT | Performed by: INTERNAL MEDICINE

## 2021-08-24 PROCEDURE — 99214 OFFICE O/P EST MOD 30 MIN: CPT | Performed by: INTERNAL MEDICINE

## 2021-08-24 RX ORDER — GLIPIZIDE 2.5 MG/1
2.5 TABLET, EXTENDED RELEASE ORAL
Qty: 90 TABLET | Refills: 0 | Status: SHIPPED | OUTPATIENT
Start: 2021-08-24 | End: 2021-10-07

## 2021-08-24 NOTE — TELEPHONE ENCOUNTER
Order dated 8/17/21 for pull ups, underpads, and gloves signed and faxed to [facility] at [phone], confirmation received.

## 2021-08-26 NOTE — PROGRESS NOTES
HPI/Subjective:     Patient ID: Jennifer Leiva is a 82year old female. Medication Follow-Up  Pertinent negatives include no chest pain. Diabetes  She presents for her follow-up diabetic visit. She has type 2 diabetes mellitus.  Her disease course has CAD/MI and CVA. There is no history of angina, heart failure, PVD or retinopathy. There is no history of chronic renal disease or a hypertension causing med. Hyperlipidemia  This is a chronic problem. The current episode started more than 1 year ago.  The Tablet 24 Hr Take by mouth. • LOSARTAN POTASSIUM 50 MG Oral Tab TAKE 1 TABLET BY MOUTH EVERY DAY 90 tablet 0   • Multiple Vitamins-Minerals (EYE VITAMINS) Oral Cap Take 1 capsule by mouth daily.      • folic acid 1 MG Oral Tab Take 1 tablet (1 mg total) 11/25/2020   • Acute, but ill-defined, cerebrovascular disease     had stroke left frontal, parietal, occipital   • Anxiety    • Atherosclerosis of coronary artery    • Carotid stenosis    • Carpal tunnel syndrome, right 5/21/2016   • [facility]     as eye: No discharge. Conjunctiva/sclera: Conjunctivae normal.      Pupils: Pupils are equal, round, and reactive to light. Neck:      Vascular: No JVD. Cardiovascular:      Rate and Rhythm: Normal rate and regular rhythm.       Heart sounds: Normal h currently using adult diapers.     (M06.9) Rheumatoid arthritis involving multiple sites, unspecified whether rheumatoid factor present ([facility]  Plan: Controlled with current been modulating agents that have been prescribed by her rheumatologist.    (F41.1) G

## 2021-08-31 PROCEDURE — 99490 CHRNC CARE MGMT STAFF 1ST 20: CPT

## 2021-09-10 RX ORDER — ALPRAZOLAM 0.25 MG/1
TABLET ORAL
Qty: 30 TABLET | Refills: 0 | Status: SHIPPED | OUTPATIENT
Start: 2021-09-10 | End: 2021-10-12

## 2021-09-21 ENCOUNTER — PATIENT OUTREACH (OUTPATIENT)
Dept: CASE MANAGEMENT | Age: 86
End: 2021-09-21

## 2021-09-21 NOTE — PROGRESS NOTES
Contacting patient to follow up on CCM for the month.  LMCB    Total time -2  min  Total Monthly time- 2  min

## 2021-09-23 NOTE — PROGRESS NOTES
Spoke to patient states she is making instant pudding, Asked for a return call later in the day.     Total time - 2 min  Total Monthly time- 4  min

## 2021-10-07 RX ORDER — GLIPIZIDE 2.5 MG/1
2.5 TABLET, EXTENDED RELEASE ORAL
Qty: 90 TABLET | Refills: 1 | Status: SHIPPED | OUTPATIENT
Start: 2021-10-07 | End: 2021-12-23

## 2021-10-07 NOTE — TELEPHONE ENCOUNTER
glipiZIDE ER 2.5 MG Oral Tablet 24 Hr  Pt calling stating that [facility] script for above medication. They still have 5mg according to pt.    Pt gave number to call for Dr thornton pharmacy can have confirmation    [phone]

## 2021-10-07 NOTE — TELEPHONE ENCOUNTER
Medication pended for approval due to allergy/ contraindication. Refill passed per [facility] protocol.   Requested Prescriptions   Pending Prescriptions Disp Refills    glipiZIDE ER 2.5 MG Oral Tablet 24 Hr 90 tablet 0     Sig: Take 1 tablet

## 2021-10-12 NOTE — TELEPHONE ENCOUNTER
Please review. Protocol failed / No Protocol.     Requested Prescriptions   Pending Prescriptions Disp Refills    ALPRAZOLAM 0.25 MG Oral Tab [Pharmacy Med Name: ALPRAZOLAM 0.25 MG TABLET] 30 tablet 0     Sig: TAKE 1 TABLET BY MOUTH EVERY DAY AS NEEDED FOR

## 2021-10-13 ENCOUNTER — NURSE TRIAGE (OUTPATIENT)
Dept: INTERNAL MEDICINE CLINIC | Facility: CLINIC | Age: 86
End: 2021-10-13

## 2021-10-13 ENCOUNTER — PATIENT OUTREACH (OUTPATIENT)
Dept: CASE MANAGEMENT | Age: 86
End: 2021-10-13

## 2021-10-13 DIAGNOSIS — F41.1 GENERALIZED ANXIETY DISORDER: ICD-10-CM

## 2021-10-13 DIAGNOSIS — E78.2 MIXED HYPERLIPIDEMIA: ICD-10-CM

## 2021-10-13 DIAGNOSIS — I10 ESSENTIAL HYPERTENSION: ICD-10-CM

## 2021-10-13 DIAGNOSIS — I25.10 CORONARY ARTERIOSCLEROSIS IN NATIVE ARTERY: ICD-10-CM

## 2021-10-13 DIAGNOSIS — E11.9 CONTROLLED TYPE 2 DIABETES MELLITUS WITHOUT COMPLICATION, WITHOUT LONG-TERM CURRENT USE OF INSULIN (HCC): ICD-10-CM

## 2021-10-13 RX ORDER — ALPRAZOLAM 0.25 MG/1
TABLET ORAL
Qty: 30 TABLET | Refills: 0 | Status: SHIPPED | OUTPATIENT
Start: 2021-10-13 | End: 2021-11-15

## 2021-10-13 NOTE — TELEPHONE ENCOUNTER
Action Requested: Summary for Provider     []  Critical Lab, Recommendations Needed  [x] Need Additional Advice  []   FYI    []   Need Orders  [] Need Medications Sent to Pharmacy  []  Other     SUMMARY: Per protocol disposition advised office visit today

## 2021-10-13 NOTE — PROGRESS NOTES
10/13/2021  Spoke to [employer] for CCM. Updates to patient care team/ comments: UTD  Patient reported changes in medications: None  Med Adherence  Comment: Taking as directed    Health Maintenance:  Reviewed with patient.    Zoster Vaccines(1 of 2) Never progress and or barriers towards patients goals. Care managers interventions: Encouraged patient to check BP and BS daily and log them. Advised her to bring readings to appointment on Sat.      Reviewed healthy eating habits, regular exercise and preven

## 2021-10-13 NOTE — TELEPHONE ENCOUNTER
Spoke with patient ( verified) and relayed Dr. Kj Morrison message below--patient verbalizes understanding and agreement. Appt made. No further questions/concerns at this time.     Future Appointments   Date Time Provider Adalgisa Viramontes   10/14/2021

## 2021-10-14 ENCOUNTER — LAB ENCOUNTER (OUTPATIENT)
Dept: LAB | Age: 86
End: 2021-10-14
Attending: INTERNAL MEDICINE
Payer: MEDICARE

## 2021-10-14 ENCOUNTER — OFFICE VISIT (OUTPATIENT)
Dept: INTERNAL MEDICINE CLINIC | Facility: CLINIC | Age: 86
End: 2021-10-14
Payer: MEDICARE

## 2021-10-14 VITALS
TEMPERATURE: 98 F | HEART RATE: 70 BPM | SYSTOLIC BLOOD PRESSURE: 139 MMHG | HEIGHT: 62 IN | OXYGEN SATURATION: 96 % | WEIGHT: 194.88 LBS | BODY MASS INDEX: 35.86 KG/M2 | DIASTOLIC BLOOD PRESSURE: 74 MMHG

## 2021-10-14 DIAGNOSIS — R35.1 NOCTURIA: ICD-10-CM

## 2021-10-14 DIAGNOSIS — R35.1 NOCTURIA: Primary | ICD-10-CM

## 2021-10-14 PROCEDURE — 87086 URINE CULTURE/COLONY COUNT: CPT

## 2021-10-14 PROCEDURE — 87186 SC STD MICRODIL/AGAR DIL: CPT

## 2021-10-14 PROCEDURE — 90662 IIV NO PRSV INCREASED AG IM: CPT | Performed by: INTERNAL MEDICINE

## 2021-10-14 PROCEDURE — 87077 CULTURE AEROBIC IDENTIFY: CPT

## 2021-10-14 PROCEDURE — 99213 OFFICE O/P EST LOW 20 MIN: CPT | Performed by: INTERNAL MEDICINE

## 2021-10-14 PROCEDURE — G0008 ADMIN INFLUENZA VIRUS VAC: HCPCS | Performed by: INTERNAL MEDICINE

## 2021-10-14 PROCEDURE — 81001 URINALYSIS AUTO W/SCOPE: CPT

## 2021-10-14 RX ORDER — LOSARTAN POTASSIUM 100 MG/1
TABLET ORAL
COMMUNITY
Start: 2021-08-25

## 2021-10-14 NOTE — PROGRESS NOTES
Subjective:     Patient ID: Missy Martin is a 82year old female. Urinary  This is a new (complans of nocturia) problem. The current episode started 1 to 4 weeks ago (3 weeks). The problem occurs intermittently.  The problem has been waxing and waning EVERY Thursday. • Red Yeast Rice 600 MG Oral Cap Take 2 tablets by mouth daily. • furosemide 20 MG Oral Tab Take 20 mg by mouth daily as needed.    (Patient not taking: Reported on 10/14/2021)     • LOSARTAN POTASSIUM 50 MG Oral Tab TAKE 1 TABLET B Hyperlipidemia; as per NG   • Hypertension Mother         as per NG   • Heart Disease Mother         CAD; as per NG      Social History: Social History    Tobacco Use      Smoking status: Former Smoker        Quit date: 1/1/1995        Years since [facility] prescriptions requested or ordered in this encounter       Imaging & Referrals:  None

## 2021-10-16 ENCOUNTER — TELEPHONE (OUTPATIENT)
Dept: INTERNAL MEDICINE CLINIC | Facility: CLINIC | Age: 86
End: 2021-10-16

## 2021-10-16 RX ORDER — CIPROFLOXACIN 250 MG/1
250 TABLET, FILM COATED ORAL 2 TIMES DAILY
Qty: 14 TABLET | Refills: 0 | Status: SHIPPED | OUTPATIENT
Start: 2021-10-16 | End: 2022-01-18 | Stop reason: ALTCHOICE

## 2021-10-16 NOTE — TELEPHONE ENCOUNTER
pls call pt; her urine culture showed she does have uti. She is allergic to lots of abx. We can start her on cipro 250mg po bid for one week. Hold glipizide while on cipro. Call us back in one week if her nocturia doesn't improve.

## 2021-10-31 PROCEDURE — 99490 CHRNC CARE MGMT STAFF 1ST 20: CPT

## 2021-11-15 RX ORDER — ALPRAZOLAM 0.25 MG/1
TABLET ORAL
Qty: 30 TABLET | Refills: 0 | Status: SHIPPED | OUTPATIENT
Start: 2021-11-15 | End: 2021-12-20

## 2021-11-15 NOTE — TELEPHONE ENCOUNTER
Please review; protocol failed/no protocol    Requested Prescriptions   Pending Prescriptions Disp Refills    ALPRAZOLAM 0.25 MG Oral Tab [Pharmacy Med Name: ALPRAZOLAM 0.25 MG TABLET] 30 tablet 0     Sig: TAKE 1 TABLET BY MOUTH EVERY DAY AS NEEDED FOR ANX

## 2021-11-16 ENCOUNTER — PATIENT OUTREACH (OUTPATIENT)
Dept: CASE MANAGEMENT | Age: 86
End: 2021-11-16

## 2021-11-16 DIAGNOSIS — I10 ESSENTIAL HYPERTENSION: ICD-10-CM

## 2021-11-16 DIAGNOSIS — I25.10 CORONARY ARTERIOSCLEROSIS IN NATIVE ARTERY: ICD-10-CM

## 2021-11-16 DIAGNOSIS — E11.9 CONTROLLED TYPE 2 DIABETES MELLITUS WITHOUT COMPLICATION, WITHOUT LONG-TERM CURRENT USE OF INSULIN (HCC): ICD-10-CM

## 2021-11-16 DIAGNOSIS — Z91.81 RISK FOR FALLS: ICD-10-CM

## 2021-11-16 DIAGNOSIS — M06.9 RHEUMATOID ARTHRITIS INVOLVING MULTIPLE SITES, UNSPECIFIED WHETHER RHEUMATOID FACTOR PRESENT (HCC): ICD-10-CM

## 2021-11-16 NOTE — PROGRESS NOTES
11/16/2021  Spoke to [employer] for CCM. Updates to patient care team/ comments: UTD  Patient reported changes in medications: None  Med Adherence  Comment: Taking as directed    Health Maintenance:  Reviewed with patient.    Zoster Vaccines(1 of 2) Never yes  Self-Management Abilities (patient reported)             1= least confident in achieving goal, 5= very confident               - confidence: 4    Care Manager Follow Up:  One month    Reason For Follow Up: review progress and or barriers towards patien

## 2021-11-30 PROCEDURE — 99490 CHRNC CARE MGMT STAFF 1ST 20: CPT

## 2021-12-15 ENCOUNTER — PATIENT OUTREACH (OUTPATIENT)
Dept: CASE MANAGEMENT | Age: 86
End: 2021-12-15

## 2021-12-15 DIAGNOSIS — I10 ESSENTIAL HYPERTENSION: ICD-10-CM

## 2021-12-15 DIAGNOSIS — E11.9 CONTROLLED TYPE 2 DIABETES MELLITUS WITHOUT COMPLICATION, WITHOUT LONG-TERM CURRENT USE OF INSULIN (HCC): ICD-10-CM

## 2021-12-15 DIAGNOSIS — I25.10 CORONARY ARTERIOSCLEROSIS IN NATIVE ARTERY: ICD-10-CM

## 2021-12-15 DIAGNOSIS — F41.1 GENERALIZED ANXIETY DISORDER: ICD-10-CM

## 2021-12-15 NOTE — PROGRESS NOTES
12/15/2021  Spoke to [employer] for CCM.       Updates to patient care team/ comments: UTD  Patient reported changes in medications: None  Med Adherence  Comment: Taking as directed    Health Maintenance:  Zoster Vaccines(1 of 2) Never done Not covered by her i all barriers: Patient states she will start to prepare for colder weather and snow by picking up items to keep her busy and active at home. Patient agrees to goal action plan.  yes  Self-Management Abilities (patient reported)             1= matias

## 2021-12-16 ENCOUNTER — APPOINTMENT (OUTPATIENT)
Dept: URBAN - METROPOLITAN AREA CLINIC 321 | Age: 86
Setting detail: DERMATOLOGY
End: 2021-12-16

## 2021-12-16 DIAGNOSIS — B07.8 OTHER VIRAL WARTS: ICD-10-CM

## 2021-12-16 DIAGNOSIS — L72.8 OTHER FOLLICULAR CYSTS OF THE SKIN AND SUBCUTANEOUS TISSUE: ICD-10-CM

## 2021-12-16 DIAGNOSIS — L57.0 ACTINIC KERATOSIS: ICD-10-CM

## 2021-12-16 PROCEDURE — 17000 DESTRUCT PREMALG LESION: CPT | Mod: 59

## 2021-12-16 PROCEDURE — OTHER LIQUID NITROGEN: OTHER

## 2021-12-16 PROCEDURE — 17110 DESTRUCT B9 LESION 1-14: CPT

## 2021-12-16 PROCEDURE — 99202 OFFICE O/P NEW SF 15 MIN: CPT | Mod: 25

## 2021-12-16 PROCEDURE — OTHER COUNSELING: OTHER

## 2021-12-16 ASSESSMENT — LOCATION ZONE DERM
LOCATION ZONE: FACE
LOCATION ZONE: HAND

## 2021-12-16 ASSESSMENT — LOCATION DETAILED DESCRIPTION DERM
LOCATION DETAILED: RIGHT SUPERIOR FOREHEAD
LOCATION DETAILED: LEFT RADIAL DORSAL HAND
LOCATION DETAILED: RIGHT SUPERIOR CENTRAL MALAR CHEEK

## 2021-12-16 ASSESSMENT — LOCATION SIMPLE DESCRIPTION DERM
LOCATION SIMPLE: RIGHT CHEEK
LOCATION SIMPLE: RIGHT FOREHEAD
LOCATION SIMPLE: LEFT HAND

## 2021-12-16 NOTE — PROCEDURE: LIQUID NITROGEN
Detail Level: Simple
Render Note In Bullet Format When Appropriate: No
Medical Necessity Clause: This procedure was medically necessary because the lesions that were treated were:
Duration Of Freeze Thaw-Cycle (Seconds): 0
Consent: The patient's consent was obtained including but not limited to risks of crusting, scabbing, blistering, scarring, darker or lighter pigmentary change, recurrence, incomplete removal and infection.
Total Number Of Aks Treated: 1
Show Topical Anesthesia Variable?: Yes
Post-Care Instructions: I reviewed with the patient in detail post-care instructions. Patient is to wear sunprotection, and avoid picking at any of the treated lesions. Pt may apply Vaseline to crusted or scabbing areas.
Number Of Freeze-Thaw Cycles: 1 freeze-thaw cycle
Medical Necessity Information: It is in your best interest to select a reason for this procedure from the list below. All of these items fulfill various CMS LCD requirements except the new and changing color options.
Duration Of Freeze Thaw-Cycle (Seconds): 5-10
Detail Level: Zone

## 2021-12-20 RX ORDER — ALPRAZOLAM 0.25 MG/1
TABLET ORAL
Qty: 30 TABLET | Refills: 0 | Status: SHIPPED | OUTPATIENT
Start: 2021-12-20 | End: 2022-01-26

## 2021-12-22 NOTE — TELEPHONE ENCOUNTER
Refill passed per [facility] protocol. RN cannot refill due to contraindication.    Requested Prescriptions   Pending Prescriptions Disp Refills    GLIPIZIDE ER 2.5 MG Oral Tablet 24 Hr [Pharmacy Med Name: GLIPIZIDE ER 2.5 MG TABLET] 90 tablet 1     Si

## 2021-12-23 RX ORDER — GLIPIZIDE 2.5 MG/1
TABLET, EXTENDED RELEASE ORAL
Qty: 90 TABLET | Refills: 1 | Status: SHIPPED | OUTPATIENT
Start: 2021-12-23

## 2021-12-31 PROCEDURE — 99490 CHRNC CARE MGMT STAFF 1ST 20: CPT

## 2022-01-06 ENCOUNTER — PATIENT OUTREACH (OUTPATIENT)
Dept: CASE MANAGEMENT | Age: 87
End: 2022-01-06

## 2022-01-06 RX ORDER — EZETIMIBE 10 MG/1
TABLET ORAL
Qty: 90 TABLET | Refills: 1 | Status: SHIPPED | OUTPATIENT
Start: 2022-01-06

## 2022-01-06 RX ORDER — GABAPENTIN 300 MG/1
CAPSULE ORAL
Qty: 270 CAPSULE | Refills: 1 | Status: SHIPPED | OUTPATIENT
Start: 2022-01-06

## 2022-01-06 NOTE — PROGRESS NOTES
1/6/2022  Spoke to [employer] for CCM. Updates to patient care team/ comments: UTD  Patient reported changes in medications: None  Med Adherence  Comment: Taking as directed    Health Maintenance:  UTD, reviewed with patient.    DEXA Scan Never done Yosephin barriers: She continues to feel down, however is trying to keep busy. • Patient Reported New Barriers And Concerns: No, new barriers at this time. - Plan for overcoming all barriers: n/a            Patient agrees to goal action plan.  y

## 2022-01-18 ENCOUNTER — OFFICE VISIT (OUTPATIENT)
Dept: INTERNAL MEDICINE CLINIC | Facility: CLINIC | Age: 87
End: 2022-01-18
Payer: MEDICARE

## 2022-01-18 VITALS
SYSTOLIC BLOOD PRESSURE: 124 MMHG | HEIGHT: 62 IN | HEART RATE: 83 BPM | RESPIRATION RATE: 15 BRPM | BODY MASS INDEX: 36.62 KG/M2 | WEIGHT: 199 LBS | DIASTOLIC BLOOD PRESSURE: 74 MMHG

## 2022-01-18 DIAGNOSIS — Z00.00 MEDICARE ANNUAL WELLNESS VISIT, SUBSEQUENT: Primary | ICD-10-CM

## 2022-01-18 DIAGNOSIS — I15.2 HYPERTENSION ASSOCIATED WITH TYPE 2 DIABETES MELLITUS (HCC): ICD-10-CM

## 2022-01-18 DIAGNOSIS — I25.10 CORONARY ARTERIOSCLEROSIS IN NATIVE ARTERY: ICD-10-CM

## 2022-01-18 DIAGNOSIS — I73.9 PERIPHERAL VASCULAR DISEASE (HCC): ICD-10-CM

## 2022-01-18 DIAGNOSIS — E11.9 CONTROLLED TYPE 2 DIABETES MELLITUS WITHOUT COMPLICATION, WITHOUT LONG-TERM CURRENT USE OF INSULIN (HCC): ICD-10-CM

## 2022-01-18 DIAGNOSIS — I47.1 SUPRAVENTRICULAR TACHYCARDIA (HCC): ICD-10-CM

## 2022-01-18 DIAGNOSIS — I70.0 THORACIC AORTIC ATHEROSCLEROSIS (HCC): ICD-10-CM

## 2022-01-18 DIAGNOSIS — N18.31 STAGE 3A CHRONIC KIDNEY DISEASE (HCC): ICD-10-CM

## 2022-01-18 DIAGNOSIS — J06.9 VIRAL UPPER RESPIRATORY TRACT INFECTION: ICD-10-CM

## 2022-01-18 DIAGNOSIS — Z98.890 STATUS POST CAROTID ENDARTERECTOMY: ICD-10-CM

## 2022-01-18 DIAGNOSIS — E78.5 HYPERLIPIDEMIA ASSOCIATED WITH TYPE 2 DIABETES MELLITUS (HCC): ICD-10-CM

## 2022-01-18 DIAGNOSIS — Z91.81 RISK FOR FALLS: ICD-10-CM

## 2022-01-18 DIAGNOSIS — F41.1 GENERALIZED ANXIETY DISORDER: ICD-10-CM

## 2022-01-18 DIAGNOSIS — M06.9 RHEUMATOID ARTHRITIS INVOLVING MULTIPLE SITES, UNSPECIFIED WHETHER RHEUMATOID FACTOR PRESENT (HCC): ICD-10-CM

## 2022-01-18 DIAGNOSIS — E11.69 HYPERLIPIDEMIA ASSOCIATED WITH TYPE 2 DIABETES MELLITUS (HCC): ICD-10-CM

## 2022-01-18 DIAGNOSIS — K21.9 GASTROESOPHAGEAL REFLUX DISEASE, UNSPECIFIED WHETHER ESOPHAGITIS PRESENT: ICD-10-CM

## 2022-01-18 DIAGNOSIS — E11.59 HYPERTENSION ASSOCIATED WITH TYPE 2 DIABETES MELLITUS (HCC): ICD-10-CM

## 2022-01-18 PROBLEM — H92.01 RIGHT EAR PAIN: Status: RESOLVED | Noted: 2021-06-03 | Resolved: 2022-01-18

## 2022-01-18 PROBLEM — I35.8 AORTIC VALVE SCLEROSIS: Status: RESOLVED | Noted: 2017-07-24 | Resolved: 2022-01-18

## 2022-01-18 PROBLEM — R51.9 NONINTRACTABLE HEADACHE: Status: RESOLVED | Noted: 2021-01-10 | Resolved: 2022-01-18

## 2022-01-18 PROBLEM — R05.9 COUGH: Status: RESOLVED | Noted: 2017-01-24 | Resolved: 2022-01-18

## 2022-01-18 PROBLEM — E66.01 MORBID (SEVERE) OBESITY DUE TO EXCESS CALORIES (HCC): Status: ACTIVE | Noted: 2022-01-18

## 2022-01-18 PROCEDURE — G0439 PPPS, SUBSEQ VISIT: HCPCS | Performed by: INTERNAL MEDICINE

## 2022-01-18 NOTE — PROGRESS NOTES
Subjective:   Azeem Marques is a 82year old female who presents for a Medicare Subsequent Annual Wellness visit (Pt already had Initial Annual Wellness). History/Other:   Fall Risk Assessment:   She has been screened for Falls and is High Risk.  Fa arteriosclerosis in native artery     Chronic kidney disease, stage III (moderate) (HCC)     Peripheral vascular disease (HCC)     Supraventricular tachycardia (HCC)     Edema of left upper arm     Morbid (severe) obesity due to excess calories ([address]     V Other and unspecified hyperlipidemia, Rheumatoid arthritis ([facility] [address] Status post carotid endarterectomy (4/13/2015), Type II or unspecified type diabetes mellitus without mention of complication, not stated as uncontrolled, and Unspecified essential hypertens pain, denies heartburn; no hematochezia  : denies dysuria, vaginal discharge or itching, no complaint of urinary incontinence ; no hematuria  MUSCULOSKELETAL: denies back pain  NEURO: denies headaches  PSYCHE: denies depression or anxiety  HEMATOLOGIC: d following the conversations when two or more people are talking at the same time: No   I have trouble understanding things on the TV: No I have to strain to understand conversations: No   I have to worry about missing the telephone ring or doorbell: No I h kidney disease ([facility]  Plan: Continued avoidance of NSAIDs.   Continue control of her blood pressure as well as diabetes.    (E11.69,  E78.5) Hyperlipidemia associated with type 2 diabetes mellitus ([facility]  Plan: Continue with her cholesterol medication and lo cardiologist.  She is currently on statin therapy as well as antiplatelet med.    (W35.3) Thoracic aortic atherosclerosis ([facility]  Plan: Patient already on a statin as well as antiplatelet therapy.     The patient indicates understanding of these issues and a (HDL)  Triglycerides Covered every 5 years for all Medicare beneficiaries without apparent signs or symptoms of cardiovascular disease Lab Results   Component Value Date    CHOLEST 165 01/08/2021    HDL 57 01/08/2021    LDL 78 01/08/2021    TRIG 149 01/08/ time    Hepatitis B One screening covered for patients with certain risk factors   -  No recommendations at this time    Tetanus Toxoid Not covered by Medicare Part B unless medically necessary (cut with metal); may be covered with your pharmacy prescripti

## 2022-01-19 ENCOUNTER — TELEPHONE (OUTPATIENT)
Dept: INTERNAL MEDICINE CLINIC | Facility: CLINIC | Age: 87
End: 2022-01-19

## 2022-01-19 NOTE — TELEPHONE ENCOUNTER
Pt would like Dr. Taylor Joshua to know that the [place] location did not have PCR tests available for Pt and it will take about 2-3 weeks for them to have the tests.     Pt states she will go to another location near her house instead and will follow-up with

## 2022-01-25 ENCOUNTER — PATIENT OUTREACH (OUTPATIENT)
Dept: CASE MANAGEMENT | Age: 87
End: 2022-01-25

## 2022-01-25 DIAGNOSIS — I15.2 HYPERTENSION ASSOCIATED WITH TYPE 2 DIABETES MELLITUS (HCC): ICD-10-CM

## 2022-01-25 DIAGNOSIS — E11.9 CONTROLLED TYPE 2 DIABETES MELLITUS WITHOUT COMPLICATION, WITHOUT LONG-TERM CURRENT USE OF INSULIN (HCC): ICD-10-CM

## 2022-01-25 DIAGNOSIS — I25.10 CORONARY ARTERIOSCLEROSIS IN NATIVE ARTERY: ICD-10-CM

## 2022-01-25 DIAGNOSIS — E11.59 HYPERTENSION ASSOCIATED WITH TYPE 2 DIABETES MELLITUS (HCC): ICD-10-CM

## 2022-01-25 DIAGNOSIS — E78.5 HYPERLIPIDEMIA ASSOCIATED WITH TYPE 2 DIABETES MELLITUS (HCC): ICD-10-CM

## 2022-01-25 DIAGNOSIS — F41.1 GENERALIZED ANXIETY DISORDER: ICD-10-CM

## 2022-01-25 DIAGNOSIS — E11.69 HYPERLIPIDEMIA ASSOCIATED WITH TYPE 2 DIABETES MELLITUS (HCC): ICD-10-CM

## 2022-01-25 NOTE — PROGRESS NOTES
1/25/2022  Spoke to [employer] for CCM. Updates to patient care team/ comments: UTD  Patient reported changes in medications: None  Med Adherence  Comment: Taking as directed    Health Maintenance:  Reviewed with patient.    Zoster Vaccines(1 of 2) Never d plan. yes  Self-Management Abilities (patient reported)             1= least confident in achieving goal, 5= very confident               - confidence: 4    Care Manager Follow Up:  One month    Reason For Follow Up: review progress and or barriers towards

## 2022-01-25 NOTE — TELEPHONE ENCOUNTER
Patient calling regarding her Hemoglobin A1C results. Per patient, she was in the office to see Dr. Sandra Nunes on 07/02/18 and lab was ordered. Per patient, she went to [facility] ([phone]) to get labs done on 07/03/18.     Patient calling supple/no JVD

## 2022-01-26 DIAGNOSIS — K21.9 GASTROESOPHAGEAL REFLUX DISEASE, UNSPECIFIED WHETHER ESOPHAGITIS PRESENT: ICD-10-CM

## 2022-01-26 RX ORDER — ALPRAZOLAM 0.25 MG/1
TABLET ORAL
Qty: 30 TABLET | Refills: 0 | Status: SHIPPED | OUTPATIENT
Start: 2022-01-26

## 2022-01-26 RX ORDER — OMEPRAZOLE 20 MG/1
CAPSULE, DELAYED RELEASE ORAL
Qty: 90 CAPSULE | Refills: 1 | OUTPATIENT
Start: 2022-01-26

## 2022-01-26 NOTE — TELEPHONE ENCOUNTER
Spoke with patient informed her she still has remaining refills on her omeprazole. Patient verbalized understanding no further questions.

## 2022-01-31 ENCOUNTER — NURSE TRIAGE (OUTPATIENT)
Dept: INTERNAL MEDICINE CLINIC | Facility: CLINIC | Age: 87
End: 2022-01-31

## 2022-01-31 ENCOUNTER — OFFICE VISIT (OUTPATIENT)
Dept: INTERNAL MEDICINE CLINIC | Facility: CLINIC | Age: 87
End: 2022-01-31
Payer: MEDICARE

## 2022-01-31 VITALS
DIASTOLIC BLOOD PRESSURE: 78 MMHG | WEIGHT: 196 LBS | HEART RATE: 84 BPM | HEIGHT: 62 IN | SYSTOLIC BLOOD PRESSURE: 126 MMHG | RESPIRATION RATE: 12 BRPM | BODY MASS INDEX: 36.07 KG/M2

## 2022-01-31 DIAGNOSIS — J30.89 NON-SEASONAL ALLERGIC RHINITIS, UNSPECIFIED TRIGGER: Primary | ICD-10-CM

## 2022-01-31 DIAGNOSIS — D64.9 ANEMIA, UNSPECIFIED TYPE: ICD-10-CM

## 2022-01-31 PROCEDURE — 99490 CHRNC CARE MGMT STAFF 1ST 20: CPT

## 2022-01-31 PROCEDURE — 99213 OFFICE O/P EST LOW 20 MIN: CPT | Performed by: INTERNAL MEDICINE

## 2022-01-31 RX ORDER — FLUTICASONE PROPIONATE 50 MCG
2 SPRAY, SUSPENSION (ML) NASAL DAILY
Qty: 1 EACH | Refills: 1 | Status: SHIPPED | OUTPATIENT
Start: 2022-01-31 | End: 2023-01-26

## 2022-01-31 NOTE — PROGRESS NOTES
Subjective:     Patient ID: Oriana Powell is a 82year old female. Pt complained of having nasal congestion/rhinorrhea and noted also something coming out of her left nostril like a worm per pt.  She said it was about less than an inch in length, not m 100 MG Oral Tab  (Patient not taking: Reported on 1/18/2022)     • furosemide 20 MG Oral Tab Take 20 mg by mouth daily as needed.    (Patient not taking: No sig reported)     • Multiple Vitamins-Minerals (EYE VITAMINS) Oral Cap Take 1 capsule by mouth daily Hypertension Mother         as per NG   • Heart Disease Mother         CAD; as per NG      Social History: Social History    Tobacco Use      Smoking status: Former Smoker        Quit date: 1995        Years since quittin.1      Smokeless tobacco: Referrals:  None

## 2022-01-31 NOTE — TELEPHONE ENCOUNTER
Action Requested: Summary for Provider     []  Critical Lab, Recommendations Needed  [] Need Additional Advice  []   FYI    []   Need Orders  [] Need Medications Sent to Pharmacy  []  Other     SUMMARY: Appt scheduled today with Dr Isabel Grayson.    Reason fo

## 2022-02-17 ENCOUNTER — PATIENT OUTREACH (OUTPATIENT)
Dept: CASE MANAGEMENT | Age: 87
End: 2022-02-17

## 2022-02-22 RX ORDER — FLUTICASONE PROPIONATE 50 MCG
2 SPRAY, SUSPENSION (ML) NASAL DAILY
Qty: 16 ML | Refills: 1 | Status: SHIPPED | OUTPATIENT
Start: 2022-02-22 | End: 2022-03-18 | Stop reason: ALTCHOICE

## 2022-02-22 NOTE — TELEPHONE ENCOUNTER
[facility].   Requested Prescriptions   Pending Prescriptions Disp Refills    FLUTICASONE PROPIONATE 50 MCG/ACT Nasal Suspension [Pharmacy Med Name: FLUTICASONE PROP 50 MCG SPRAY] 16 mL 1     Sig: SPRAY 2 SPRAYS INTO EACH NOSTRIL EVERY DAY        Allergy Medication Protocol Passed - 2/22/2022  1:31 PM        Passed - Appoinment in past 12 or next 3 months             Recent Outpatient Visits              3 weeks ago Non-seasonal allergic rhinitis, unspecified trigger    [address] MD    Office Visit    1 month ago Nick Mares annual wellness visit, subsequent    [facility], Thai Lay MD    Office Visit    4 months ago Nocturia    [facility], Thai Lay MD    Office Visit    6 months ago Controlled type 2 diabetes mellitus without complication, without long-term current use of insulin [facility])    [facility], Thai Lay MD    Office Visit    8 months ago Cough    [facility], Thai Lay MD    Office Visit

## 2022-02-26 RX ORDER — ALPRAZOLAM 0.25 MG/1
TABLET ORAL
Qty: 30 TABLET | Refills: 0 | Status: SHIPPED | OUTPATIENT
Start: 2022-02-26 | End: 2022-03-31

## 2022-02-26 RX ORDER — OMEPRAZOLE 20 MG/1
CAPSULE, DELAYED RELEASE ORAL
Qty: 90 CAPSULE | Refills: 1 | Status: SHIPPED | OUTPATIENT
Start: 2022-02-26

## 2022-02-26 NOTE — TELEPHONE ENCOUNTER
Please review; protocol failed/No Protcol    Requested Prescriptions   Pending Prescriptions Disp Refills    ALPRAZOLAM 0.25 MG Oral Tab [Pharmacy Med Name: ALPRAZOLAM 0.25 MG TABLET] 30 tablet 0     Sig: TAKE 1 TABLET BY MOUTH EVERY DAY AS NEEDED FOR ANXIETY        There is no refill protocol information for this order       Signed Prescriptions Disp Refills    OMEPRAZOLE 20 MG Oral Capsule Delayed Release 90 capsule 1     Sig: TAKE 1 CAPSULE BY MOUTH BEFORE BREAKFAST EVERY DAY        Gastrointestional Medication Protocol Passed - 2/25/2022 12:52 PM        Passed - Appointment in past 12 or next 3 months              Recent Outpatient Visits              3 weeks ago Non-seasonal allergic rhinitis, unspecified trigger    [address], Thai Rizzo MD    Office Visit    1 month ago Nick Mares annual wellness visit, subsequent    [address], Thai Rizzo MD    Office Visit    4 months ago Nocturia    [address], Thai Rizzo MD    Office Visit    6 months ago Controlled type 2 diabetes mellitus without complication, without long-term current use of insulin [facility])    [address], Thai Rizzo MD    Office Visit    8 months ago Felicity Velasco, Thai Rizzo MD    Office Visit

## 2022-02-26 NOTE — TELEPHONE ENCOUNTER
Refill passed per [facility] protocol.     Requested Prescriptions   Pending Prescriptions Disp Refills    OMEPRAZOLE 20 MG Oral Capsule Delayed Release [Pharmacy Med Name: OMEPRAZOLE DR 20 MG CAPSULE] 90 capsule 1     Sig: TAKE 1 CAPSULE BY MOUTH BEFORE BREAKFAST EVERY DAY        Gastrointestional Medication Protocol Passed - 2/25/2022 12:52 PM        Passed - Appointment in past 12 or next 3 months           ALPRAZOLAM 0.25 MG Oral Tab [Pharmacy Med Name: ALPRAZOLAM 0.25 MG TABLET] 30 tablet 0     Sig: TAKE 1 TABLET BY MOUTH EVERY DAY AS NEEDED FOR ANXIETY        There is no refill protocol information for this order               Recent Outpatient Visits              3 weeks ago Non-seasonal allergic rhinitis, unspecified trigger    [facility], [address], Bethanie Cabot, MD    Office Visit    1 month ago Medicare annual wellness visit, subsequent    [place] [facility], [address], Bethanie Cabot, MD    Office Visit    4 months ago Nocturia    [facility], [address], Bethanie Cabot, MD    Office Visit    6 months ago Controlled type 2 diabetes mellitus without complication, without long-term current use of insulin [facility])    [place] [facility], [address], Bethanie Cabot, MD    Office Visit    8 months ago Cough    [place] [facility], [address], Bethanie Cabot, MD    Office Visit

## 2022-02-28 PROCEDURE — 99490 CHRNC CARE MGMT STAFF 1ST 20: CPT

## 2022-03-10 ENCOUNTER — PATIENT OUTREACH (OUTPATIENT)
Dept: CASE MANAGEMENT | Age: 87
End: 2022-03-10

## 2022-03-18 ENCOUNTER — APPOINTMENT (OUTPATIENT)
Dept: GENERAL RADIOLOGY | Age: 87
End: 2022-03-18
Attending: NURSE PRACTITIONER
Payer: MEDICARE

## 2022-03-18 ENCOUNTER — HOSPITAL ENCOUNTER (OUTPATIENT)
Age: 87
Discharge: HOME | End: 2022-03-18
Payer: MEDICARE

## 2022-03-18 VITALS
OXYGEN SATURATION: 96 % | TEMPERATURE: 99 F | HEIGHT: 62 IN | BODY MASS INDEX: 36.99 KG/M2 | WEIGHT: 201 LBS | RESPIRATION RATE: 18 BRPM | DIASTOLIC BLOOD PRESSURE: 50 MMHG | HEART RATE: 98 BPM | SYSTOLIC BLOOD PRESSURE: 149 MMHG

## 2022-03-18 DIAGNOSIS — J01.10 ACUTE NON-RECURRENT FRONTAL SINUSITIS: ICD-10-CM

## 2022-03-18 DIAGNOSIS — Z20.822 ENCOUNTER FOR LABORATORY TESTING FOR COVID-19 VIRUS: Primary | ICD-10-CM

## 2022-03-18 DIAGNOSIS — R05.9 COUGH: ICD-10-CM

## 2022-03-18 LAB
#MXD IC: 1.8 X10ˆ3/UL (ref 0.1–1)
BUN BLD-MCNC: 22 MG/DL (ref 7–18)
CHLORIDE BLD-SCNC: 107 MMOL/L (ref 98–112)
CO2 BLD-SCNC: 26 MMOL/L (ref 21–32)
CREAT BLD-MCNC: 0.8 MG/DL
GLUCOSE BLD-MCNC: 87 MG/DL (ref 70–99)
HCT VFR BLD AUTO: 43.4 %
HCT VFR BLD CALC: 46 %
HGB BLD-MCNC: 13.8 G/DL
ISTAT IONIZED CALCIUM FOR CHEM 8: 1.21 MMOL/L (ref 1.12–1.32)
LYMPHOCYTES # BLD AUTO: 1.7 X10ˆ3/UL (ref 1–4)
LYMPHOCYTES NFR BLD AUTO: 17.2 %
MCH RBC QN AUTO: 27.8 PG (ref 26–34)
MCHC RBC AUTO-ENTMCNC: 31.8 G/DL (ref 31–37)
MCV RBC AUTO: 87.5 FL (ref 80–100)
MIXED CELL %: 18.3 %
NEUTROPHILS # BLD AUTO: 6.1 X10ˆ3/UL (ref 1.5–7.7)
NEUTROPHILS NFR BLD AUTO: 64.5 %
PLATELET # BLD AUTO: 196 X10ˆ3/UL (ref 150–450)
POTASSIUM BLD-SCNC: 4.4 MMOL/L (ref 3.6–5.1)
RBC # BLD AUTO: 4.96 X10ˆ6/UL
S PYO AG THROAT QL: NEGATIVE
SARS-COV-2 RNA RESP QL NAA+PROBE: NOT DETECTED
SODIUM BLD-SCNC: 142 MMOL/L (ref 136–145)
TROPONIN I BLD-MCNC: <0.02 NG/ML
WBC # BLD AUTO: 9.6 X10ˆ3/UL (ref 4–11)

## 2022-03-18 PROCEDURE — 80047 BASIC METABLC PNL IONIZED CA: CPT | Performed by: NURSE PRACTITIONER

## 2022-03-18 PROCEDURE — 36415 COLL VENOUS BLD VENIPUNCTURE: CPT | Performed by: NURSE PRACTITIONER

## 2022-03-18 PROCEDURE — U0002 COVID-19 LAB TEST NON-CDC: HCPCS | Performed by: NURSE PRACTITIONER

## 2022-03-18 PROCEDURE — 93000 ELECTROCARDIOGRAM COMPLETE: CPT | Performed by: NURSE PRACTITIONER

## 2022-03-18 PROCEDURE — 85025 COMPLETE CBC W/AUTO DIFF WBC: CPT | Performed by: NURSE PRACTITIONER

## 2022-03-18 PROCEDURE — 99214 OFFICE O/P EST MOD 30 MIN: CPT | Performed by: NURSE PRACTITIONER

## 2022-03-18 PROCEDURE — 84484 ASSAY OF TROPONIN QUANT: CPT | Performed by: NURSE PRACTITIONER

## 2022-03-18 PROCEDURE — 71046 X-RAY EXAM CHEST 2 VIEWS: CPT | Performed by: NURSE PRACTITIONER

## 2022-03-18 PROCEDURE — 87880 STREP A ASSAY W/OPTIC: CPT | Performed by: NURSE PRACTITIONER

## 2022-03-18 RX ORDER — TRIAMCINOLONE ACETONIDE 55 UG/1
1 SPRAY, METERED NASAL 2 TIMES DAILY
Qty: 10.8 ML | Refills: 0 | Status: SHIPPED | OUTPATIENT
Start: 2022-03-18

## 2022-03-18 NOTE — ED INITIAL ASSESSMENT (HPI)
Pt here w c/o cough, pain to sinus area, sneezing, runny nose post nasal drip, watery eyes and chest tightness. Pt states she is concerned that her cold can turn into pneumonia as she has a hx of it. Pt denies SOB and fever.

## 2022-03-31 PROCEDURE — 99490 CHRNC CARE MGMT STAFF 1ST 20: CPT

## 2022-04-01 RX ORDER — ALPRAZOLAM 0.25 MG/1
TABLET ORAL
Qty: 30 TABLET | Refills: 1 | Status: SHIPPED | OUTPATIENT
Start: 2022-04-01

## 2022-04-01 NOTE — TELEPHONE ENCOUNTER
Please review refill protocol failed/ no protocol  Requested Prescriptions   Pending Prescriptions Disp Refills    ALPRAZOLAM 0.25 MG Oral Tab [Pharmacy Med Name: ALPRAZOLAM 0.25 MG TABLET] 30 tablet 0     Sig: TAKE 1 TABLET BY MOUTH EVERY DAY AS NEEDED FOR ANXIETY        There is no refill protocol information for this order

## 2022-04-07 ENCOUNTER — MED REC SCAN ONLY (OUTPATIENT)
Dept: INTERNAL MEDICINE CLINIC | Facility: CLINIC | Age: 87
End: 2022-04-07

## 2022-04-13 ENCOUNTER — PATIENT OUTREACH (OUTPATIENT)
Dept: CASE MANAGEMENT | Age: 87
End: 2022-04-13

## 2022-04-14 ENCOUNTER — TELEPHONE (OUTPATIENT)
Dept: INTERNAL MEDICINE CLINIC | Facility: CLINIC | Age: 87
End: 2022-04-14

## 2022-04-14 NOTE — TELEPHONE ENCOUNTER
Left message on home and mobile number for pt to call back to inform that pt will need to  schedule a pre op exam at the end of April for medical clearance prior to right shoulder surgery on 5/25/2022 with Dr Latosha Briceño.

## 2022-04-19 ENCOUNTER — PATIENT OUTREACH (OUTPATIENT)
Dept: CASE MANAGEMENT | Age: 87
End: 2022-04-19

## 2022-04-21 ENCOUNTER — PATIENT OUTREACH (OUTPATIENT)
Dept: CASE MANAGEMENT | Age: 87
End: 2022-04-21

## 2022-05-02 ENCOUNTER — OFFICE VISIT (OUTPATIENT)
Dept: INTERNAL MEDICINE CLINIC | Facility: CLINIC | Age: 87
End: 2022-05-02
Payer: MEDICARE

## 2022-05-02 VITALS
WEIGHT: 201 LBS | SYSTOLIC BLOOD PRESSURE: 134 MMHG | DIASTOLIC BLOOD PRESSURE: 68 MMHG | HEART RATE: 78 BPM | BODY MASS INDEX: 37.95 KG/M2 | RESPIRATION RATE: 12 BRPM | HEIGHT: 61 IN

## 2022-05-02 DIAGNOSIS — M19.011 PRIMARY OSTEOARTHRITIS OF RIGHT SHOULDER: ICD-10-CM

## 2022-05-02 DIAGNOSIS — E11.59 HYPERTENSION ASSOCIATED WITH TYPE 2 DIABETES MELLITUS (HCC): ICD-10-CM

## 2022-05-02 DIAGNOSIS — E11.9 CONTROLLED TYPE 2 DIABETES MELLITUS WITHOUT COMPLICATION, WITHOUT LONG-TERM CURRENT USE OF INSULIN (HCC): ICD-10-CM

## 2022-05-02 DIAGNOSIS — E11.69 HYPERLIPIDEMIA ASSOCIATED WITH TYPE 2 DIABETES MELLITUS (HCC): ICD-10-CM

## 2022-05-02 DIAGNOSIS — I15.2 HYPERTENSION ASSOCIATED WITH TYPE 2 DIABETES MELLITUS (HCC): ICD-10-CM

## 2022-05-02 DIAGNOSIS — Z01.818 PREOP GENERAL PHYSICAL EXAM: Primary | ICD-10-CM

## 2022-05-02 DIAGNOSIS — M06.9 RHEUMATOID ARTHRITIS INVOLVING MULTIPLE SITES, UNSPECIFIED WHETHER RHEUMATOID FACTOR PRESENT (HCC): ICD-10-CM

## 2022-05-02 DIAGNOSIS — I25.10 CORONARY ARTERIOSCLEROSIS IN NATIVE ARTERY: ICD-10-CM

## 2022-05-02 DIAGNOSIS — E78.5 HYPERLIPIDEMIA ASSOCIATED WITH TYPE 2 DIABETES MELLITUS (HCC): ICD-10-CM

## 2022-05-02 PROCEDURE — 99214 OFFICE O/P EST MOD 30 MIN: CPT | Performed by: INTERNAL MEDICINE

## 2022-05-13 ENCOUNTER — PATIENT OUTREACH (OUTPATIENT)
Dept: CASE MANAGEMENT | Age: 87
End: 2022-05-13

## 2022-05-19 DIAGNOSIS — K21.9 GASTROESOPHAGEAL REFLUX DISEASE, UNSPECIFIED WHETHER ESOPHAGITIS PRESENT: ICD-10-CM

## 2022-05-20 RX ORDER — OMEPRAZOLE 20 MG/1
CAPSULE, DELAYED RELEASE ORAL
Qty: 90 CAPSULE | Refills: 1 | OUTPATIENT
Start: 2022-05-20

## 2022-05-20 NOTE — TELEPHONE ENCOUNTER
Duplicate request, previously addressed. Refill on file    Outpatient Medication Detail     Disp Refills Start End    OMEPRAZOLE 20 MG Oral Capsule Delayed Release 90 capsule 1 2/26/2022     Sig: TAKE 1 CAPSULE BY MOUTH BEFORE BREAKFAST EVERY DAY    Sent to pharmacy as: Omeprazole 20 MG Oral Capsule Delayed Release (PRILOSEC)    E-Prescribing Status: Receipt confirmed by pharmacy (2/26/2022  1:53 PM CST)          Pharmacy    [facility] #[id number] - Sarah Ortiz - [address], [phone], [phone]
intact

## 2022-05-24 ENCOUNTER — PATIENT OUTREACH (OUTPATIENT)
Dept: CASE MANAGEMENT | Age: 87
End: 2022-05-24

## 2022-05-24 NOTE — PROGRESS NOTES
Spoke to patient states she is having surgery tomorrow and does not have details about surgery. States it will be done at [facility], advised patient I am not able to see details since Norma uses a different system. Offered to call her surgeon for information. Pt declined states she need to take an incoming call and will call surgeon herself.      Total time -3  min  Total Monthly time- 29 min

## 2022-05-31 ENCOUNTER — APPOINTMENT (OUTPATIENT)
Dept: GENERAL RADIOLOGY | Age: 87
End: 2022-05-31
Payer: MEDICARE

## 2022-05-31 ENCOUNTER — HOSPITAL ENCOUNTER (OUTPATIENT)
Age: 87
Discharge: ANOTHER HOSPITAL | End: 2022-05-31
Payer: MEDICARE

## 2022-05-31 ENCOUNTER — TELEPHONE (OUTPATIENT)
Dept: INTERNAL MEDICINE CLINIC | Facility: CLINIC | Age: 87
End: 2022-05-31

## 2022-05-31 VITALS
BODY MASS INDEX: 36.25 KG/M2 | DIASTOLIC BLOOD PRESSURE: 49 MMHG | HEIGHT: 62 IN | HEART RATE: 89 BPM | OXYGEN SATURATION: 90 % | TEMPERATURE: 98 F | RESPIRATION RATE: 16 BRPM | SYSTOLIC BLOOD PRESSURE: 144 MMHG | WEIGHT: 197 LBS

## 2022-05-31 DIAGNOSIS — R35.0 URINARY FREQUENCY: Primary | ICD-10-CM

## 2022-05-31 DIAGNOSIS — R50.9 FEVER, UNSPECIFIED FEVER CAUSE: ICD-10-CM

## 2022-05-31 DIAGNOSIS — R09.02 HYPOXIA: ICD-10-CM

## 2022-05-31 LAB
BILIRUB UR QL STRIP: NEGATIVE
CLARITY UR: CLEAR
GLUCOSE BLDC GLUCOMTR-MCNC: 133 MG/DL (ref 70–99)
GLUCOSE UR STRIP-MCNC: NEGATIVE MG/DL
HGB UR QL STRIP: NEGATIVE
KETONES UR STRIP-MCNC: NEGATIVE MG/DL
LEUKOCYTE ESTERASE UR QL STRIP: NEGATIVE
NITRITE UR QL STRIP: NEGATIVE
PH UR STRIP: 5 [PH]
PROT UR STRIP-MCNC: NEGATIVE MG/DL
SP GR UR STRIP: 1.01
UROBILINOGEN UR STRIP-ACNC: <2 MG/DL

## 2022-05-31 PROCEDURE — 71046 X-RAY EXAM CHEST 2 VIEWS: CPT

## 2022-05-31 PROCEDURE — 82962 GLUCOSE BLOOD TEST: CPT

## 2022-05-31 PROCEDURE — 81002 URINALYSIS NONAUTO W/O SCOPE: CPT

## 2022-05-31 PROCEDURE — 99214 OFFICE O/P EST MOD 30 MIN: CPT

## 2022-05-31 NOTE — TELEPHONE ENCOUNTER
Got paged earlier by [facility]: pt had fever last nigth 101.2 F and has flank pains, some dysuria. I told N pt should be seen and evaluated and should to at least IC for eval today.

## 2022-05-31 NOTE — ED INITIAL ASSESSMENT (HPI)
Pt had recent surgery of her right shoulder on the 25th of may. Pt had a home health nurse visit. Pt complaining of lower back pain. No pain with urination. Temp of 99 at home. Pt denies any nausea.

## 2022-06-02 NOTE — PROGRESS NOTES
Called the patient to follow-up on her disposition from yesterday. Patient did go to the emergency department and have a CT scan to rule out PE that was negative. Patient is home and is feeling well. I advised the patient that we received the results of her urine culture and she would need to be put on an antibiotic. She asked that a prescription be called into the [facility] in [place]. Please call in a prescription for ciprofloxacin 500 mg twice daily for 7 days, dispense 14, no refills.   Thank you

## 2022-06-02 NOTE — PROGRESS NOTES
Called and I spoke to patient regarding her disposition yesterday. Patient did go to the ED yesterday and had a CT scan to rule out a PE that was negative. She is home and feeling well. I did explain to her that her urine culture grew a bacteria and she would need an antibiotic. Please send prescription for Bactrim /160 p.o. twice daily for 10 days, dispense 20, no refills. I asked the patient to make an appointment to follow-up with her primary care provider next week.

## 2022-06-03 ENCOUNTER — TELEPHONE (OUTPATIENT)
Dept: INTERNAL MEDICINE CLINIC | Facility: CLINIC | Age: 87
End: 2022-06-03

## 2022-06-03 NOTE — TELEPHONE ENCOUNTER
Patient states she had shoulder surgery at [facility] last week. Patient requesting follow up appointment. Appointment scheduled for Monday 6/6/22.     Future Appointments   Date Time Provider Adalgisa Viramontes   6/6/2022  2:15 PM MD G[facility]

## 2022-06-06 ENCOUNTER — OFFICE VISIT (OUTPATIENT)
Dept: INTERNAL MEDICINE CLINIC | Facility: CLINIC | Age: 87
End: 2022-06-06
Payer: MEDICARE

## 2022-06-06 VITALS
SYSTOLIC BLOOD PRESSURE: 113 MMHG | WEIGHT: 195 LBS | HEIGHT: 62 IN | HEART RATE: 86 BPM | DIASTOLIC BLOOD PRESSURE: 63 MMHG | OXYGEN SATURATION: 96 % | BODY MASS INDEX: 35.88 KG/M2

## 2022-06-06 DIAGNOSIS — I25.10 CORONARY ARTERIOSCLEROSIS IN NATIVE ARTERY: ICD-10-CM

## 2022-06-06 DIAGNOSIS — E78.5 HYPERLIPIDEMIA ASSOCIATED WITH TYPE 2 DIABETES MELLITUS (HCC): ICD-10-CM

## 2022-06-06 DIAGNOSIS — E11.59 HYPERTENSION ASSOCIATED WITH TYPE 2 DIABETES MELLITUS (HCC): ICD-10-CM

## 2022-06-06 DIAGNOSIS — M06.9 RHEUMATOID ARTHRITIS INVOLVING MULTIPLE SITES, UNSPECIFIED WHETHER RHEUMATOID FACTOR PRESENT (HCC): ICD-10-CM

## 2022-06-06 DIAGNOSIS — E11.69 HYPERLIPIDEMIA ASSOCIATED WITH TYPE 2 DIABETES MELLITUS (HCC): ICD-10-CM

## 2022-06-06 DIAGNOSIS — I15.2 HYPERTENSION ASSOCIATED WITH TYPE 2 DIABETES MELLITUS (HCC): ICD-10-CM

## 2022-06-06 DIAGNOSIS — N30.00 ACUTE CYSTITIS WITHOUT HEMATURIA: ICD-10-CM

## 2022-06-06 DIAGNOSIS — E11.9 CONTROLLED TYPE 2 DIABETES MELLITUS WITHOUT COMPLICATION, WITHOUT LONG-TERM CURRENT USE OF INSULIN (HCC): ICD-10-CM

## 2022-06-06 DIAGNOSIS — Z96.611 STATUS POST REPLACEMENT OF RIGHT SHOULDER JOINT: Primary | ICD-10-CM

## 2022-06-06 LAB
CARTRIDGE LOT#: 963 NUMERIC
HEMOGLOBIN A1C: 5.6 % (ref 4.3–5.6)

## 2022-06-06 PROCEDURE — 1111F DSCHRG MED/CURRENT MED MERGE: CPT | Performed by: INTERNAL MEDICINE

## 2022-06-06 PROCEDURE — 99214 OFFICE O/P EST MOD 30 MIN: CPT | Performed by: INTERNAL MEDICINE

## 2022-06-06 PROCEDURE — 83036 HEMOGLOBIN GLYCOSYLATED A1C: CPT | Performed by: INTERNAL MEDICINE

## 2022-06-06 PROCEDURE — 1126F AMNT PAIN NOTED NONE PRSNT: CPT | Performed by: INTERNAL MEDICINE

## 2022-06-06 RX ORDER — CIPROFLOXACIN 500 MG/1
500 TABLET, FILM COATED ORAL 2 TIMES DAILY
COMMUNITY
Start: 2022-06-02

## 2022-06-06 RX ORDER — HYDROCODONE BITARTRATE AND ACETAMINOPHEN 5; 325 MG/1; MG/1
1-2 TABLET ORAL
COMMUNITY
Start: 2022-05-28

## 2022-06-07 ENCOUNTER — PATIENT OUTREACH (OUTPATIENT)
Dept: CASE MANAGEMENT | Age: 87
End: 2022-06-07

## 2022-06-14 RX ORDER — EZETIMIBE 10 MG/1
10 TABLET ORAL NIGHTLY
Qty: 90 TABLET | Refills: 1 | Status: SHIPPED | OUTPATIENT
Start: 2022-06-14

## 2022-06-14 RX ORDER — GABAPENTIN 300 MG/1
300 CAPSULE ORAL 3 TIMES DAILY
Qty: 270 CAPSULE | Refills: 1 | Status: SHIPPED | OUTPATIENT
Start: 2022-06-14

## 2022-06-15 RX ORDER — ALPRAZOLAM 0.25 MG/1
TABLET ORAL
Qty: 30 TABLET | Refills: 0 | Status: SHIPPED | OUTPATIENT
Start: 2022-06-15 | End: 2022-07-15

## 2022-06-15 NOTE — TELEPHONE ENCOUNTER
Please review refill protocol failed/ no protocol  Requested Prescriptions   Pending Prescriptions Disp Refills    ALPRAZOLAM 0.25 MG Oral Tab [Pharmacy Med Name: ALPRAZOLAM 0.25 MG TABLET] 30 tablet 1     Sig: TAKE 1 TABLET BY MOUTH EVERY DAY AS NEEDED FOR ANXIETY        There is no refill protocol information for this order

## 2022-06-27 ENCOUNTER — TELEPHONE (OUTPATIENT)
Dept: INTERNAL MEDICINE CLINIC | Facility: CLINIC | Age: 87
End: 2022-06-27

## 2022-06-27 ENCOUNTER — PATIENT OUTREACH (OUTPATIENT)
Dept: CASE MANAGEMENT | Age: 87
End: 2022-06-27

## 2022-06-27 DIAGNOSIS — I15.2 HYPERTENSION ASSOCIATED WITH TYPE 2 DIABETES MELLITUS (HCC): ICD-10-CM

## 2022-06-27 DIAGNOSIS — I73.9 PERIPHERAL VASCULAR DISEASE (HCC): ICD-10-CM

## 2022-06-27 DIAGNOSIS — E78.5 HYPERLIPIDEMIA ASSOCIATED WITH TYPE 2 DIABETES MELLITUS (HCC): ICD-10-CM

## 2022-06-27 DIAGNOSIS — E11.59 HYPERTENSION ASSOCIATED WITH TYPE 2 DIABETES MELLITUS (HCC): ICD-10-CM

## 2022-06-27 DIAGNOSIS — E11.69 HYPERLIPIDEMIA ASSOCIATED WITH TYPE 2 DIABETES MELLITUS (HCC): ICD-10-CM

## 2022-06-27 DIAGNOSIS — F41.1 GENERALIZED ANXIETY DISORDER: ICD-10-CM

## 2022-06-27 DIAGNOSIS — I25.10 CORONARY ARTERIOSCLEROSIS IN NATIVE ARTERY: ICD-10-CM

## 2022-06-27 DIAGNOSIS — E66.01 MORBID (SEVERE) OBESITY DUE TO EXCESS CALORIES (HCC): ICD-10-CM

## 2022-06-27 DIAGNOSIS — E11.9 CONTROLLED TYPE 2 DIABETES MELLITUS WITHOUT COMPLICATION, WITHOUT LONG-TERM CURRENT USE OF INSULIN (HCC): ICD-10-CM

## 2022-06-27 NOTE — TELEPHONE ENCOUNTER
Fax received from [employer]  Prescription/Certification of Medical necessity. For incontinence supplies placed in Dr. Malcolm Fernandez in basket to complete.

## 2022-07-05 ENCOUNTER — NURSE TRIAGE (OUTPATIENT)
Dept: INTERNAL MEDICINE CLINIC | Facility: CLINIC | Age: 87
End: 2022-07-05

## 2022-07-05 DIAGNOSIS — N30.00 ACUTE CYSTITIS WITHOUT HEMATURIA: Primary | ICD-10-CM

## 2022-07-05 RX ORDER — CIPROFLOXACIN 250 MG/1
250 TABLET, FILM COATED ORAL 2 TIMES DAILY
Qty: 14 TABLET | Refills: 0 | Status: SHIPPED | OUTPATIENT
Start: 2022-07-05

## 2022-07-05 NOTE — TELEPHONE ENCOUNTER
Ok to start cipro 250mg po bid for one week; I want her to do urinalysis and urine culture a week after done with abx to make sure uti has resolved. Call pback if symptoms persist/worsens.

## 2022-07-11 ENCOUNTER — PATIENT OUTREACH (OUTPATIENT)
Dept: CASE MANAGEMENT | Age: 87
End: 2022-07-11

## 2022-07-11 ENCOUNTER — TELEPHONE (OUTPATIENT)
Dept: INTERNAL MEDICINE CLINIC | Facility: CLINIC | Age: 87
End: 2022-07-11

## 2022-07-11 DIAGNOSIS — E11.9 CONTROLLED TYPE 2 DIABETES MELLITUS WITHOUT COMPLICATION, WITHOUT LONG-TERM CURRENT USE OF INSULIN (HCC): ICD-10-CM

## 2022-07-11 DIAGNOSIS — E11.69 HYPERLIPIDEMIA ASSOCIATED WITH TYPE 2 DIABETES MELLITUS (HCC): ICD-10-CM

## 2022-07-11 DIAGNOSIS — E78.5 HYPERLIPIDEMIA ASSOCIATED WITH TYPE 2 DIABETES MELLITUS (HCC): ICD-10-CM

## 2022-07-11 DIAGNOSIS — I25.10 CORONARY ARTERIOSCLEROSIS IN NATIVE ARTERY: ICD-10-CM

## 2022-07-11 DIAGNOSIS — F41.1 GENERALIZED ANXIETY DISORDER: ICD-10-CM

## 2022-07-11 DIAGNOSIS — E11.59 HYPERTENSION ASSOCIATED WITH TYPE 2 DIABETES MELLITUS (HCC): ICD-10-CM

## 2022-07-11 DIAGNOSIS — I15.2 HYPERTENSION ASSOCIATED WITH TYPE 2 DIABETES MELLITUS (HCC): ICD-10-CM

## 2022-07-11 NOTE — TELEPHONE ENCOUNTER
States she has noted increased bruising on her left and right arm. This has been onset for the past 6 months, however has forgotten to mention it to her providers. She is not sure what is causing bruising. Wonders if its associated to her wearing a bracelet and watch on wrist. Reports [facility] caregiver asked her today if she is being abused and she indicated she is not. Reviewed patient med list she is on Aspirin and Gabapentin. Pt is questioning what might be causing for her to have purple patches that look like bruises on her arms?

## 2022-07-11 NOTE — TELEPHONE ENCOUNTER
Its likely the aspirin that causes bruising; she needs to take it though, also age factor, which leads to thinner skin and increase inicidence of bruising.

## 2022-07-15 RX ORDER — ALPRAZOLAM 0.25 MG/1
TABLET ORAL
Qty: 30 TABLET | Refills: 0 | Status: SHIPPED | OUTPATIENT
Start: 2022-07-15

## 2022-08-01 DIAGNOSIS — K21.9 GASTROESOPHAGEAL REFLUX DISEASE, UNSPECIFIED WHETHER ESOPHAGITIS PRESENT: ICD-10-CM

## 2022-08-01 RX ORDER — OMEPRAZOLE 20 MG/1
CAPSULE, DELAYED RELEASE ORAL
Qty: 90 CAPSULE | Refills: 1 | Status: SHIPPED | OUTPATIENT
Start: 2022-08-01

## 2022-08-04 ENCOUNTER — TELEPHONE (OUTPATIENT)
Dept: INTERNAL MEDICINE CLINIC | Facility: CLINIC | Age: 87
End: 2022-08-04

## 2022-08-04 NOTE — TELEPHONE ENCOUNTER
Prescription/certificate of medical necessity from [employer] received, placed on Dr Rose Espinosa desk for review and signature.

## 2022-08-17 RX ORDER — ALPRAZOLAM 0.25 MG/1
TABLET ORAL
Qty: 30 TABLET | Refills: 0 | Status: SHIPPED | OUTPATIENT
Start: 2022-08-17

## 2022-08-23 ENCOUNTER — PATIENT OUTREACH (OUTPATIENT)
Dept: CASE MANAGEMENT | Age: 87
End: 2022-08-23

## 2022-08-23 DIAGNOSIS — I15.2 HYPERTENSION ASSOCIATED WITH TYPE 2 DIABETES MELLITUS (HCC): ICD-10-CM

## 2022-08-23 DIAGNOSIS — I25.10 CORONARY ARTERIOSCLEROSIS IN NATIVE ARTERY: ICD-10-CM

## 2022-08-23 DIAGNOSIS — Z91.81 RISK FOR FALLS: ICD-10-CM

## 2022-08-23 DIAGNOSIS — E11.9 CONTROLLED TYPE 2 DIABETES MELLITUS WITHOUT COMPLICATION, WITHOUT LONG-TERM CURRENT USE OF INSULIN (HCC): ICD-10-CM

## 2022-08-23 DIAGNOSIS — E11.69 HYPERLIPIDEMIA ASSOCIATED WITH TYPE 2 DIABETES MELLITUS (HCC): ICD-10-CM

## 2022-08-23 DIAGNOSIS — F41.1 GENERALIZED ANXIETY DISORDER: ICD-10-CM

## 2022-08-23 DIAGNOSIS — E11.59 HYPERTENSION ASSOCIATED WITH TYPE 2 DIABETES MELLITUS (HCC): ICD-10-CM

## 2022-08-23 DIAGNOSIS — E78.5 HYPERLIPIDEMIA ASSOCIATED WITH TYPE 2 DIABETES MELLITUS (HCC): ICD-10-CM

## 2022-08-23 DIAGNOSIS — M06.9 RHEUMATOID ARTHRITIS INVOLVING MULTIPLE SITES, UNSPECIFIED WHETHER RHEUMATOID FACTOR PRESENT (HCC): ICD-10-CM

## 2022-09-13 ENCOUNTER — NURSE TRIAGE (OUTPATIENT)
Dept: INTERNAL MEDICINE CLINIC | Facility: CLINIC | Age: 87
End: 2022-09-13

## 2022-09-13 ENCOUNTER — PATIENT OUTREACH (OUTPATIENT)
Dept: CASE MANAGEMENT | Age: 87
End: 2022-09-13

## 2022-09-13 DIAGNOSIS — E11.9 CONTROLLED TYPE 2 DIABETES MELLITUS WITHOUT COMPLICATION, WITHOUT LONG-TERM CURRENT USE OF INSULIN (HCC): ICD-10-CM

## 2022-09-13 DIAGNOSIS — F41.1 GENERALIZED ANXIETY DISORDER: ICD-10-CM

## 2022-09-13 DIAGNOSIS — I15.2 HYPERTENSION ASSOCIATED WITH TYPE 2 DIABETES MELLITUS (HCC): ICD-10-CM

## 2022-09-13 DIAGNOSIS — E11.69 HYPERLIPIDEMIA ASSOCIATED WITH TYPE 2 DIABETES MELLITUS (HCC): ICD-10-CM

## 2022-09-13 DIAGNOSIS — E11.59 HYPERTENSION ASSOCIATED WITH TYPE 2 DIABETES MELLITUS (HCC): ICD-10-CM

## 2022-09-13 DIAGNOSIS — I25.10 CORONARY ARTERIOSCLEROSIS IN NATIVE ARTERY: ICD-10-CM

## 2022-09-13 DIAGNOSIS — E78.5 HYPERLIPIDEMIA ASSOCIATED WITH TYPE 2 DIABETES MELLITUS (HCC): ICD-10-CM

## 2022-09-14 ENCOUNTER — VIRTUAL PHONE E/M (OUTPATIENT)
Dept: INTERNAL MEDICINE CLINIC | Facility: CLINIC | Age: 87
End: 2022-09-14
Payer: MEDICARE

## 2022-09-14 DIAGNOSIS — U07.1 COVID-19 VIRUS DETECTED: Primary | ICD-10-CM

## 2022-09-14 PROCEDURE — 99442 PHONE E/M BY PHYS 11-20 MIN: CPT | Performed by: INTERNAL MEDICINE

## 2022-09-19 ENCOUNTER — TELEPHONE (OUTPATIENT)
Dept: INTERNAL MEDICINE CLINIC | Facility: CLINIC | Age: 87
End: 2022-09-19

## 2022-09-19 ENCOUNTER — PATIENT OUTREACH (OUTPATIENT)
Dept: CASE MANAGEMENT | Age: 87
End: 2022-09-19

## 2022-09-19 RX ORDER — GLIPIZIDE 5 MG/1
5 TABLET, FILM COATED, EXTENDED RELEASE ORAL
Qty: 30 TABLET | Refills: 0 | Status: SHIPPED | OUTPATIENT
Start: 2022-09-19 | End: 2022-09-26

## 2022-09-19 NOTE — PROGRESS NOTES
States her BS is 325 she feels completely disgusted. She has increased appetite as a side effect of Prednisone. She has been off medication for 8 days as of today. Voiced increased anxiety. Pt contacted triage she is currently waiting for further instructions from Dr. Dejah Crowley.    I validated patients feelings and concerns. Provided support.  Advised patient to wait for further instructions from Dr. Dejah Crowley.

## 2022-09-19 NOTE — TELEPHONE ENCOUNTER
Please review High drug interactions thank you: Allergy/Contraindication: glipiZIDE ER  Reactions: SWELLING. No reaction type specified. User documented allergy severity: High. Level 3 with SULFA ANTIBIOTICS. Allergy/Contraindication: glipiZIDE ER  Reactions: ITCHING. No reaction type specified. User documented allergy severity: None specified. Level 3 with BACTRIM (Class: SULFA ANTIBIOTICS). Drug-Drug: glipiZIDE ER and ciprofloxacin  The hypoglycemic effect of Sulfonylureas may be increased by Quinolones especially in elderly patients with renal compromise. Hypoglycemia symptoms including lightheadedness, diaphoresis, tachycardia and various neurologic and psychiatric disturbances may occur.

## 2022-09-19 NOTE — TELEPHONE ENCOUNTER
Spoke with patient, she was not given diabetic medication upon discharge. Relayed message and verbalizes understanding of instructions.   Pharmacy verified

## 2022-09-19 NOTE — TELEPHONE ENCOUNTER
Patient calling with condition update:    States past few days \"feeling so crappy, I feel ugly\" and \"shaky\". She checked her blood sugar and was shocked it was 325. She has been off her diabetic medications due to maintaining her A1C    She completed steroids 9/11/22 following hospitalization for covid. Says \"I'm eating like a pig\". Informed steroids may raise blood sugar and increase appetite. Denies increased thirst or urinary frequency    Had telehealth follow-up with Dr. Compa Short 9/14/22. She is anxious about her blood sugar reading, and seeking advice. Advised to increase water intake, however she was recently on water pills for \"fluid around the heart\".   She denies chest pain or shortness of breath, speaking full clear sentences during call

## 2022-09-19 NOTE — TELEPHONE ENCOUNTER
Was she sent home from hospital with diabetic medicine?  If not, she will need to go back on  glipizide ER  5mg po q am with breakfast #90 to help control her glucose. ; monitor glucose daily before breakfast  And call us back in one week for glucose readings

## 2022-09-20 NOTE — TELEPHONE ENCOUNTER
Patient called and wants to follow up her glipizide prescription, states her blood sugar before breakfast this morning was 311, asymptomatic. Informed that per our record, it was sent to [facility] yesterday, but per patient,she called them and was told that they never received any prescription. Informed patient that RN will call her pharmacy and will call her back,contact number verified. Instructed to push fluids in the meantime. Called [facility] and spoke with Augusta Arreola, confirmed that they had received the Glipizide prescription , instructed to prepare it now and patient will pick it up. Informed patient above informations, patient is happy and thankful. Yvette Phillips

## 2022-09-20 NOTE — TELEPHONE ENCOUNTER
Pt had been on glipizide before for several years without reaction. She is no longer on cipro, was given back in JUly 2022 only; erx sent.

## 2022-09-26 RX ORDER — GLIPIZIDE 5 MG/1
TABLET, FILM COATED, EXTENDED RELEASE ORAL
Qty: 30 TABLET | Refills: 0 | Status: SHIPPED | OUTPATIENT
Start: 2022-09-26

## 2022-09-26 NOTE — TELEPHONE ENCOUNTER
LR #30 sent 9/19/22  Refill passed per [facility] protocol.       Requested Prescriptions   Pending Prescriptions Disp Refills    GLIPIZIDE ER 5 MG Oral Tablet 24 Hr [Pharmacy Med Name: GLIPIZIDE ER 5 MG TABLET] 30 tablet 0     Sig: TAKE 1 TABLET BY MOUTH EVERY DAY WITH BREAKFAST        Diabetes Medication Protocol Passed - 9/25/2022 11:52 AM        Passed - Last A1C < 7.5 and within past 6 months     Lab Results   Component Value Date    A1C 5.6 06/06/2022               Passed - In person appointment or virtual visit in the past 6 mos or appointment in next 3 mos       Recent Outpatient Visits              1 week ago COVID-19 virus detected    Kayleen Rooney MD    Virtual Phone E/M    3 months ago Status post replacement of right shoulder joint    Renata Rooney MD    Office Visit    4 months ago Preop general physical exam    [facility], Renata Sawyer MD    Office Visit    7 months ago Non-seasonal allergic rhinitis, unspecified trigger    [facility] Lendel Magic, MD    Office Visit    8 months ago [facility] annual wellness visit, subsequent    [facility] Shaw, Renata Sawyer MD    Office Visit                 Passed - EGFRCR or GFRNAA > 50     GFR Evaluation  GFRNAA: 76 , resulted on 3/18/2022            Passed - GFR in the past 12 months                   Recent Outpatient Visits              1 week ago COVID-19 virus detected    [facility] Thomasine Deis, MD    Virtual Phone E/M    3 months ago Status post replacement of right shoulder joint    [facility] Lendel Magic, MD    Office Visit    4 months ago Preop general physical exam    [facility] Lendel Magic, MD    Office Visit    7 months ago Non-seasonal allergic rhinitis, unspecified trigger    [address] MD    Office Visit    8 months ago [employer] annual wellness visit, subsequent    [address] MD    Office Visit

## 2022-09-28 RX ORDER — ALPRAZOLAM 0.25 MG/1
TABLET ORAL
Qty: 30 TABLET | Refills: 0 | Status: SHIPPED | OUTPATIENT
Start: 2022-09-28

## 2022-10-04 ENCOUNTER — NURSE TRIAGE (OUTPATIENT)
Dept: INTERNAL MEDICINE CLINIC | Facility: CLINIC | Age: 87
End: 2022-10-04

## 2022-10-04 NOTE — TELEPHONE ENCOUNTER
Can she do ua and urine culture? We had treated her last probable uti empircially without ua then. Since she is having recurent symptoms, need to know specifically if she has uti and what bacteria it is.

## 2022-10-04 NOTE — TELEPHONE ENCOUNTER
Spoke with patient ( verified name and  ) , advised DR Silver Swann 's note and verbalized understanding. States that she does not drive and no transportation. Explained about antibiotic resistance. Patient will asks someone to drive her and do the test.  Instructed to get the UA sterile cup and use it for the urine specimen. Patient stated understanding. Order is in the system since 22. Please follow up  tomorrow if patient did the UA,thanks.          Please reply to pool: DONNA Cowan

## 2022-10-05 NOTE — TELEPHONE ENCOUNTER
Spoke with Pt. Verified name and . Pt states she was not able to drop off specimen as she did not have transportation. Pt states she will try today.     Please follow-up

## 2022-10-07 NOTE — TELEPHONE ENCOUNTER
Spoke with pt and she has been unable to get a ride to the lab. Pt states she may be able today. Advised pt if unable to call back and we can advise Dr Tory Carver and get his recommendations.   Pt agrees to plan

## 2022-10-10 ENCOUNTER — LAB ENCOUNTER (OUTPATIENT)
Dept: LAB | Age: 87
End: 2022-10-10
Attending: INTERNAL MEDICINE
Payer: MEDICARE

## 2022-10-10 DIAGNOSIS — N30.00 ACUTE CYSTITIS WITHOUT HEMATURIA: ICD-10-CM

## 2022-10-10 DIAGNOSIS — Z00.00 MEDICARE ANNUAL WELLNESS VISIT, SUBSEQUENT: ICD-10-CM

## 2022-10-10 DIAGNOSIS — E78.5 HYPERLIPIDEMIA ASSOCIATED WITH TYPE 2 DIABETES MELLITUS (HCC): ICD-10-CM

## 2022-10-10 DIAGNOSIS — E11.69 HYPERLIPIDEMIA ASSOCIATED WITH TYPE 2 DIABETES MELLITUS (HCC): ICD-10-CM

## 2022-10-10 DIAGNOSIS — E11.9 CONTROLLED TYPE 2 DIABETES MELLITUS WITHOUT COMPLICATION, WITHOUT LONG-TERM CURRENT USE OF INSULIN (HCC): ICD-10-CM

## 2022-10-10 LAB
ALBUMIN SERPL-MCNC: 3.3 G/DL (ref 3.4–5)
ALBUMIN/GLOB SERPL: 1 {RATIO} (ref 1–2)
ALP LIVER SERPL-CCNC: 109 U/L
ALT SERPL-CCNC: 18 U/L
ANION GAP SERPL CALC-SCNC: 7 MMOL/L (ref 0–18)
AST SERPL-CCNC: 20 U/L (ref 15–37)
BASOPHILS # BLD AUTO: 0.09 X10(3) UL (ref 0–0.2)
BASOPHILS NFR BLD AUTO: 1 %
BILIRUB SERPL-MCNC: 1 MG/DL (ref 0.1–2)
BILIRUB UR QL: NEGATIVE
BUN BLD-MCNC: 18 MG/DL (ref 7–18)
BUN/CREAT SERPL: 20.5 (ref 10–20)
CALCIUM BLD-MCNC: 9.1 MG/DL (ref 8.5–10.1)
CHLORIDE SERPL-SCNC: 110 MMOL/L (ref 98–112)
CHOLEST SERPL-MCNC: 148 MG/DL (ref ?–200)
CO2 SERPL-SCNC: 26 MMOL/L (ref 21–32)
COLOR UR: YELLOW
CREAT BLD-MCNC: 0.88 MG/DL
CREAT UR-SCNC: 81.6 MG/DL
DEPRECATED RDW RBC AUTO: 55.7 FL (ref 35.1–46.3)
EOSINOPHIL # BLD AUTO: 0.14 X10(3) UL (ref 0–0.7)
EOSINOPHIL NFR BLD AUTO: 1.6 %
ERYTHROCYTE [DISTWIDTH] IN BLOOD BY AUTOMATED COUNT: 20 % (ref 11–15)
EST. AVERAGE GLUCOSE BLD GHB EST-MCNC: 140 MG/DL (ref 68–126)
FASTING PATIENT LIPID ANSWER: NO
FASTING STATUS PATIENT QL REPORTED: NO
GFR SERPLBLD BASED ON 1.73 SQ M-ARVRAT: 64 ML/MIN/1.73M2 (ref 60–?)
GLOBULIN PLAS-MCNC: 3.3 G/DL (ref 2.8–4.4)
GLUCOSE BLD-MCNC: 114 MG/DL (ref 70–99)
GLUCOSE UR-MCNC: NEGATIVE MG/DL
HBA1C MFR BLD: 6.5 % (ref ?–5.7)
HCT VFR BLD AUTO: 41 %
HDLC SERPL-MCNC: 46 MG/DL (ref 40–59)
HGB BLD-MCNC: 12.7 G/DL
IMM GRANULOCYTES # BLD AUTO: 0.02 X10(3) UL (ref 0–1)
IMM GRANULOCYTES NFR BLD: 0.2 %
KETONES UR-MCNC: NEGATIVE MG/DL
LDLC SERPL CALC-MCNC: 76 MG/DL (ref ?–100)
LYMPHOCYTES # BLD AUTO: 2.41 X10(3) UL (ref 1–4)
LYMPHOCYTES NFR BLD AUTO: 27.2 %
MCH RBC QN AUTO: 25.1 PG (ref 26–34)
MCHC RBC AUTO-ENTMCNC: 31 G/DL (ref 31–37)
MCV RBC AUTO: 81.2 FL
MICROALBUMIN UR-MCNC: 14.2 MG/DL
MICROALBUMIN/CREAT 24H UR-RTO: 174 UG/MG (ref ?–30)
MONOCYTES # BLD AUTO: 0.34 X10(3) UL (ref 0.1–1)
MONOCYTES NFR BLD AUTO: 3.8 %
NEUTROPHILS # BLD AUTO: 5.85 X10 (3) UL (ref 1.5–7.7)
NEUTROPHILS # BLD AUTO: 5.85 X10(3) UL (ref 1.5–7.7)
NEUTROPHILS NFR BLD AUTO: 66.2 %
NITRITE UR QL STRIP.AUTO: POSITIVE
NONHDLC SERPL-MCNC: 102 MG/DL (ref ?–130)
OSMOLALITY SERPL CALC.SUM OF ELEC: 299 MOSM/KG (ref 275–295)
PH UR: 6 [PH] (ref 5–8)
PLATELET # BLD AUTO: 299 10(3)UL (ref 150–450)
POTASSIUM SERPL-SCNC: 4.2 MMOL/L (ref 3.5–5.1)
PROT SERPL-MCNC: 6.6 G/DL (ref 6.4–8.2)
PROT UR-MCNC: 100 MG/DL
RBC # BLD AUTO: 5.05 X10(6)UL
RBC #/AREA URNS AUTO: >10 /HPF
SODIUM SERPL-SCNC: 143 MMOL/L (ref 136–145)
SP GR UR STRIP: 1.01 (ref 1–1.03)
TRIGL SERPL-MCNC: 150 MG/DL (ref 30–149)
UROBILINOGEN UR STRIP-ACNC: <2
VIT C UR-MCNC: 20 MG/DL
VLDLC SERPL CALC-MCNC: 23 MG/DL (ref 0–30)
WBC # BLD AUTO: 8.9 X10(3) UL (ref 4–11)
WBC #/AREA URNS AUTO: >50 /HPF
WBC CLUMPS UR QL AUTO: PRESENT /HPF

## 2022-10-10 PROCEDURE — 36415 COLL VENOUS BLD VENIPUNCTURE: CPT

## 2022-10-10 PROCEDURE — 83036 HEMOGLOBIN GLYCOSYLATED A1C: CPT

## 2022-10-10 PROCEDURE — 87186 SC STD MICRODIL/AGAR DIL: CPT

## 2022-10-10 PROCEDURE — 85025 COMPLETE CBC W/AUTO DIFF WBC: CPT

## 2022-10-10 PROCEDURE — 82570 ASSAY OF URINE CREATININE: CPT

## 2022-10-10 PROCEDURE — 87086 URINE CULTURE/COLONY COUNT: CPT

## 2022-10-10 PROCEDURE — 82043 UR ALBUMIN QUANTITATIVE: CPT

## 2022-10-10 PROCEDURE — 87088 URINE BACTERIA CULTURE: CPT

## 2022-10-10 PROCEDURE — 81001 URINALYSIS AUTO W/SCOPE: CPT

## 2022-10-10 PROCEDURE — 80053 COMPREHEN METABOLIC PANEL: CPT

## 2022-10-10 PROCEDURE — 80061 LIPID PANEL: CPT

## 2022-10-11 ENCOUNTER — TELEPHONE (OUTPATIENT)
Dept: INTERNAL MEDICINE CLINIC | Facility: CLINIC | Age: 87
End: 2022-10-11

## 2022-10-12 RX ORDER — NITROFURANTOIN 25; 75 MG/1; MG/1
100 CAPSULE ORAL 2 TIMES DAILY
Qty: 14 CAPSULE | Refills: 0 | Status: SHIPPED | OUTPATIENT
Start: 2022-10-12

## 2022-10-12 NOTE — TELEPHONE ENCOUNTER
We just got the final urine culture result; she has uti and I sent erx for antibiotic for her uti. See rest of lab result comment in lab in epic.

## 2022-10-12 NOTE — TELEPHONE ENCOUNTER
Spoke with pt, verified , informed pt of results and recommendations, pt verbalized understanding and will  antibiotic later today.

## 2022-10-24 ENCOUNTER — PATIENT OUTREACH (OUTPATIENT)
Dept: CASE MANAGEMENT | Age: 87
End: 2022-10-24

## 2022-10-24 DIAGNOSIS — E11.69 HYPERLIPIDEMIA ASSOCIATED WITH TYPE 2 DIABETES MELLITUS (HCC): ICD-10-CM

## 2022-10-24 DIAGNOSIS — E11.59 HYPERTENSION ASSOCIATED WITH TYPE 2 DIABETES MELLITUS (HCC): ICD-10-CM

## 2022-10-24 DIAGNOSIS — I25.10 CORONARY ARTERIOSCLEROSIS IN NATIVE ARTERY: ICD-10-CM

## 2022-10-24 DIAGNOSIS — E11.9 CONTROLLED TYPE 2 DIABETES MELLITUS WITHOUT COMPLICATION, WITHOUT LONG-TERM CURRENT USE OF INSULIN (HCC): ICD-10-CM

## 2022-10-24 DIAGNOSIS — I15.2 HYPERTENSION ASSOCIATED WITH TYPE 2 DIABETES MELLITUS (HCC): ICD-10-CM

## 2022-10-24 DIAGNOSIS — F41.1 GENERALIZED ANXIETY DISORDER: ICD-10-CM

## 2022-10-24 DIAGNOSIS — E78.5 HYPERLIPIDEMIA ASSOCIATED WITH TYPE 2 DIABETES MELLITUS (HCC): ICD-10-CM

## 2022-10-30 RX ORDER — GLIPIZIDE 5 MG/1
5 TABLET, FILM COATED, EXTENDED RELEASE ORAL
Qty: 90 TABLET | Refills: 1 | Status: SHIPPED | OUTPATIENT
Start: 2022-10-30

## 2022-10-31 ENCOUNTER — TELEPHONE (OUTPATIENT)
Dept: INTERNAL MEDICINE CLINIC | Facility: CLINIC | Age: 87
End: 2022-10-31

## 2022-10-31 NOTE — TELEPHONE ENCOUNTER
[employer] prescription/certificate of medical necessity for underpads. Placed on Dr. Eric Gan desk for signature and review.

## 2022-11-02 ENCOUNTER — TELEPHONE (OUTPATIENT)
Dept: INTERNAL MEDICINE CLINIC | Facility: CLINIC | Age: 87
End: 2022-11-02

## 2022-11-02 ENCOUNTER — PATIENT OUTREACH (OUTPATIENT)
Dept: CASE MANAGEMENT | Age: 87
End: 2022-11-02

## 2022-11-02 ENCOUNTER — MED REC SCAN ONLY (OUTPATIENT)
Dept: INTERNAL MEDICINE CLINIC | Facility: CLINIC | Age: 87
End: 2022-11-02

## 2022-11-02 DIAGNOSIS — E11.59 HYPERTENSION ASSOCIATED WITH TYPE 2 DIABETES MELLITUS (HCC): ICD-10-CM

## 2022-11-02 DIAGNOSIS — F41.1 GENERALIZED ANXIETY DISORDER: ICD-10-CM

## 2022-11-02 DIAGNOSIS — E78.5 HYPERLIPIDEMIA ASSOCIATED WITH TYPE 2 DIABETES MELLITUS (HCC): ICD-10-CM

## 2022-11-02 DIAGNOSIS — I15.2 HYPERTENSION ASSOCIATED WITH TYPE 2 DIABETES MELLITUS (HCC): ICD-10-CM

## 2022-11-02 DIAGNOSIS — E11.69 HYPERLIPIDEMIA ASSOCIATED WITH TYPE 2 DIABETES MELLITUS (HCC): ICD-10-CM

## 2022-11-02 RX ORDER — CIPROFLOXACIN 250 MG/1
250 TABLET, FILM COATED ORAL 2 TIMES DAILY
Qty: 14 TABLET | Refills: 0 | Status: SHIPPED | OUTPATIENT
Start: 2022-11-02 | End: 2022-11-09

## 2022-11-02 NOTE — TELEPHONE ENCOUNTER
Spoke to patient and relayed Dr. Richie Galan message below. Patient verbalized understanding. Placed order per written order below.

## 2022-11-02 NOTE — TELEPHONE ENCOUNTER
Report to KODI Mercer.  Pt and family updated of status Start pt on cipro 250mg po bid for one week; she will need to monitor her glucose closely since cipro will interact with her glipizide and cause sugar to go down too low. She is allergic to many antibiotics and this is the only one I can use since she didn't improving with macrobid .

## 2022-11-04 RX ORDER — ALPRAZOLAM 0.25 MG/1
TABLET ORAL
Qty: 30 TABLET | Refills: 0 | Status: SHIPPED | OUTPATIENT
Start: 2022-11-04

## 2022-11-04 NOTE — TELEPHONE ENCOUNTER
Please review. Protocol failed/ No protocol.     Requested Prescriptions   Pending Prescriptions Disp Refills    ALPRAZOLAM 0.25 MG Oral Tab [Pharmacy Med Name: ALPRAZOLAM 0.25 MG TABLET] 30 tablet 0     Sig: TAKE 1 TABLET BY MOUTH EVERY DAY AS NEEDED FOR ANXIETY       There is no refill protocol information for this order          Recent Outpatient Visits              1 month ago COVID-19 virus detected    [facility] Sylvia Toledo [facility]    Virtual Phone E/M    5 months ago Status post replacement of right shoulder joint    [facility], Jerjose Gill, Shubham Aquino MD    Office Visit    6 months ago Preop general physical exam    [facility]    Office Visit    9 months ago Non-seasonal allergic rhinitis, unspecified trigger    [facility], JerShubham zhong MD    Office Visit    9 months ago [facility] annual wellness visit, subsequent    [place] [facility] [employer]    Office Visit            Future Appointments         Provider Department Appt Notes    In 2 months Gatito Luciano MD [place] [employer] [facility] [employer] [facility] [employer] [facility], [place] [employer] px needs a Sat lacy due to transportation

## 2022-12-06 ENCOUNTER — PATIENT OUTREACH (OUTPATIENT)
Dept: CASE MANAGEMENT | Age: 87
End: 2022-12-06

## 2022-12-06 DIAGNOSIS — E78.5 HYPERLIPIDEMIA ASSOCIATED WITH TYPE 2 DIABETES MELLITUS (HCC): ICD-10-CM

## 2022-12-06 DIAGNOSIS — E11.59 HYPERTENSION ASSOCIATED WITH TYPE 2 DIABETES MELLITUS (HCC): ICD-10-CM

## 2022-12-06 DIAGNOSIS — E11.69 HYPERLIPIDEMIA ASSOCIATED WITH TYPE 2 DIABETES MELLITUS (HCC): ICD-10-CM

## 2022-12-06 DIAGNOSIS — I15.2 HYPERTENSION ASSOCIATED WITH TYPE 2 DIABETES MELLITUS (HCC): ICD-10-CM

## 2022-12-06 DIAGNOSIS — E11.9 CONTROLLED TYPE 2 DIABETES MELLITUS WITHOUT COMPLICATION, WITHOUT LONG-TERM CURRENT USE OF INSULIN (HCC): ICD-10-CM

## 2022-12-06 DIAGNOSIS — E66.01 MORBID (SEVERE) OBESITY DUE TO EXCESS CALORIES (HCC): ICD-10-CM

## 2022-12-06 DIAGNOSIS — F41.1 GENERALIZED ANXIETY DISORDER: ICD-10-CM

## 2022-12-06 DIAGNOSIS — I25.10 CORONARY ARTERIOSCLEROSIS IN NATIVE ARTERY: ICD-10-CM

## 2022-12-06 DIAGNOSIS — I73.9 PERIPHERAL VASCULAR DISEASE (HCC): ICD-10-CM

## 2022-12-09 RX ORDER — ALPRAZOLAM 0.25 MG/1
TABLET ORAL
Qty: 30 TABLET | Refills: 0 | Status: SHIPPED | OUTPATIENT
Start: 2022-12-09

## 2023-01-03 ENCOUNTER — NURSE TRIAGE (OUTPATIENT)
Dept: INTERNAL MEDICINE CLINIC | Facility: CLINIC | Age: 88
End: 2023-01-03

## 2023-01-03 NOTE — TELEPHONE ENCOUNTER
One thing I am concerned about is stroke for pt complaining of dizziness. She had hx of carotid stenosis before and could be at risk for strokes causing dizziness; would recommend gong to ER today so seh can be evaluated and possibly get ct brain there. dont want to wait if it's stroke.

## 2023-01-03 NOTE — TELEPHONE ENCOUNTER
Advised pt that doctor would like her to be evaluated in the ER today. Pt states that she can get her daughter to drive her there. Will follow up with her tomorrow.

## 2023-01-04 NOTE — TELEPHONE ENCOUNTER
Contact Information   [phone] [facility] Phone)  Unable to leave message  [phone] (Mobile) Left message to call back.

## 2023-01-05 NOTE — TELEPHONE ENCOUNTER
Contact Information   [phone] [facility] Phone)  Unable to leave message  [phone] (Mobile) Left message to call back  2nd attempt

## 2023-01-11 ENCOUNTER — PATIENT OUTREACH (OUTPATIENT)
Dept: CASE MANAGEMENT | Age: 88
End: 2023-01-11

## 2023-01-11 NOTE — PROGRESS NOTES
Contacting patient to follow up on CCM for the month.  LMCB    Total time - 3 min  Total Monthly time- 3  min

## 2023-01-13 ENCOUNTER — PATIENT OUTREACH (OUTPATIENT)
Dept: CASE MANAGEMENT | Age: 88
End: 2023-01-13

## 2023-01-13 DIAGNOSIS — I15.2 HYPERTENSION ASSOCIATED WITH TYPE 2 DIABETES MELLITUS (HCC): ICD-10-CM

## 2023-01-13 DIAGNOSIS — E11.9 CONTROLLED TYPE 2 DIABETES MELLITUS WITHOUT COMPLICATION, WITHOUT LONG-TERM CURRENT USE OF INSULIN (HCC): ICD-10-CM

## 2023-01-13 DIAGNOSIS — E78.5 HYPERLIPIDEMIA ASSOCIATED WITH TYPE 2 DIABETES MELLITUS (HCC): ICD-10-CM

## 2023-01-13 DIAGNOSIS — E11.59 HYPERTENSION ASSOCIATED WITH TYPE 2 DIABETES MELLITUS (HCC): ICD-10-CM

## 2023-01-13 DIAGNOSIS — E11.69 HYPERLIPIDEMIA ASSOCIATED WITH TYPE 2 DIABETES MELLITUS (HCC): ICD-10-CM

## 2023-01-13 DIAGNOSIS — I25.10 CORONARY ARTERIOSCLEROSIS IN NATIVE ARTERY: ICD-10-CM

## 2023-01-16 RX ORDER — ALPRAZOLAM 0.25 MG/1
TABLET ORAL
Qty: 30 TABLET | Refills: 0 | Status: SHIPPED | OUTPATIENT
Start: 2023-01-16

## 2023-01-21 ENCOUNTER — OFFICE VISIT (OUTPATIENT)
Dept: INTERNAL MEDICINE CLINIC | Facility: CLINIC | Age: 88
End: 2023-01-21

## 2023-01-21 ENCOUNTER — LAB ENCOUNTER (OUTPATIENT)
Dept: LAB | Age: 88
End: 2023-01-21
Attending: INTERNAL MEDICINE
Payer: MEDICARE

## 2023-01-21 VITALS
BODY MASS INDEX: 33.03 KG/M2 | HEART RATE: 78 BPM | DIASTOLIC BLOOD PRESSURE: 67 MMHG | WEIGHT: 179.5 LBS | TEMPERATURE: 98 F | SYSTOLIC BLOOD PRESSURE: 114 MMHG | OXYGEN SATURATION: 95 % | HEIGHT: 62 IN

## 2023-01-21 DIAGNOSIS — N39.0 URINARY TRACT INFECTION WITHOUT HEMATURIA, SITE UNSPECIFIED: ICD-10-CM

## 2023-01-21 DIAGNOSIS — R30.0 DYSURIA: ICD-10-CM

## 2023-01-21 DIAGNOSIS — I50.33 ACUTE ON CHRONIC DIASTOLIC HEART FAILURE (HCC): Primary | ICD-10-CM

## 2023-01-21 DIAGNOSIS — E66.01 MORBID (SEVERE) OBESITY DUE TO EXCESS CALORIES (HCC): ICD-10-CM

## 2023-01-21 DIAGNOSIS — N18.31 STAGE 3A CHRONIC KIDNEY DISEASE (HCC): ICD-10-CM

## 2023-01-21 DIAGNOSIS — J18.9 COMMUNITY ACQUIRED PNEUMONIA, UNSPECIFIED LATERALITY: ICD-10-CM

## 2023-01-21 DIAGNOSIS — M06.9 RHEUMATOID ARTHRITIS INVOLVING MULTIPLE SITES, UNSPECIFIED WHETHER RHEUMATOID FACTOR PRESENT (HCC): ICD-10-CM

## 2023-01-21 DIAGNOSIS — J34.89 RHINORRHEA: ICD-10-CM

## 2023-01-21 DIAGNOSIS — I73.9 PERIPHERAL VASCULAR DISEASE (HCC): ICD-10-CM

## 2023-01-21 DIAGNOSIS — I47.1 SUPRAVENTRICULAR TACHYCARDIA (HCC): ICD-10-CM

## 2023-01-21 LAB
BILIRUB UR QL CFM: NEGATIVE
CLARITY UR: CLEAR
COLOR UR: YELLOW
GLUCOSE UR-MCNC: NEGATIVE MG/DL
HGB UR QL STRIP.AUTO: NEGATIVE
HYALINE CASTS #/AREA URNS AUTO: PRESENT /LPF
LEUKOCYTE ESTERASE UR QL STRIP.AUTO: NEGATIVE
NITRITE UR QL STRIP.AUTO: NEGATIVE
PH UR: 5.5 [PH] (ref 5–8)
SP GR UR STRIP: 1.02 (ref 1–1.03)
UROBILINOGEN UR STRIP-ACNC: 0.2

## 2023-01-21 PROCEDURE — 90662 IIV NO PRSV INCREASED AG IM: CPT | Performed by: INTERNAL MEDICINE

## 2023-01-21 PROCEDURE — G0008 ADMIN INFLUENZA VIRUS VAC: HCPCS | Performed by: INTERNAL MEDICINE

## 2023-01-21 PROCEDURE — 1111F DSCHRG MED/CURRENT MED MERGE: CPT | Performed by: INTERNAL MEDICINE

## 2023-01-21 PROCEDURE — 87086 URINE CULTURE/COLONY COUNT: CPT

## 2023-01-21 PROCEDURE — 99214 OFFICE O/P EST MOD 30 MIN: CPT | Performed by: INTERNAL MEDICINE

## 2023-01-21 PROCEDURE — 81001 URINALYSIS AUTO W/SCOPE: CPT

## 2023-01-21 PROCEDURE — 1126F AMNT PAIN NOTED NONE PRSNT: CPT | Performed by: INTERNAL MEDICINE

## 2023-01-21 PROCEDURE — 81015 MICROSCOPIC EXAM OF URINE: CPT

## 2023-01-21 RX ORDER — FLUTICASONE PROPIONATE 50 MCG
2 SPRAY, SUSPENSION (ML) NASAL DAILY
Qty: 1 EACH | Refills: 2 | Status: SHIPPED | OUTPATIENT
Start: 2023-01-21 | End: 2024-01-16

## 2023-01-26 ENCOUNTER — PATIENT OUTREACH (OUTPATIENT)
Dept: CASE MANAGEMENT | Age: 88
End: 2023-01-26

## 2023-01-26 ENCOUNTER — TELEPHONE (OUTPATIENT)
Dept: INTERNAL MEDICINE CLINIC | Facility: CLINIC | Age: 88
End: 2023-01-26

## 2023-01-26 DIAGNOSIS — I25.10 CORONARY ARTERIOSCLEROSIS IN NATIVE ARTERY: ICD-10-CM

## 2023-01-26 DIAGNOSIS — R60.0 EDEMA OF LEFT UPPER ARM: ICD-10-CM

## 2023-01-26 DIAGNOSIS — E11.9 CONTROLLED TYPE 2 DIABETES MELLITUS WITHOUT COMPLICATION, WITHOUT LONG-TERM CURRENT USE OF INSULIN (HCC): ICD-10-CM

## 2023-01-26 DIAGNOSIS — E11.69 HYPERLIPIDEMIA ASSOCIATED WITH TYPE 2 DIABETES MELLITUS (HCC): ICD-10-CM

## 2023-01-26 DIAGNOSIS — E78.5 HYPERLIPIDEMIA ASSOCIATED WITH TYPE 2 DIABETES MELLITUS (HCC): ICD-10-CM

## 2023-01-26 DIAGNOSIS — F41.1 GENERALIZED ANXIETY DISORDER: ICD-10-CM

## 2023-01-26 NOTE — TELEPHONE ENCOUNTER
Pt called with a condition update. States yesterday she passed a hard and painful stool, noted a little blood in the toilette. A little later she went back to the bathroom to urinate and the toilette bowl was full of blood. She assumed it was coming out of her vaginal area. Son drove her to ED she had a pelvic exam and was advised everything appeared okay, she was discharged and referred to OBGYN     This morning it dawned to her blood may have come out of her rectal area. She is worried she may have a hemorrhoid and is asking how she should proceed. A rectal exam was not completed during ED visit. Reports occasionally being constipated. She will  over the counter Fibercon or Miralax and will work on increasing her water intake.

## 2023-01-26 NOTE — TELEPHONE ENCOUNTER
Left message to call us back -office number and hours provided (mobile voice mail ). Called home number, unable to leave any messages, voice mail is full. Patient is not MyChart active.

## 2023-01-26 NOTE — TELEPHONE ENCOUNTER
She will need to see gastro since she is thinking it's blood in stools; we can refer to our GI clnic; ok to see NP. Go back to ER if she has more bloody stools.

## 2023-01-26 NOTE — PROGRESS NOTES
Pt called with a condition update. States yesterday she passed a hard and painful stool, noted a little blood in the toilette. A little later she went back to the bathroom to urinate and the toilette bowl was full of blood. She assumed it was coming out of her vaginal area. Son drove her to ED she had a pelvic exam and was advised everything appeared okay, she was discharged and referred to OBGYN     This morning it dawned to her blood may have come out of her rectal area. She is worried she may have a hemorrhoid and is asking how she should proceed. A rectal exam was not completed during ED visit. Reports occasionally being constipated. She will  over the counter Fibercon or Miralax and will work on increasing her water intake. *Intervention: tried to schedule appointment with Dr. Silver Swann first available was 2/8/2023  *TE routed to Dr. Benjamin Lloyd office  * Encouraged patient to stay hydrated and wait for Dr. Silver Swann triage to contact her.        Total time - 13 min  Total Monthly time-47  min

## 2023-01-27 NOTE — TELEPHONE ENCOUNTER
Spoke with patient,  verified. Gave Dr Girish Manuel message and gave number to call GI. She states does not need formal referrals. She will call for appointment. Reviewed using fluids, fiber therapy, Sitz bath pan, OTCs for symptom management of hemorrhoids.   She expressed great appreciation for Dr Guero Saxena.

## 2023-02-16 ENCOUNTER — PATIENT OUTREACH (OUTPATIENT)
Dept: CASE MANAGEMENT | Age: 88
End: 2023-02-16

## 2023-02-16 ENCOUNTER — MED REC SCAN ONLY (OUTPATIENT)
Dept: INTERNAL MEDICINE CLINIC | Facility: CLINIC | Age: 88
End: 2023-02-16

## 2023-02-16 DIAGNOSIS — E11.9 CONTROLLED TYPE 2 DIABETES MELLITUS WITHOUT COMPLICATION, WITHOUT LONG-TERM CURRENT USE OF INSULIN (HCC): ICD-10-CM

## 2023-02-16 DIAGNOSIS — F41.1 GENERALIZED ANXIETY DISORDER: ICD-10-CM

## 2023-02-16 DIAGNOSIS — M06.9 RHEUMATOID ARTHRITIS INVOLVING MULTIPLE SITES, UNSPECIFIED WHETHER RHEUMATOID FACTOR PRESENT (HCC): ICD-10-CM

## 2023-02-16 DIAGNOSIS — I73.9 PERIPHERAL VASCULAR DISEASE (HCC): ICD-10-CM

## 2023-02-16 DIAGNOSIS — R60.0 EDEMA OF LEFT UPPER ARM: ICD-10-CM

## 2023-02-17 NOTE — PROGRESS NOTES
Pt called with an update. She is still having trouble getting a hold of Dr. Juan Wilson office. Conference called Dr. Juan Wilson office. Assisted in coordinating an appointment with Dr. Juan Wilson on Monday Feb 20th at 1pm. Left a message with staff personal in regards to left wrist swelling and discomfort. Pt will wait for Dr. Belem Cole office to call her with further instructions and information on Rheumatrex refill. Assisted patient in scheduling Medicare Px with Dr. Kilo Lambert for next month.      Total time -15  min  Total Monthly time- 43 min

## 2023-02-17 NOTE — TELEPHONE ENCOUNTER
Please review. Protocol failed or has no protocol.      Requested Prescriptions   Pending Prescriptions Disp Refills    ALPRAZOLAM 0.25 MG Oral Tab [Pharmacy Med Name: ALPRAZOLAM 0.25 MG TABLET] 30 tablet 0     Sig: TAKE 1 TABLET BY MOUTH EVERY DAY AS NEEDED FOR ANXIETY       There is no refill protocol information for this order          Recent Outpatient Visits              3 weeks ago Acute on chronic diastolic heart failure ([facility] [address]    [address], [place], Renata Sawyer MD    Office Visit    5 months ago COVID-19 virus detected    Kayleen Lopez MD    Virtual Phone E/M    8 months ago Status post replacement of right shoulder joint    [address], [place], Renata Sawyer MD    Office Visit    9 months ago Preop general physical exam    [address], [place], Renata Sawyer MD    Office Visit    1 year ago Non-seasonal allergic rhinitis, unspecified trigger    [address], [place], Renata Sawyer MD    Office Visit            Future Appointments         Provider Department Appt Notes    In 1 month MD Sergio Raman Addison     In 2 months Carlos Alberto [facility], [address], [address]

## 2023-02-18 RX ORDER — ALPRAZOLAM 0.25 MG/1
TABLET ORAL
Qty: 30 TABLET | Refills: 0 | Status: SHIPPED | OUTPATIENT
Start: 2023-02-18

## 2023-02-18 NOTE — TELEPHONE ENCOUNTER
Infusion Nursing Note:  Brigitte Xavier presents today for fluorouracil home pump disconnect and neulasta onbody.    Patient seen by provider today: No   present during visit today: Not Applicable.    Note: Patient arrives to infusion room for pump disconnect, chemotherapy bag is completely empty upon arrival. Denies pain, fever, chills, cough, SOB. BP remains elevated but her PCP put her back on 25mg hydrochlorothiazide and she just picked it up today. Has a blood pressure cuff at home and will continue to monitor.    Intravenous Access:  Implanted Port.    Treatment Conditions:  Not Applicable.    Post Infusion Assessment:  Patient tolerated infusion without incident.  Blood return noted pre and post infusion.  No evidence of extravasations.  Access discontinued per protocol.     Neulasta Onpro On-Body injector applied to left arm at 1450.  Writer discussed Neulasta injection would start tomorrow 2/19 at 5:50pm, approximately 27 hours after application applied today.  Written and Verbal instruction reviewed with patient.  Pt instructed when the dose delivery starts, it will take about 45 minutes to complete.  Pt aware Neulasta Onpro On-Body should have green flashing light and to call triage or on-call MD if injector flashes red or appears to be leaking. Pt aware to keep Onpro On-Body Neulasta 4 inches away from electrical equipment and to avoid showering 4 hours prior to injection.   Neulasta Onpro Lot number: [id number]    Discharge Plan:   Patient declined prescription refills.  AVS to patient via [employer].  Patient will return 3/1 for next appointment.   Patient discharged in stable condition accompanied by: self.  Departure Mode: Ambulatory.      Kristen Cowan RN                       Leonora Mc from [facility].  Stts pt is doing great and will not need any further home health physical therapy

## 2023-03-01 ENCOUNTER — PATIENT OUTREACH (OUTPATIENT)
Dept: CASE MANAGEMENT | Age: 88
End: 2023-03-01

## 2023-03-01 DIAGNOSIS — E11.69 HYPERLIPIDEMIA ASSOCIATED WITH TYPE 2 DIABETES MELLITUS (HCC): ICD-10-CM

## 2023-03-01 DIAGNOSIS — E11.59 HYPERTENSION ASSOCIATED WITH TYPE 2 DIABETES MELLITUS (HCC): ICD-10-CM

## 2023-03-01 DIAGNOSIS — F41.1 GENERALIZED ANXIETY DISORDER: ICD-10-CM

## 2023-03-01 DIAGNOSIS — E11.9 CONTROLLED TYPE 2 DIABETES MELLITUS WITHOUT COMPLICATION, WITHOUT LONG-TERM CURRENT USE OF INSULIN (HCC): ICD-10-CM

## 2023-03-01 DIAGNOSIS — I15.2 HYPERTENSION ASSOCIATED WITH TYPE 2 DIABETES MELLITUS (HCC): ICD-10-CM

## 2023-03-01 DIAGNOSIS — E78.5 HYPERLIPIDEMIA ASSOCIATED WITH TYPE 2 DIABETES MELLITUS (HCC): ICD-10-CM

## 2023-03-01 DIAGNOSIS — I25.10 CORONARY ARTERIOSCLEROSIS IN NATIVE ARTERY: ICD-10-CM

## 2023-03-28 ENCOUNTER — OFFICE VISIT (OUTPATIENT)
Dept: INTERNAL MEDICINE CLINIC | Facility: CLINIC | Age: 88
End: 2023-03-28

## 2023-03-28 VITALS
HEART RATE: 73 BPM | OXYGEN SATURATION: 95 % | WEIGHT: 181.31 LBS | HEIGHT: 62 IN | BODY MASS INDEX: 33.36 KG/M2 | SYSTOLIC BLOOD PRESSURE: 138 MMHG | TEMPERATURE: 97 F | DIASTOLIC BLOOD PRESSURE: 66 MMHG

## 2023-03-28 DIAGNOSIS — I47.1 SUPRAVENTRICULAR TACHYCARDIA (HCC): ICD-10-CM

## 2023-03-28 DIAGNOSIS — K62.5 RECTAL BLEEDING: ICD-10-CM

## 2023-03-28 DIAGNOSIS — E11.69 HYPERLIPIDEMIA ASSOCIATED WITH TYPE 2 DIABETES MELLITUS (HCC): ICD-10-CM

## 2023-03-28 DIAGNOSIS — M06.9 RHEUMATOID ARTHRITIS INVOLVING MULTIPLE SITES, UNSPECIFIED WHETHER RHEUMATOID FACTOR PRESENT (HCC): ICD-10-CM

## 2023-03-28 DIAGNOSIS — E11.59 HYPERTENSION ASSOCIATED WITH TYPE 2 DIABETES MELLITUS (HCC): ICD-10-CM

## 2023-03-28 DIAGNOSIS — Z00.00 MEDICARE ANNUAL WELLNESS VISIT, SUBSEQUENT: Primary | ICD-10-CM

## 2023-03-28 DIAGNOSIS — K21.9 GASTROESOPHAGEAL REFLUX DISEASE, UNSPECIFIED WHETHER ESOPHAGITIS PRESENT: ICD-10-CM

## 2023-03-28 DIAGNOSIS — I73.9 PERIPHERAL VASCULAR DISEASE (HCC): ICD-10-CM

## 2023-03-28 DIAGNOSIS — I25.10 CORONARY ARTERIOSCLEROSIS IN NATIVE ARTERY: ICD-10-CM

## 2023-03-28 DIAGNOSIS — E11.9 CONTROLLED TYPE 2 DIABETES MELLITUS WITHOUT COMPLICATION, WITHOUT LONG-TERM CURRENT USE OF INSULIN (HCC): ICD-10-CM

## 2023-03-28 DIAGNOSIS — I70.0 THORACIC AORTIC ATHEROSCLEROSIS (HCC): ICD-10-CM

## 2023-03-28 DIAGNOSIS — E78.5 HYPERLIPIDEMIA ASSOCIATED WITH TYPE 2 DIABETES MELLITUS (HCC): ICD-10-CM

## 2023-03-28 DIAGNOSIS — N18.31 STAGE 3A CHRONIC KIDNEY DISEASE (HCC): ICD-10-CM

## 2023-03-28 DIAGNOSIS — J31.0 CHRONIC RHINITIS: ICD-10-CM

## 2023-03-28 DIAGNOSIS — F41.1 GENERALIZED ANXIETY DISORDER: ICD-10-CM

## 2023-03-28 DIAGNOSIS — E66.01 MORBID (SEVERE) OBESITY DUE TO EXCESS CALORIES (HCC): ICD-10-CM

## 2023-03-28 DIAGNOSIS — Z91.81 RISK FOR FALLS: ICD-10-CM

## 2023-03-28 DIAGNOSIS — I15.2 HYPERTENSION ASSOCIATED WITH TYPE 2 DIABETES MELLITUS (HCC): ICD-10-CM

## 2023-03-28 RX ORDER — LOSARTAN POTASSIUM 100 MG/1
TABLET ORAL
COMMUNITY
Start: 2023-03-18

## 2023-03-31 PROBLEM — R60.0 EDEMA OF LEFT UPPER ARM: Status: RESOLVED | Noted: 2021-01-30 | Resolved: 2023-03-31

## 2023-03-31 PROBLEM — K62.5 RECTAL BLEEDING: Status: ACTIVE | Noted: 2023-03-31

## 2023-03-31 PROBLEM — J06.9 VIRAL UPPER RESPIRATORY TRACT INFECTION: Status: RESOLVED | Noted: 2022-01-18 | Resolved: 2023-03-31

## 2023-03-31 PROBLEM — J31.0 CHRONIC RHINITIS: Status: ACTIVE | Noted: 2023-03-31

## 2023-04-03 ENCOUNTER — APPOINTMENT (OUTPATIENT)
Dept: GENERAL RADIOLOGY | Age: 88
End: 2023-04-03
Attending: NURSE PRACTITIONER
Payer: MEDICARE

## 2023-04-03 ENCOUNTER — HOSPITAL ENCOUNTER (OUTPATIENT)
Age: 88
Discharge: HOME | End: 2023-04-03
Payer: MEDICARE

## 2023-04-03 VITALS
DIASTOLIC BLOOD PRESSURE: 49 MMHG | SYSTOLIC BLOOD PRESSURE: 135 MMHG | OXYGEN SATURATION: 99 % | TEMPERATURE: 97 F | HEART RATE: 73 BPM | RESPIRATION RATE: 16 BRPM

## 2023-04-03 DIAGNOSIS — R07.0 THROAT PAIN: Primary | ICD-10-CM

## 2023-04-03 DIAGNOSIS — R50.9 FEVER, UNSPECIFIED FEVER CAUSE: ICD-10-CM

## 2023-04-03 DIAGNOSIS — J04.0 ACUTE LARYNGITIS: ICD-10-CM

## 2023-04-03 DIAGNOSIS — H61.21 IMPACTED CERUMEN OF RIGHT EAR: ICD-10-CM

## 2023-04-03 LAB
S PYO AG THROAT QL: NEGATIVE
SARS-COV-2 RNA RESP QL NAA+PROBE: NOT DETECTED

## 2023-04-03 PROCEDURE — 99213 OFFICE O/P EST LOW 20 MIN: CPT | Performed by: NURSE PRACTITIONER

## 2023-04-03 PROCEDURE — 71046 X-RAY EXAM CHEST 2 VIEWS: CPT | Performed by: NURSE PRACTITIONER

## 2023-04-03 PROCEDURE — U0002 COVID-19 LAB TEST NON-CDC: HCPCS | Performed by: NURSE PRACTITIONER

## 2023-04-03 PROCEDURE — 87880 STREP A ASSAY W/OPTIC: CPT | Performed by: NURSE PRACTITIONER

## 2023-04-03 NOTE — DISCHARGE INSTRUCTIONS
No pneumonia seen on the chest x-ray. COVID is negative. Strep is negative. Symptoms appear to be caused by a virus. Push fluids, hot tea with honey.   Follow-up with your doctor if no improvement

## 2023-04-07 ENCOUNTER — TELEPHONE (OUTPATIENT)
Dept: INTERNAL MEDICINE CLINIC | Facility: CLINIC | Age: 88
End: 2023-04-07

## 2023-04-07 ENCOUNTER — PATIENT OUTREACH (OUTPATIENT)
Dept: CASE MANAGEMENT | Age: 88
End: 2023-04-07

## 2023-04-07 ENCOUNTER — HOSPITAL ENCOUNTER (OUTPATIENT)
Age: 88
Discharge: OTHER INSTITUTION | End: 2023-04-07
Payer: MEDICARE

## 2023-04-07 VITALS
HEART RATE: 86 BPM | DIASTOLIC BLOOD PRESSURE: 57 MMHG | SYSTOLIC BLOOD PRESSURE: 142 MMHG | RESPIRATION RATE: 26 BRPM | TEMPERATURE: 99 F | OXYGEN SATURATION: 91 %

## 2023-04-07 DIAGNOSIS — R06.02 SHORTNESS OF BREATH: Primary | ICD-10-CM

## 2023-04-07 DIAGNOSIS — J31.0 CHRONIC RHINITIS: ICD-10-CM

## 2023-04-07 DIAGNOSIS — F41.1 GENERALIZED ANXIETY DISORDER: ICD-10-CM

## 2023-04-07 DIAGNOSIS — E11.9 CONTROLLED TYPE 2 DIABETES MELLITUS WITHOUT COMPLICATION, WITHOUT LONG-TERM CURRENT USE OF INSULIN (HCC): ICD-10-CM

## 2023-04-07 PROCEDURE — 99214 OFFICE O/P EST MOD 30 MIN: CPT | Performed by: NURSE PRACTITIONER

## 2023-04-07 NOTE — ED INITIAL ASSESSMENT (HPI)
Patient reports a cough, fever, nasal congestion, and some upper back pain when she takes a deep breath in. Patient was here on Tuesday and was told to come back if not feeling better. Patient was negative for covid on Tuesday.

## 2023-04-07 NOTE — TELEPHONE ENCOUNTER
She went to  on 4/3/2023 with ear pain, sore throat, hoarseness x 1 day. She is still not feeling better, feels very tired, voice is hoarse, has a productive cough and a lot of chest congestion and nasal congestion . She is drinking tea, Allegra and resting. Pt is asking if she may do something else. She really hates to be sick [place]. Please advise patient.

## 2023-04-07 NOTE — TELEPHONE ENCOUNTER
I reviweed her IC visit; she had negative covid, strep test and negative cxr so this is another viral infection so antibotics not indicated and will just need to treat symptomatically; symptoms may take few days more to imprvove however if she worsens, go to ER or IC over the weekend.

## 2023-04-07 NOTE — ED QUICK NOTES
Provider came to assess patient and recommended patient to go to ER for further workup. Patient verbally agreed and wants to go to [facility]. Patient currently on 2L O2 per [place] and is sating at 92%. Patient's daughter with patient.

## 2023-04-13 NOTE — TELEPHONE ENCOUNTER
Please Review.  Protocol failed or has no protocol  Requested Prescriptions   Pending Prescriptions Disp Refills    ALPRAZOLAM 0.25 MG Oral Tab [Pharmacy Med Name: ALPRAZOLAM 0.25 MG TABLET] 30 tablet 0     Sig: TAKE 1 TABLET BY MOUTH EVERY DAY AS NEEDED FOR ANXIETY       There is no refill protocol information for this order         Recent Outpatient Visits              2 weeks ago Medicare annual wellness visit, subsequent    Felicity Sanchez, Loreta Murillo MD    Office Visit    2 months ago Acute on chronic diastolic heart failure [facility])    eFlicity Sanchez, Loreta Murillo MD    Office Visit    7 months ago COVID-19 virus detected    [address] [facility], Donna Millan MD    Virtual Phone E/M    10 months ago Status post replacement of right shoulder joint    Felicity Sanchez, Loreta Murillo MD    Office Visit    11 months ago Preop general physical exam    Felicity Sanchez, Loreta Murillo MD    Office Visit           Future Appointments         Provider Department Appt Notes    In 1 week Angelia Deluca, [address], [address] [facility]

## 2023-04-14 ENCOUNTER — TELEPHONE (OUTPATIENT)
Dept: INTERNAL MEDICINE CLINIC | Facility: CLINIC | Age: 88
End: 2023-04-14

## 2023-04-14 NOTE — TELEPHONE ENCOUNTER
May keep it at 4L and then can start weaning down depending on her O2 sat when her [address] visits. Keep her O2 sat at least 92% and above.

## 2023-04-14 NOTE — TELEPHONE ENCOUNTER
Tang Pulido, [address] [facility] saw patient today. Stated that discharge papers indicated that patient on 1 liter of oxygen but supplier set up home oxygen at 4 liters. Tang Pulido is leaving the setting at 4 liters currently. Wanted to get clarification and orders on the oxygen. Please advise.

## 2023-04-14 NOTE — TELEPHONE ENCOUNTER
Advised HH RN of Dr. Kristin Pitt note. Fátima Roman RN verbalized understanding and had no further questions.

## 2023-04-15 RX ORDER — ALPRAZOLAM 0.25 MG/1
TABLET ORAL
Qty: 30 TABLET | Refills: 0 | Status: SHIPPED | OUTPATIENT
Start: 2023-04-15

## 2023-04-18 RX ORDER — FLUTICASONE PROPIONATE 50 MCG
2 SPRAY, SUSPENSION (ML) NASAL DAILY
Qty: 48 ML | Refills: 3 | Status: SHIPPED | OUTPATIENT
Start: 2023-04-18

## 2023-04-18 NOTE — TELEPHONE ENCOUNTER
Refill passed per [place] [facility] protocol.    Requested Prescriptions   Pending Prescriptions Disp Refills    FLUTICASONE PROPIONATE 50 MCG/ACT Nasal Suspension [Pharmacy Med Name: FLUTICASONE PROP 50 MCG SPRAY] 48 mL 0     Sig: SPRAY 2 SPRAYS INTO EACH NOSTRIL EVERY DAY       Allergy Medication Protocol Passed - 4/17/2023  4:11 PM        Passed - In person appointment or virtual visit in the past 12 mos or appointment in next 3 mos     Recent Outpatient Visits              3 weeks ago Medicare annual wellness visit, subsequent    Loreta Jamil MD    Office Visit    2 months ago Acute on chronic diastolic heart failure [facility])    Felicity Sanchez, Loreta Murillo MD    Office Visit    7 months ago COVID-19 virus detected    Donna Jamil MD    Virtual Phone E/M    10 months ago Status post replacement of right shoulder joint    Felicity Sanchez, Loreta Murillo MD    Office Visit    11 months ago Preop general physical exam    Felicity Sanchez, Loreta Murillo MD    Office Visit          Future Appointments         Provider Department Appt Notes    In 1 week GALI Bowie, [address] [facility] [address]     In 3 weeks MD Pacheco Fernandes [address], [address] [facility] followup; pneumonia; declined sooner appt   (policy informed)                   Recent Outpatient Visits              3 weeks ago Nick Mares annual wellness visit, subsequent    Loreta Jamil MD    Office Visit    2 months ago Acute on chronic diastolic heart failure [facility])    Loreta Jamil MD    Office Visit    7 months ago COVID-19 virus detected    [address] [facility], Donna Millan MD    Virtual Phone E/M    10 months ago Status post replacement of right shoulder joint    [address], [address], Loreta Murillo MD    Office Visit    11 months ago Preop general physical exam    [address], [address], Loreta Murillo MD    Office Visit           Future Appointments         Provider Department Appt Notes    [facility]     In 3 weeks Thomas Champagne MD [address], [address], [address] [facility] followup; pneumonia; declined sooner appt   (policy informed)

## 2023-04-21 ENCOUNTER — TELEPHONE (OUTPATIENT)
Dept: INTERNAL MEDICINE CLINIC | Facility: CLINIC | Age: 88
End: 2023-04-21

## 2023-04-24 RX ORDER — GABAPENTIN 300 MG/1
300 CAPSULE ORAL 3 TIMES DAILY
Qty: 270 CAPSULE | Refills: 3 | Status: SHIPPED | OUTPATIENT
Start: 2023-04-24

## 2023-04-24 NOTE — TELEPHONE ENCOUNTER
Refill passed per [place] [facility] protocol.       Requested Prescriptions   Pending Prescriptions Disp Refills    GABAPENTIN 300 MG Oral Cap [Pharmacy Med Name: Gabapentin 300 MG Oral Capsule] 270 capsule 3     Sig: TAKE 1 CAPSULE BY MOUTH 3  TIMES DAILY       Neurology Medications Passed - 4/24/2023 10:10 AM        Passed - In person appointment or virtual visit in the past 6 mos or appointment in next 3 mos     Recent Outpatient Visits              3 weeks ago Medicare annual wellness visit, subsequent    [address], Fernando Beach MD    Office Visit    3 months ago Acute on chronic diastolic heart failure [facility])    [address], [address], Fernando Beach MD    Office Visit    7 months ago COVID-19 virus detected    [address], aSmuel Dominique MD    Virtual Phone E/M    10 months ago Status post replacement of right shoulder joint    [address], [address], Fernando Beach MD    Office Visit    11 months ago Preop general physical exam    [address], [address], Fernando Beach MD    Office Visit          Future Appointments         Provider Department Appt Notes    Tomorrow [facility], [address], [address], [place]     In 2 weeks Jennifer Delarosa MD [address], [address], [address] [facility] followup; pneumonia; declined sooner appt   (policy informed)                     Future Appointments         Provider Department Appt Notes    Tomorrow [facility], [facility], [address], [place]     In 2 weeks Jennifer Delarosa MD [address], [address], [address] [facility] followup; pneumonia; declined sooner appt   (policy informed)            Recent Outpatient Visits              3 weeks ago Medicare annual wellness visit, subsequent    [address] MD    Office Visit    3 months ago Acute on chronic diastolic heart failure [facility])    [address] MD    Office Visit    7 months ago COVID-19 virus detected    [address], [address], [place] MD    Virtual Phone E/M    10 months ago Status post replacement of right shoulder joint    [address], [address], Afshan Yan MD    Office Visit    11 months ago Preop general physical exam    [address] MD    Office Visit

## 2023-05-08 ENCOUNTER — PATIENT OUTREACH (OUTPATIENT)
Dept: CASE MANAGEMENT | Age: 88
End: 2023-05-08

## 2023-05-08 DIAGNOSIS — I73.9 PERIPHERAL VASCULAR DISEASE (HCC): ICD-10-CM

## 2023-05-08 DIAGNOSIS — E11.9 CONTROLLED TYPE 2 DIABETES MELLITUS WITHOUT COMPLICATION, WITHOUT LONG-TERM CURRENT USE OF INSULIN (HCC): ICD-10-CM

## 2023-05-08 DIAGNOSIS — E11.59 HYPERTENSION ASSOCIATED WITH TYPE 2 DIABETES MELLITUS (HCC): ICD-10-CM

## 2023-05-08 DIAGNOSIS — E11.69 HYPERLIPIDEMIA ASSOCIATED WITH TYPE 2 DIABETES MELLITUS (HCC): ICD-10-CM

## 2023-05-08 DIAGNOSIS — E78.5 HYPERLIPIDEMIA ASSOCIATED WITH TYPE 2 DIABETES MELLITUS (HCC): ICD-10-CM

## 2023-05-08 DIAGNOSIS — I15.2 HYPERTENSION ASSOCIATED WITH TYPE 2 DIABETES MELLITUS (HCC): ICD-10-CM

## 2023-05-08 DIAGNOSIS — F41.1 GENERALIZED ANXIETY DISORDER: ICD-10-CM

## 2023-05-08 DIAGNOSIS — E66.01 MORBID (SEVERE) OBESITY DUE TO EXCESS CALORIES (HCC): ICD-10-CM

## 2023-05-08 DIAGNOSIS — I25.10 CORONARY ARTERIOSCLEROSIS IN NATIVE ARTERY: ICD-10-CM

## 2023-05-10 ENCOUNTER — OFFICE VISIT (OUTPATIENT)
Dept: INTERNAL MEDICINE CLINIC | Facility: CLINIC | Age: 88
End: 2023-05-10

## 2023-05-10 VITALS
HEIGHT: 62 IN | DIASTOLIC BLOOD PRESSURE: 74 MMHG | SYSTOLIC BLOOD PRESSURE: 126 MMHG | BODY MASS INDEX: 33.29 KG/M2 | WEIGHT: 180.88 LBS | HEART RATE: 77 BPM | TEMPERATURE: 97 F | OXYGEN SATURATION: 98 %

## 2023-05-10 DIAGNOSIS — F41.1 GENERALIZED ANXIETY DISORDER: ICD-10-CM

## 2023-05-10 DIAGNOSIS — Z91.81 RISK FOR FALLS: ICD-10-CM

## 2023-05-10 DIAGNOSIS — E78.5 HYPERLIPIDEMIA ASSOCIATED WITH TYPE 2 DIABETES MELLITUS (HCC): ICD-10-CM

## 2023-05-10 DIAGNOSIS — I25.10 CORONARY ARTERIOSCLEROSIS IN NATIVE ARTERY: ICD-10-CM

## 2023-05-10 DIAGNOSIS — E11.69 HYPERLIPIDEMIA ASSOCIATED WITH TYPE 2 DIABETES MELLITUS (HCC): ICD-10-CM

## 2023-05-10 DIAGNOSIS — N18.31 STAGE 3A CHRONIC KIDNEY DISEASE (HCC): ICD-10-CM

## 2023-05-10 DIAGNOSIS — I50.33 ACUTE ON CHRONIC DIASTOLIC HEART FAILURE (HCC): ICD-10-CM

## 2023-05-10 DIAGNOSIS — J18.9 COMMUNITY ACQUIRED PNEUMONIA, UNSPECIFIED LATERALITY: Primary | ICD-10-CM

## 2023-05-10 DIAGNOSIS — E11.59 HYPERTENSION ASSOCIATED WITH TYPE 2 DIABETES MELLITUS (HCC): ICD-10-CM

## 2023-05-10 DIAGNOSIS — I73.9 PERIPHERAL VASCULAR DISEASE (HCC): ICD-10-CM

## 2023-05-10 DIAGNOSIS — M06.9 RHEUMATOID ARTHRITIS INVOLVING MULTIPLE SITES, UNSPECIFIED WHETHER RHEUMATOID FACTOR PRESENT (HCC): ICD-10-CM

## 2023-05-10 DIAGNOSIS — E11.9 CONTROLLED TYPE 2 DIABETES MELLITUS WITHOUT COMPLICATION, WITHOUT LONG-TERM CURRENT USE OF INSULIN (HCC): ICD-10-CM

## 2023-05-10 DIAGNOSIS — I15.2 HYPERTENSION ASSOCIATED WITH TYPE 2 DIABETES MELLITUS (HCC): ICD-10-CM

## 2023-05-10 LAB
CARTRIDGE LOT#: NORMAL NUMERIC
HEMOGLOBIN A1C: 5.6 % (ref 4.3–5.6)

## 2023-05-10 PROCEDURE — 99214 OFFICE O/P EST MOD 30 MIN: CPT | Performed by: INTERNAL MEDICINE

## 2023-05-10 PROCEDURE — 1111F DSCHRG MED/CURRENT MED MERGE: CPT | Performed by: INTERNAL MEDICINE

## 2023-05-10 PROCEDURE — 1126F AMNT PAIN NOTED NONE PRSNT: CPT | Performed by: INTERNAL MEDICINE

## 2023-05-10 PROCEDURE — 83036 HEMOGLOBIN GLYCOSYLATED A1C: CPT | Performed by: INTERNAL MEDICINE

## 2023-05-10 RX ORDER — ALPRAZOLAM 0.25 MG/1
0.25 TABLET ORAL DAILY PRN
Qty: 30 TABLET | Refills: 0 | Status: CANCELLED | OUTPATIENT
Start: 2023-05-10

## 2023-06-08 ENCOUNTER — PATIENT OUTREACH (OUTPATIENT)
Dept: CASE MANAGEMENT | Age: 88
End: 2023-06-08

## 2023-06-08 ENCOUNTER — TELEPHONE (OUTPATIENT)
Dept: INTERNAL MEDICINE CLINIC | Facility: CLINIC | Age: 88
End: 2023-06-08

## 2023-06-08 DIAGNOSIS — I25.10 CORONARY ARTERIOSCLEROSIS IN NATIVE ARTERY: ICD-10-CM

## 2023-06-08 DIAGNOSIS — E66.01 MORBID (SEVERE) OBESITY DUE TO EXCESS CALORIES (HCC): ICD-10-CM

## 2023-06-08 DIAGNOSIS — E11.9 CONTROLLED TYPE 2 DIABETES MELLITUS WITHOUT COMPLICATION, WITHOUT LONG-TERM CURRENT USE OF INSULIN (HCC): ICD-10-CM

## 2023-06-08 DIAGNOSIS — E78.5 HYPERLIPIDEMIA ASSOCIATED WITH TYPE 2 DIABETES MELLITUS (HCC): ICD-10-CM

## 2023-06-08 DIAGNOSIS — E11.69 HYPERLIPIDEMIA ASSOCIATED WITH TYPE 2 DIABETES MELLITUS (HCC): ICD-10-CM

## 2023-06-08 DIAGNOSIS — F41.1 GENERALIZED ANXIETY DISORDER: ICD-10-CM

## 2023-06-08 RX ORDER — KETOCONAZOLE 20 MG/G
1 CREAM TOPICAL 2 TIMES DAILY
Qty: 60 G | Refills: 0 | Status: SHIPPED | OUTPATIENT
Start: 2023-06-08

## 2023-06-08 NOTE — TELEPHONE ENCOUNTER
This is possibly candida intertrigo; we can give her antifungal cream ; we can give her ketoconazole  cream ; applied bid for 10 days.   erx sent;   Call back if no resolution

## 2023-06-08 NOTE — TELEPHONE ENCOUNTER
States she has excess loose skin from her stomach. She has note some irritation under the skin for a few days. Denies any open sores, itchiness or discharge. She is cleaning it with water trying to keep area dry. Found some old cream at home started applying it ( Calmoseptine cream ), along with hemorrhoid cream. States it seems to be helping. Reports running low on Calmoseptine.  Please advise

## 2023-06-27 ENCOUNTER — PATIENT OUTREACH (OUTPATIENT)
Dept: CASE MANAGEMENT | Age: 88
End: 2023-06-27

## 2023-07-13 NOTE — MR AVS SNAPSHOT
Can we clarify what current dose pt is on of Ozempic - last rx was the titration pack and I'm not sure which dose she is currently on - let me know and I will approve the rx [facility] [address] [place] [address]  [phone]               Thank you for choosing us for your health care visit with Elda Barron MD.  We are glad to serve you and happy to provide you with this summar gabapentin 300 MG Caps   TAKE 1 CAPSULE (300 MG TOTAL) BY MOUTH 3 (THREE) TIMES DAILY.    Commonly known as:  NEURONTIN           GlipiZIDE ER 5 MG Tb24   TAKE 1 TABLET BY MOUTH EVERY DAY IN THE MORNING WITH BREAKFAST   Commonly known as:  GLIPIZIDE XL Never

## 2023-07-17 ENCOUNTER — TELEPHONE (OUTPATIENT)
Dept: INTERNAL MEDICINE CLINIC | Facility: CLINIC | Age: 88
End: 2023-07-17

## 2023-07-17 ENCOUNTER — PATIENT OUTREACH (OUTPATIENT)
Dept: CASE MANAGEMENT | Age: 88
End: 2023-07-17

## 2023-07-17 DIAGNOSIS — I15.2 HYPERTENSION ASSOCIATED WITH TYPE 2 DIABETES MELLITUS: ICD-10-CM

## 2023-07-17 DIAGNOSIS — I25.10 CORONARY ARTERIOSCLEROSIS IN NATIVE ARTERY: Primary | ICD-10-CM

## 2023-07-17 DIAGNOSIS — E11.9 CONTROLLED TYPE 2 DIABETES MELLITUS WITHOUT COMPLICATION, WITHOUT LONG-TERM CURRENT USE OF INSULIN (HCC): ICD-10-CM

## 2023-07-17 DIAGNOSIS — E11.59 HYPERTENSION ASSOCIATED WITH TYPE 2 DIABETES MELLITUS: ICD-10-CM

## 2023-07-17 NOTE — TELEPHONE ENCOUNTER
States she was prescribed an oxygen concentrator when discharged from ED after COVID, pneumonia dx. Pt used oxygen for a couple weeks and stopped it. States she forgot about it until she received a bill from Medicare indicating they are paying for it monthly. Pt reports she checks her O2 stats daily and its always between 92-96. Denies any SOB. She is inquiring if she needs an order from Dr. Reginaldo Green to [facility]. She also wanted to let Dr. Reginaldo Green know she does not think she needs it. Please advise pt.

## 2023-07-17 NOTE — PROGRESS NOTES
7/17/2023  Spoke to [employer] for CCM. Updates to patient care team/ comments: UTD  Patient reported changes in medications: None  Med Adherence  Comment: Taking as directed    Health Maintenance: Reviewed with patient. Zoster Vaccines(1 of 2) Never done  COVID-19 Vaccine(4 - Pfizer series) due on 11/10/2021  Diabetes Care Dilated Eye Exam due on 08/20/2022  Diabetes Care Foot Exam (Annual) due on 01/01/2023  Influenza Vaccine(1) due on 10/01/2023  Diabetes Care: GFR due on 10/10/2023  Diabetes Care: Microalb/Creat Ratio due on 10/10/2023  Diabetes Care A1C due on 11/10/2023  Annual Physical due on 03/28/2024  Annual Depression Screening Completed  Fall Risk Screening (Annual) Completed  Pneumococcal Vaccine: 65+ Years Completed    Patient current concerns:     Pt reports she was prescribed an oxygen concentrator when discharged from ED after COVID, pneumonia dx. Pt used oxygen for a couple weeks and stopped it. States she forgot about it until she received a bill from Medicare indicating they are paying for it monthly. Pt reports she checks her O2 stats daily and its always between 92-96. Denies any SOB. She is inquiring on how to return concentrator [facility]. Self Management Goals/Action Plan: Active goal from previous outreach:   Try to remain positive and optimistic. Practice positive self talk. Patient reported progress toward goal: Pt birthday was yesterday. She is very content states she was celebrated all weekend and has more plans this week to celebrate. She is in a great mood, states she loves summer. She is enjoying spending time outdoors and taking care of her flower pots.      New Goal (if previous met)  My goal for next month is: (Patient Stated)           - What: She wants to return portable concentrator.            - How: She will follow up with a TE to Dr. Reginaldo Green for advise before calling [facility] and request a     Care Manager Follow Up: One month    Reason For Follow Up: review progress and or barriers towards patients goals. Care managers interventions: Continue to provided encouragement, support and education for healthy cooping and dx. Wished patient a happy birthday! Praised on positive attitude. TE routed to Dr. Harley Allison    Reviewed healthy eating habits, regular exercise and preventative guidelines. Reinforced healthy diet, lifestyle, and exercise. Reviewed future appointments with patient. No future appointments. Time Spent This Encounter Total: 25 min medical record review, telephone communication, care plan updates where needed, education, goals and action plan recreation/update. Provided acknowledgment and validation to patient's concerns. Monthly Minute Total including today: 25  Physical assessment, complete health history, and care plan established by Shirley Suarez MD, need for CCM determined from these assessments and data.

## 2023-08-10 NOTE — TELEPHONE ENCOUNTER
We can call company supplying her oxygen concentrator and give them order that her supplemental O2 and concentrator can be discobntinued

## 2023-08-10 NOTE — TELEPHONE ENCOUNTER
Left message for pt to call back to verify which company supplied her oxygen concentrator in order for the office to give them a call and obtain company phone number from the pt if she has it.

## 2023-08-10 NOTE — TELEPHONE ENCOUNTER
Patient is calling and states that she got a phone call from a nurse that she can stop using hr oxygen. Per patient she needs something sent to the oxygen company stating that she is okay to be released from using her oxygen and then they will come to pick it up.

## 2023-08-11 ENCOUNTER — TELEPHONE (OUTPATIENT)
Dept: INTERNAL MEDICINE CLINIC | Facility: CLINIC | Age: 88
End: 2023-08-11

## 2023-08-11 DIAGNOSIS — K21.9 GASTROESOPHAGEAL REFLUX DISEASE, UNSPECIFIED WHETHER ESOPHAGITIS PRESENT: ICD-10-CM

## 2023-08-11 RX ORDER — OMEPRAZOLE 20 MG/1
20 CAPSULE, DELAYED RELEASE ORAL
Qty: 90 CAPSULE | Refills: 1 | Status: SHIPPED | OUTPATIENT
Start: 2023-08-11

## 2023-08-11 NOTE — TELEPHONE ENCOUNTER
Per patient she would like her Oxygen to stop and needs to be  already. but per patient she doesn't know who is the company who will do this. Please advise.

## 2023-08-16 ENCOUNTER — MED REC SCAN ONLY (OUTPATIENT)
Dept: INTERNAL MEDICINE CLINIC | Facility: CLINIC | Age: 88
End: 2023-08-16

## 2023-08-29 ENCOUNTER — PATIENT OUTREACH (OUTPATIENT)
Dept: CASE MANAGEMENT | Age: 88
End: 2023-08-29

## 2023-08-29 DIAGNOSIS — E11.59 HYPERTENSION ASSOCIATED WITH TYPE 2 DIABETES MELLITUS: ICD-10-CM

## 2023-08-29 DIAGNOSIS — I15.2 HYPERTENSION ASSOCIATED WITH TYPE 2 DIABETES MELLITUS: ICD-10-CM

## 2023-08-29 DIAGNOSIS — E11.69 HYPERLIPIDEMIA ASSOCIATED WITH TYPE 2 DIABETES MELLITUS: ICD-10-CM

## 2023-08-29 DIAGNOSIS — E11.9 CONTROLLED TYPE 2 DIABETES MELLITUS WITHOUT COMPLICATION, WITHOUT LONG-TERM CURRENT USE OF INSULIN (HCC): ICD-10-CM

## 2023-08-29 DIAGNOSIS — E78.5 HYPERLIPIDEMIA ASSOCIATED WITH TYPE 2 DIABETES MELLITUS: ICD-10-CM

## 2023-08-29 DIAGNOSIS — E66.01 MORBID (SEVERE) OBESITY DUE TO EXCESS CALORIES (HCC): Primary | ICD-10-CM

## 2023-08-29 DIAGNOSIS — F41.1 GENERALIZED ANXIETY DISORDER: ICD-10-CM

## 2023-08-30 RX ORDER — ALPRAZOLAM 0.25 MG/1
0.25 TABLET ORAL DAILY PRN
Qty: 30 TABLET | Refills: 0 | Status: SHIPPED | OUTPATIENT
Start: 2023-08-30

## 2023-09-08 ENCOUNTER — MED REC SCAN ONLY (OUTPATIENT)
Dept: INTERNAL MEDICINE CLINIC | Facility: CLINIC | Age: 88
End: 2023-09-08

## 2023-09-19 ENCOUNTER — TELEPHONE (OUTPATIENT)
Dept: INTERNAL MEDICINE CLINIC | Facility: CLINIC | Age: 88
End: 2023-09-19

## 2023-09-19 NOTE — TELEPHONE ENCOUNTER
Prescription/certificate of medical necessity for incontinence supplies received from [employer], placed on Dr Tammie tiwari for review and signature.

## 2023-09-25 ENCOUNTER — APPOINTMENT (OUTPATIENT)
Dept: GENERAL RADIOLOGY | Age: 88
End: 2023-09-25
Attending: NURSE PRACTITIONER
Payer: MEDICARE

## 2023-09-25 ENCOUNTER — NURSE TRIAGE (OUTPATIENT)
Dept: INTERNAL MEDICINE CLINIC | Facility: CLINIC | Age: 88
End: 2023-09-25

## 2023-09-25 ENCOUNTER — HOSPITAL ENCOUNTER (OUTPATIENT)
Age: 88
Discharge: HOME | End: 2023-09-25
Payer: MEDICARE

## 2023-09-25 VITALS
RESPIRATION RATE: 18 BRPM | OXYGEN SATURATION: 93 % | DIASTOLIC BLOOD PRESSURE: 66 MMHG | TEMPERATURE: 97 F | HEART RATE: 74 BPM | SYSTOLIC BLOOD PRESSURE: 135 MMHG

## 2023-09-25 DIAGNOSIS — R07.9 CHEST PAIN OF UNCERTAIN ETIOLOGY: Primary | ICD-10-CM

## 2023-09-25 LAB
#MXD IC: 0.4 X10ˆ3/UL (ref 0.1–1)
ATRIAL RATE: 71 BPM
BUN BLD-MCNC: 22 MG/DL (ref 7–18)
CHLORIDE BLD-SCNC: 108 MMOL/L (ref 98–112)
CO2 BLD-SCNC: 26 MMOL/L (ref 21–32)
CREAT BLD-MCNC: 0.9 MG/DL
EGFRCR SERPLBLD CKD-EPI 2021: 62 ML/MIN/1.73M2 (ref 60–?)
GLUCOSE BLD-MCNC: 110 MG/DL (ref 70–99)
HCT VFR BLD AUTO: 40.3 %
HCT VFR BLD CALC: 42 %
HGB BLD-MCNC: 12.8 G/DL
ISTAT IONIZED CALCIUM FOR CHEM 8: 1.19 MMOL/L (ref 1.12–1.32)
LYMPHOCYTES # BLD AUTO: 2.4 X10ˆ3/UL (ref 1–4)
LYMPHOCYTES NFR BLD AUTO: 31.6 %
MCH RBC QN AUTO: 28.4 PG (ref 26–34)
MCHC RBC AUTO-ENTMCNC: 31.8 G/DL (ref 31–37)
MCV RBC AUTO: 89.4 FL (ref 80–100)
MIXED CELL %: 5.2 %
NEUTROPHILS # BLD AUTO: 4.8 X10ˆ3/UL (ref 1.5–7.7)
NEUTROPHILS NFR BLD AUTO: 63.2 %
P AXIS: 44 DEGREES
P-R INTERVAL: 178 MS
PLATELET # BLD AUTO: 237 X10ˆ3/UL (ref 150–450)
POTASSIUM BLD-SCNC: 4.6 MMOL/L (ref 3.6–5.1)
Q-T INTERVAL: 434 MS
QRS DURATION: 132 MS
QTC CALCULATION (BEZET): 471 MS
R AXIS: -24 DEGREES
RBC # BLD AUTO: 4.51 X10ˆ6/UL
SODIUM BLD-SCNC: 142 MMOL/L (ref 136–145)
T AXIS: 97 DEGREES
TROPONIN I BLD-MCNC: <0.02 NG/ML
VENTRICULAR RATE: 71 BPM
WBC # BLD AUTO: 7.6 X10ˆ3/UL (ref 4–11)

## 2023-09-25 PROCEDURE — 84484 ASSAY OF TROPONIN QUANT: CPT | Performed by: NURSE PRACTITIONER

## 2023-09-25 PROCEDURE — 93000 ELECTROCARDIOGRAM COMPLETE: CPT | Performed by: NURSE PRACTITIONER

## 2023-09-25 PROCEDURE — 80047 BASIC METABLC PNL IONIZED CA: CPT | Performed by: NURSE PRACTITIONER

## 2023-09-25 PROCEDURE — 99213 OFFICE O/P EST LOW 20 MIN: CPT | Performed by: NURSE PRACTITIONER

## 2023-09-25 PROCEDURE — 85025 COMPLETE CBC W/AUTO DIFF WBC: CPT | Performed by: NURSE PRACTITIONER

## 2023-09-25 PROCEDURE — 71046 X-RAY EXAM CHEST 2 VIEWS: CPT | Performed by: NURSE PRACTITIONER

## 2023-09-25 RX ORDER — ASPIRIN 81 MG/1
81 TABLET, CHEWABLE ORAL ONCE
Status: COMPLETED | OUTPATIENT
Start: 2023-09-25 | End: 2023-09-25

## 2023-09-25 RX ORDER — ASPIRIN 81 MG/1
81 TABLET ORAL DAILY
Qty: 90 TABLET | Refills: 0 | Status: SHIPPED | OUTPATIENT
Start: 2023-09-25 | End: 2023-12-24

## 2023-09-25 NOTE — TELEPHONE ENCOUNTER
Action Requested: Summary for Provider     []  Critical Lab, Recommendations Needed  [] Need Additional Advice  []   FYI    []   Need Orders  [] Need Medications Sent to Pharmacy  []  Other     SUMMARY: Per protocol, advised patient to go to the Emergency Department or Immediate Care to evaluate episode of chest pain on 2023 that lasted a half an hour. Patient denies any symptoms today but is concerned she may have had a heart attack. She plans to go to Immediate Care. Reason for call: Acute  Episode of chest pain 2023F  Onset: Friday evening     Spoke with patient,  verified. Reports had a half hour of chest pain in left side of chest on 2023. Felt like lighting pains. Denied pressure, shortness of breath or diaphoresis. After the half hour she felt fine. Daughter came over to check on her. Went to [facility] over the weekend to visit family. Wants to find out if she had a mild heart attack.     Reason for Disposition   Chest pain lasting longer than 5 minutes and occurred in last 3 days (72 hours) (Exception: feels exactly the same as previously diagnosed heartburn and has accompanying sour taste in mouth)    Protocols used: Chest Pain-A-[place]

## 2023-09-25 NOTE — ED INITIAL ASSESSMENT (HPI)
Denies chest pain at this time. Pt reports chest pain \"like a storm in my chest\" lasting approx 30 min, which occurred Friday at 5 pm.     Patient has anxiety, takes xanax PRN. Son believes that she may have an anxiety attack at that time.

## 2023-09-25 NOTE — DISCHARGE INSTRUCTIONS
Work-up today looks good. Continue your home medications. Follow-up with cardiology.   Go to the emergency room if new or worsening symptoms

## 2023-09-28 NOTE — TELEPHONE ENCOUNTER
Prescription/certificate of medical necessity for incontinence supplies completed and faxed to [employer] at [phone], confirmation received.

## 2023-10-03 ENCOUNTER — PATIENT OUTREACH (OUTPATIENT)
Dept: CASE MANAGEMENT | Age: 88
End: 2023-10-03

## 2023-10-03 DIAGNOSIS — E11.59 HYPERTENSION ASSOCIATED WITH TYPE 2 DIABETES MELLITUS: ICD-10-CM

## 2023-10-03 DIAGNOSIS — I73.9 PERIPHERAL VASCULAR DISEASE (HCC): ICD-10-CM

## 2023-10-03 DIAGNOSIS — F41.1 GENERALIZED ANXIETY DISORDER: ICD-10-CM

## 2023-10-03 DIAGNOSIS — E11.69 HYPERLIPIDEMIA ASSOCIATED WITH TYPE 2 DIABETES MELLITUS: ICD-10-CM

## 2023-10-03 DIAGNOSIS — E78.5 HYPERLIPIDEMIA ASSOCIATED WITH TYPE 2 DIABETES MELLITUS: ICD-10-CM

## 2023-10-03 DIAGNOSIS — E11.9 CONTROLLED TYPE 2 DIABETES MELLITUS WITHOUT COMPLICATION, WITHOUT LONG-TERM CURRENT USE OF INSULIN (HCC): Primary | ICD-10-CM

## 2023-10-03 DIAGNOSIS — I15.2 HYPERTENSION ASSOCIATED WITH TYPE 2 DIABETES MELLITUS: ICD-10-CM

## 2023-10-03 NOTE — PROGRESS NOTES
Spoke to [employer] for [facility]. Updates to patient care team/comments: UTD  Patient reported changes in medications: None  Med Adherence  Comment: taking as directed    Health Maintenance: Discussed with patient. Zoster Vaccines(1 of 2) Never done  COVID-19 Vaccine(4 - Pfizer series) due on 11/10/2021  Diabetes Care Dilated Eye Exam due on 08/20/2022  Diabetes Care Foot Exam (Annual) due on 01/01/2023  Influenza Vaccine(1) due on 10/01/2023  Diabetes Care: Microalb/Creat Ratio due on 10/10/2023  Diabetes Care A1C due on 11/10/2023  Annual Physical due on 03/28/2024  Diabetes Care: GFR due on 09/25/2024  Annual Depression Screening Completed  Fall Risk Screening (Annual) Completed  Pneumococcal Vaccine: 65+ Years Completed    Patient updates/concerns:     She was getting ready to go to [place] she was very excited started baking. Once she set down she felt like a lightning sensation on her chest. Son told her to take her Alprazolam, and felt much better. She called her daughter, they took her to  by then she felt fine. Pt is scheduled to see Dr. Dottie Olson cardiologist on 10/30/23. Eric Bernard friend is staying with her from [facility]. 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 Sho Hobbs for advise. How Often: as needed     Where: at home    Patient reported progress towards goals: She is working towards goal               - What: Started taking Alprazolam           - Where/When/How: Dr. Kadi Dee refilled Alprazolam for patient she is taking one table daily as needed. States she does not take it everyday, only takes it when feeling very anxious. States it is really helping her sleep and get through the days. Patient Reported Barriers and Concerns: Stats she does not have any new concerns at this time.  She still has to keep working on controlling anxiety. - Plan for overcoming barriers: She will keep busy to decrease feeling lonely and try praying. Care Managers Interventions: Praised patient on efforts to stay busy and work on reducing her anxiety. Suggested she try meditation. Discussed updating preventative care. Plans to get boosters at local pharmacy. Future Appointments: No future appointments. Next Care Manager Follow Up Date: One month    Reason For Follow Up: review progress and or barriers towards patient's goals. Time Spent This Encounter Total: 27 min medical record review, telephone communication, care plan updates where needed, education, goals, and action plan recreation/update. Provided acknowledgment and validation to patient's concerns.    Monthly Minute Total including today: 27  Physical assessment, complete health history, and need for CCM established by Yulisa Jiménez MD.

## 2023-10-26 ENCOUNTER — PATIENT OUTREACH (OUTPATIENT)
Dept: CASE MANAGEMENT | Age: 88
End: 2023-10-26

## 2023-10-26 NOTE — H&P
Pt called states daughter noticed a growth in one of her eyes. Pt states she was seeing a surgeon opthalmology in [address] [place] [address] and is not able to recall her name. Reviewed patient care team and advised Dr. Elkin Galan is an opthalmology on her care team. Provided contact information. Care every where updated    Immunization query completed. No updates available at this time. Shingles HM updated. Pt states she was advised by Rheumatologist Noble Ruano not to get it because she is on Methotrexate long term. Social determinants of health updated     Provided support. Encouraged patient to call as needed.       Total time - 10 min  Total Monthly time-  37min

## 2023-11-06 NOTE — TELEPHONE ENCOUNTER
Please review. Protocol failed or has no protocol.      Requested Prescriptions   Pending Prescriptions Disp Refills    ALPRAZOLAM 0.25 MG Oral Tab [Pharmacy Med Name: ALPRAZOLAM 0.25 MG TABLET] 30 tablet 0     Sig: TAKE 1 TABLET BY MOUTH EVERY DAY AS NEEDED FOR ANXIETY       There is no refill protocol information for this order          Recent Outpatient Visits              6 months ago Community acquired pneumonia, unspecified laterality    Richa Ryan Tristan Floss, MD    Office Visit    7 months ago [facility] annual wellness visit, subsequent    James Mcleod MD    Office Visit    9 months ago Acute on chronic diastolic heart failure [facility])    Richa Ryan Tristan Floss, MD    Office Visit    1 year ago COVID-19 virus detected    Millie Mcleod MD    Virtual Phone E/M    1 year ago Status post replacement of right shoulder joint    Richa Ryan Tristan Floss, MD    Office Visit

## 2023-11-07 RX ORDER — ALPRAZOLAM 0.25 MG/1
0.25 TABLET ORAL DAILY PRN
Qty: 30 TABLET | Refills: 0 | Status: SHIPPED | OUTPATIENT
Start: 2023-11-07

## 2023-11-10 ENCOUNTER — PATIENT OUTREACH (OUTPATIENT)
Dept: CASE MANAGEMENT | Age: 88
End: 2023-11-10

## 2023-11-10 DIAGNOSIS — E66.01 MORBID (SEVERE) OBESITY DUE TO EXCESS CALORIES (HCC): Primary | ICD-10-CM

## 2023-11-10 DIAGNOSIS — E11.59 HYPERTENSION ASSOCIATED WITH TYPE 2 DIABETES MELLITUS: ICD-10-CM

## 2023-11-10 DIAGNOSIS — E78.5 HYPERLIPIDEMIA ASSOCIATED WITH TYPE 2 DIABETES MELLITUS: ICD-10-CM

## 2023-11-10 DIAGNOSIS — F41.1 GENERALIZED ANXIETY DISORDER: ICD-10-CM

## 2023-11-10 DIAGNOSIS — I15.2 HYPERTENSION ASSOCIATED WITH TYPE 2 DIABETES MELLITUS: ICD-10-CM

## 2023-11-10 DIAGNOSIS — E11.69 HYPERLIPIDEMIA ASSOCIATED WITH TYPE 2 DIABETES MELLITUS: ICD-10-CM

## 2023-11-10 NOTE — PROGRESS NOTES
Spoke to [facility] for [facility]. Updates to patient care team/comments: None  Patient reported changes in medications: None  Med Adherence  Comment: taking as directed    Health Maintenance:   Health Maintenance   Topic Date Due    Diabetes Care Dilated Eye Exam  08/20/2022 Encouraged patient to schedule appointment. Diabetes Care Foot Exam (Annual)  01/01/2023ncouraged patient to schedule appointment. COVID-19 Vaccine (4 - 2023-24 season) 09/01/2023 Plans on getting it a local pharmacy. Influenza Vaccine (1) 10/01/2023Plans on getting it a local pharmacy. Diabetes Care: Microalb/Creat Ratio  10/10/2023 Advised patient on pending orders. Diabetes Care A1C  11/10/2023   Advised patient on pending orders. Zoster Vaccines (1 of 2) 10/26/2028 (Originally 7/16/1985)    Annual Physical  03/28/2024    Diabetes Care: GFR  09/25/2024    Annual Depression Screening  Completed    Fall Risk Screening (Annual)  Completed    Pneumococcal Vaccine: 65+ Years  Completed     Patient updates/concerns:     Pt states she is overall doing well. Friend is visiting from [place] for the past two weeks. She is a little sad, as her friend returns home this week. Pt enjoyed her friends company, they were both very busy shopping and attending several activities. Pt spoke at length about her experience. States she is eating balanced meals,     Pt called to schedule an appointment with Dr. Srinivasan Davis. States she needs to check her calendar to see the date. Goals/Action Plan: Active goal from previous outreach:   What: Improve my sleep     When: as soon as possible     How:  By controlling my anxiety and reaching out to Dr. Berenice Farias for advise. How Often: as needed     Where: at home  Patient reported progress towards goals:               - What: Pt anxiety had decreased           - Where/When/How: She is trying to stay busy to prevent feeling lonely and worried all the time.  Friend  from [facility] came and stayed with patient for one week. She currently has a friend visiting from [facility]. She is looking forward to next couple of months because of the holidays. Patient Reported Barriers and Concerns: Pt states she does not have any new concerns at this time. - Plan for overcoming barriers: None    Care Managers Interventions:     Praised patient on efforts to stay active and busy. Encouraged patient to update preventative care. Future Appointments: No future appointments    Next Care Manager Follow Up Date: One month    Reason For Follow Up: review progress and or barriers towards patient's goals. Time Spent This Encounter Total: 25 min medical record review, telephone communication, care plan updates where needed, education, goals, and action plan recreation/update. Provided acknowledgment and validation to patient's concerns.    Monthly Minute Total including today: 25  Physical assessment, complete health history, and need for CCM established by Jesus Clemons MD.

## 2023-12-08 NOTE — TELEPHONE ENCOUNTER
Please review; protocol failed.     Requested Prescriptions   Pending Prescriptions Disp Refills    ALPRAZOLAM 0.25 MG Oral Tab [Pharmacy Med Name: ALPRAZOLAM 0.25 MG TABLET] 30 tablet 0     Sig: TAKE 1 TABLET BY MOUTH DAILY AS NEEDED FOR ANXIETY       There is no refill protocol information for this order              Recent Outpatient Visits              7 months ago Community acquired pneumonia, unspecified laterality    [address], [place], James Tracy MD    Office Visit    8 months ago [employer] annual wellness visit, subsequent    [address] [facility], James Tracy MD    Office Visit    10 months ago Acute on chronic diastolic heart failure [facility])    [address], [place], James Tracy MD    Office Visit    1 year ago COVID-19 virus detected    [address] [facility], Millie Callahan MD    Virtual Phone E/M    1 year ago Status post replacement of right shoulder joint    [address], [place], James Tracy MD    Office Visit

## 2023-12-10 RX ORDER — ALPRAZOLAM 0.25 MG/1
0.25 TABLET ORAL DAILY PRN
Qty: 30 TABLET | Refills: 0 | Status: SHIPPED | OUTPATIENT
Start: 2023-12-10

## 2023-12-12 DIAGNOSIS — E11.69 HYPERLIPIDEMIA ASSOCIATED WITH TYPE 2 DIABETES MELLITUS  (HCC): ICD-10-CM

## 2023-12-12 DIAGNOSIS — E78.5 HYPERLIPIDEMIA ASSOCIATED WITH TYPE 2 DIABETES MELLITUS  (HCC): ICD-10-CM

## 2023-12-14 ENCOUNTER — TELEPHONE (OUTPATIENT)
Dept: INTERNAL MEDICINE CLINIC | Facility: CLINIC | Age: 88
End: 2023-12-14

## 2023-12-14 RX ORDER — EZETIMIBE 10 MG/1
10 TABLET ORAL NIGHTLY
Qty: 90 TABLET | Refills: 3 | OUTPATIENT
Start: 2023-12-14

## 2023-12-14 NOTE — TELEPHONE ENCOUNTER
Nusrat Jani  [phone]     Will Dr Tony Frank sign orders for home health for this pt for nurse OT PT?     Call back today, they are seeing pt tomorrow; pt released yesterday from hospital.

## 2023-12-14 NOTE — TELEPHONE ENCOUNTER
Spoke with Alisson with [facility] and informed of Dr Aure Clemens message, Myah Awad verbalized understanding.

## 2023-12-14 NOTE — TELEPHONE ENCOUNTER
Duplicate request, previously addressed. Too soon     Disp Refills Start End    ezetimibe 10 MG Oral Tab 90 tablet 3 3/2/2023 --    Sig - Route: Take 1 tablet (10 mg total) by mouth nightly.  - Oral    Sent to pharmacy as: Ezetimibe 10 MG Oral Tablet (Zetia)    Notes to Pharmacy: Requesting 1 year supply    E-Prescribing Status: Receipt confirmed by pharmacy (3/2/2023  3:42 PM CST)

## 2023-12-19 ENCOUNTER — TELEPHONE (OUTPATIENT)
Dept: INTERNAL MEDICINE CLINIC | Facility: CLINIC | Age: 88
End: 2023-12-19

## 2023-12-19 ENCOUNTER — MED REC SCAN ONLY (OUTPATIENT)
Dept: INTERNAL MEDICINE CLINIC | Facility: CLINIC | Age: 88
End: 2023-12-19

## 2023-12-19 NOTE — TELEPHONE ENCOUNTER
ZGXID#[id number] received from [facility] brothers placed on Dr. Florentin tiwari for signature and review.

## 2023-12-20 ENCOUNTER — PATIENT OUTREACH (OUTPATIENT)
Dept: CASE MANAGEMENT | Age: 88
End: 2023-12-20

## 2023-12-20 NOTE — PROGRESS NOTES
Reviewed pt's chart including recent visits. Attempted to contact pt for monthly outreach no answer left detailed message for pt to call back. Reconciled chart using care everywhere. Medications: Their are not outside medications available for reconcile. Immunization:  Their are no vaccines available to be reconciled    Pt is due for:     Health Maintenance   Topic Date Due    Diabetes Care Dilated Eye Exam  08/20/2022    Diabetes Care Foot Exam (Annual)  01/01/2023    COVID-19 Vaccine (4 - 2023-24 season) 09/01/2023    Influenza Vaccine (1) 10/01/2023    Diabetes Care: Microalb/Creat Ratio  10/10/2023    Diabetes Care A1C  11/10/2023    Zoster Vaccines (1 of 2) 10/26/2028 (Originally 7/16/1985)    Annual Physical  03/28/2024    Diabetes Care: GFR  09/25/2024    Annual Depression Screening  Completed    Fall Risk Screening (Annual)  Completed    Pneumococcal Vaccine: 65+ Years  Completed       Future Appointments   Date Time Provider Adalgisa Viramontes   12/29/2023  3:30 PM Sheldon Em MD [facility]       Total time - 3 min  Total Monthly time- 3 min

## 2024-01-02 ENCOUNTER — MED REC SCAN ONLY (OUTPATIENT)
Dept: INTERNAL MEDICINE CLINIC | Facility: CLINIC | Age: 89
End: 2024-01-02

## 2024-01-04 ENCOUNTER — PATIENT OUTREACH (OUTPATIENT)
Dept: CASE MANAGEMENT | Age: 89
End: 2024-01-04

## 2024-01-04 DIAGNOSIS — F41.1 GENERALIZED ANXIETY DISORDER: ICD-10-CM

## 2024-01-04 DIAGNOSIS — I15.2 HYPERTENSION ASSOCIATED WITH TYPE 2 DIABETES MELLITUS  (HCC): ICD-10-CM

## 2024-01-04 DIAGNOSIS — E11.9 CONTROLLED TYPE 2 DIABETES MELLITUS WITHOUT COMPLICATION, WITHOUT LONG-TERM CURRENT USE OF INSULIN (HCC): Primary | ICD-10-CM

## 2024-01-04 DIAGNOSIS — I25.10 CORONARY ARTERIOSCLEROSIS IN NATIVE ARTERY: ICD-10-CM

## 2024-01-04 DIAGNOSIS — E11.59 HYPERTENSION ASSOCIATED WITH TYPE 2 DIABETES MELLITUS  (HCC): ICD-10-CM

## 2024-01-04 DIAGNOSIS — E11.69 HYPERLIPIDEMIA ASSOCIATED WITH TYPE 2 DIABETES MELLITUS  (HCC): ICD-10-CM

## 2024-01-04 DIAGNOSIS — E78.5 HYPERLIPIDEMIA ASSOCIATED WITH TYPE 2 DIABETES MELLITUS  (HCC): ICD-10-CM

## 2024-01-04 NOTE — PROGRESS NOTES
Spoke to Lucie for CCM.      Updates to patient care team/comments: None  Patient reported changes in medications: She completed a Methylprednisolone dose pack.    Med Adherence  Comment: Pt reports taking mediations as directed.     Health Maintenance: Reviewed health maintenance with patient.  Health Maintenance   Topic Date Due    Diabetes Care Dilated Eye Exam  08/20/2022    COVID-19 Vaccine (4 - 2023-24 season) 09/01/2023    Influenza Vaccine (1) 10/01/2023 Updated    Diabetes Care: Microalb/Creat Ratio  10/10/2023    Diabetes Care A1C  11/10/2023    Annual Depression Screening  01/01/2024 Updated    Fall Risk Screening (Annual)  01/01/2024 Updated    Diabetes Care Foot Exam (Annual)  01/01/2024    Annual Physical  03/28/2024    Zoster Vaccines (1 of 2) 10/26/2028 (Originally 7/16/1985)    Diabetes Care: GFR  09/25/2024    Pneumococcal Vaccine: 65+ Years  Completed       Patient updates/concerns:     Pt reports she was admitted at [facility] twice last month. She was diagnosed with pneumonia. Discharged home on  3 liters of continuous oxygen.States nasal canula is causing her nose to feel very sore. She does not like having oxygen on. She has home health set up, is receiving PT once a week. Overall states she is feeling much better. Reports completing a medrol dose pack    States appetite is fine. Family has been stopping in and dropping off meals and groceries.     Granddaughter stopped by, we had to end call before we had time to go over everything. Pt states she will call back tomorrow or next week.    Goals/Action Plan:    Active goal from previous outreach:   What: Improve my sleep     When: as soon as possible     How:  By controlling my anxiety and reaching out to Dr. Marmolejo for advise.     How Often: as needed     Where: at home    Patient reported progress towards goals: She continues to work towards goal               - What: Sleep has improved           - Where/When/How: Pt reports since  she started using oxygen is she getting more restorative sleep.  Patient Reported Barriers and Concerns: She continues to feel anxious.                   - Plan for overcoming barriers: Pt will keep appointment with Dr. Marmolejo and will discuss anxiety.    Care Managers Interventions:     Assisted in scheduling a hospital follow up with Dr. Marmolejo.     Immunization query completed, Flu vaccine reconciled.     Care everywhere updated.    Future Appointments: No future appointments.    Next Care Manager Follow Up Date: One month    Reason For Follow Up: review progress and or barriers towards patient's goals.    Time Spent This Encounter Total: 27  min medical record review, telephone communication, care plan updates where needed, education, goals, and action plan recreation/update. Provided acknowledgment and validation to patient's concerns.   Monthly Minute Total including today: 27  Physical assessment, complete health history, and need for CCM established by Narayan Marmolejo MD.

## 2024-01-11 ENCOUNTER — OFFICE VISIT (OUTPATIENT)
Dept: INTERNAL MEDICINE CLINIC | Facility: CLINIC | Age: 89
End: 2024-01-11

## 2024-01-11 VITALS
OXYGEN SATURATION: 96 % | WEIGHT: 178.31 LBS | HEART RATE: 75 BPM | SYSTOLIC BLOOD PRESSURE: 144 MMHG | DIASTOLIC BLOOD PRESSURE: 74 MMHG | BODY MASS INDEX: 32.81 KG/M2 | TEMPERATURE: 98 F | HEIGHT: 62 IN

## 2024-01-11 DIAGNOSIS — E11.9 CONTROLLED TYPE 2 DIABETES MELLITUS WITHOUT COMPLICATION, WITHOUT LONG-TERM CURRENT USE OF INSULIN (HCC): ICD-10-CM

## 2024-01-11 DIAGNOSIS — E66.01 MORBID (SEVERE) OBESITY DUE TO EXCESS CALORIES (HCC): ICD-10-CM

## 2024-01-11 DIAGNOSIS — E11.69 HYPERLIPIDEMIA ASSOCIATED WITH TYPE 2 DIABETES MELLITUS  (HCC): ICD-10-CM

## 2024-01-11 DIAGNOSIS — I70.0 THORACIC AORTIC ATHEROSCLEROSIS (HCC): ICD-10-CM

## 2024-01-11 DIAGNOSIS — I50.33 ACUTE ON CHRONIC DIASTOLIC HEART FAILURE (HCC): Primary | ICD-10-CM

## 2024-01-11 DIAGNOSIS — E11.59 HYPERTENSION ASSOCIATED WITH TYPE 2 DIABETES MELLITUS  (HCC): ICD-10-CM

## 2024-01-11 DIAGNOSIS — I25.10 CORONARY ARTERIOSCLEROSIS IN NATIVE ARTERY: ICD-10-CM

## 2024-01-11 DIAGNOSIS — F41.1 GENERALIZED ANXIETY DISORDER: ICD-10-CM

## 2024-01-11 DIAGNOSIS — E78.5 HYPERLIPIDEMIA ASSOCIATED WITH TYPE 2 DIABETES MELLITUS  (HCC): ICD-10-CM

## 2024-01-11 DIAGNOSIS — M06.9 RHEUMATOID ARTHRITIS INVOLVING MULTIPLE SITES, UNSPECIFIED WHETHER RHEUMATOID FACTOR PRESENT (HCC): ICD-10-CM

## 2024-01-11 DIAGNOSIS — N18.31 STAGE 3A CHRONIC KIDNEY DISEASE (HCC): ICD-10-CM

## 2024-01-11 DIAGNOSIS — I15.2 HYPERTENSION ASSOCIATED WITH TYPE 2 DIABETES MELLITUS  (HCC): ICD-10-CM

## 2024-01-11 DIAGNOSIS — Z91.81 RISK FOR FALLS: ICD-10-CM

## 2024-01-11 PROCEDURE — 99214 OFFICE O/P EST MOD 30 MIN: CPT | Performed by: INTERNAL MEDICINE

## 2024-01-11 PROCEDURE — 1111F DSCHRG MED/CURRENT MED MERGE: CPT | Performed by: INTERNAL MEDICINE

## 2024-01-11 PROCEDURE — 83036 HEMOGLOBIN GLYCOSYLATED A1C: CPT | Performed by: INTERNAL MEDICINE

## 2024-01-11 PROCEDURE — 1126F AMNT PAIN NOTED NONE PRSNT: CPT | Performed by: INTERNAL MEDICINE

## 2024-01-11 PROCEDURE — 99499 UNLISTED E&M SERVICE: CPT | Performed by: INTERNAL MEDICINE

## 2024-01-11 RX ORDER — POTASSIUM CHLORIDE 750 MG/1
TABLET, FILM COATED, EXTENDED RELEASE ORAL
COMMUNITY
Start: 2023-11-26

## 2024-01-14 NOTE — PROGRESS NOTES
Subjective:     Patient ID: Lucie Schuster is a 88 year old female.    Patient presents today with her son for posthospital follow-up.  She was admitted at [facility] at least twice over the past 4 weeks.  We do not really have any medical records available.  She did have brief hospital discharge note that states that the patient was admitted for acute on chronic diastolic heart failure otherwise no other records to review.  According to the patient, her initial admission was currently thought to be secondary to pneumonia however she said that all the test did not reveal any signs of pneumonia.  She was apparently in the hospital for 5 days and eventually was discharged with no specific diagnosis according to the patient.  She had to come back again after a few days with basically same symptoms.  At this time probably related to decompensation of her diastolic heart failure.  She was currently treated with diuretics and did well this time.  She was discharged on furosemide and continued her blood pressure medications.  States she is feeling better compared to at the time that she was in the hospital.  She said that she does have an appointment set up to see her cardiologist Dr. Lowry at [facility].        History/Other:   Review of Systems   Constitutional: Negative.    Respiratory: Negative.     Cardiovascular: Negative.    Gastrointestinal: Negative.    Genitourinary: Negative.      Current Outpatient Medications   Medication Sig Dispense Refill    Potassium Chloride ER 10 MEQ Oral Tab CR       ALPRAZolam 0.25 MG Oral Tab Take 1 tablet (0.25 mg total) by mouth daily as needed for Anxiety. 30 tablet 0    omeprazole 20 MG Oral Capsule Delayed Release Take 1 capsule (20 mg total) by mouth before breakfast. 90 capsule 1    gabapentin 300 MG Oral Cap Take 1 capsule (300 mg total) by mouth 3 (three) times daily. 270 capsule 3    losartan 100 MG Oral Tab       ezetimibe 10 MG Oral Tab Take 1  tablet (10 mg total) by mouth nightly. 90 tablet 3    amLODIPine Besylate 5 MG Oral Tab Take by mouth.      furosemide 20 MG Oral Tab Take 1 tablet (20 mg total) by mouth daily as needed.      Metoprolol Succinate ER 50 MG Oral Tablet 24 Hr Take by mouth.      LOSARTAN POTASSIUM 50 MG Oral Tab TAKE 1 TABLET BY MOUTH EVERY DAY 90 tablet 0    Multiple Vitamins-Minerals (EYE VITAMINS) Oral Cap Take 1 capsule by mouth daily.      folic acid 1 MG Oral Tab Take 1 tablet (1 mg total) by mouth daily. 90 tablet 0    aspirin 81 MG Oral Tab Take 1 tablet (81 mg total) by mouth daily.      Calcium Carb-Cholecalciferol (CALCIUM + D3) 600-200 MG-UNIT Oral Tab Take 1 tablet by mouth daily.      methotrexate (RHEUMATREX) 2.5 MG Oral Tab Take 1 tablet (2.5 mg total) by mouth. TAKE 6 TAB ONCE A WEEK. PATIENT TAKING EVERY Thursday.      Red Yeast Rice 600 MG Oral Cap Take 2 tablets by mouth daily.      ketoconazole 2 % External Cream Apply 1 Application topically 2 (two) times daily. (Patient not taking: Reported on 1/11/2024) 60 g 0    fluticasone propionate 50 MCG/ACT Nasal Suspension 2 sprays by Nasal route daily. (Patient not taking: Reported on 1/11/2024) 48 mL 3     Allergies:  Allergies   Allergen Reactions    Sulfa Antibiotics SWELLING    Atorvastatin MYALGIA    Irbesartan MYALGIA    Rosuvastatin MYALGIA    Amoxicillin OTHER (SEE COMMENTS)     Yeast infection    Bactrim ITCHING    Clavulanic Acid OTHER (SEE COMMENTS)     Yeast infection    Codeine HIVES    Cefprozil ITCHING     Yeast infection    Lisinopril Coughing       Past Medical History:   Diagnosis Date    Acute subendocardial infarction, subsequent episode of care ([facility]) 11/25/2020    Acute, but ill-defined, cerebrovascular disease     had stroke left frontal, parietal, occipital    Anxiety     Atherosclerosis of coronary artery     Carotid stenosis     Carpal tunnel syndrome, right 5/21/2016    Diabetes ([facility])     as per NG    Esophageal reflux     as per NG    Lumbar  spinal stenosis     Obesity     Osteoarthritis     Other and unspecified hyperlipidemia     as per NG    Rheumatoid arthritis (HCC)     Status post carotid endarterectomy 2015    Type II or unspecified type diabetes mellitus without mention of complication, not stated as uncontrolled     Unspecified essential hypertension     as per NG      Past Surgical History:   Procedure Laterality Date    ANESTH,SURGERY OF SHOULDER Right     ANGIOPLASTY (CORONARY)      Angioplasty with Stent; as per NG    CHOLECYSTECTOMY      COLONOSCOPY      ELECTROCARDIOGRAM, COMPLETE  2013    SCANNED TO MEDIA TAB- 2013    KNEE REPLACEMENT SURGERY      OTHER SURGICAL HISTORY        Family History   Problem Relation Age of Onset    Kidney Disease Father         as per NG    Lipids Mother         Hyperlipidemia; as per NG    Hypertension Mother         as per NG    Heart Disease Mother         CAD; as per NG      Social History:   Social History     Socioeconomic History    Marital status:    Tobacco Use    Smoking status: Former     Types: Cigarettes     Quit date: 1995     Years since quittin.0     Passive exposure: Past    Smokeless tobacco: Never   Vaping Use    Vaping Use: Never used   Substance and Sexual Activity    Alcohol use: No     Alcohol/week: 0.0 standard drinks of alcohol    Drug use: No   Other Topics Concern    Caffeine Concern Yes     Comment: Coffee, 1 cup; as per      Social Determinants of Health     Financial Resource Strain: Low Risk  (2023)    Financial Resource Strain     Difficulty of Paying Living Expenses: Not very hard     Med Affordability: No   Food Insecurity: No Food Insecurity (2023)    Food Insecurity     Food Insecurity: Never true   Transportation Needs: No Transportation Needs (2023)    Transportation Needs     Lack of Transportation: No   Physical Activity: Insufficiently Active (2023)    Exercise Vital Sign     Days of Exercise per Week: 3 days      Minutes of Exercise per Session: 20 min   Stress: No Stress Concern Present (2/16/2023)    Stress     Feeling of Stress : No   Social Connections: Socially Isolated (2/16/2023)    Social Connections     Frequency of Socialization with Friends and Family: 0   Housing Stability: Low Risk  (2/16/2023)    Housing Stability     Housing Instability: No        Objective:   Physical Exam  Constitutional:       General: She is not in acute distress.     Appearance: She is well-developed. She is obese. She is not ill-appearing, toxic-appearing or diaphoretic.   HENT:      Head: Normocephalic and atraumatic.      Right Ear: External ear normal.      Left Ear: External ear normal.      Nose: Nose normal.      Mouth/Throat:      Pharynx: No oropharyngeal exudate.   Eyes:      General:         Right eye: No discharge.         Left eye: No discharge.      Conjunctiva/sclera: Conjunctivae normal.      Pupils: Pupils are equal, round, and reactive to light.   Neck:      Vascular: No JVD.   Cardiovascular:      Rate and Rhythm: Normal rate and regular rhythm.      Heart sounds: Normal heart sounds.   Pulmonary:      Effort: Pulmonary effort is normal. No respiratory distress.      Breath sounds: Normal breath sounds. No wheezing or rales.   Abdominal:      General: Bowel sounds are normal. There is no distension.      Palpations: Abdomen is soft. There is no mass.      Tenderness: There is no abdominal tenderness. There is no guarding or rebound.   Musculoskeletal:         General: No tenderness. Normal range of motion.      Cervical back: Normal range of motion and neck supple. No rigidity or tenderness.      Right lower leg: No edema.      Left lower leg: No edema.   Lymphadenopathy:      Cervical: No cervical adenopathy.   Skin:     General: Skin is warm and dry.      Coloration: Skin is not jaundiced or pale.      Findings: No rash.   Neurological:      Mental Status: She is alert and oriented to person, place, and time.          Assessment & Plan:   (I50.33) Acute on chronic diastolic heart failure (HCC)  (primary encounter diagnosis)  Plan: Patient now appears to be compensated.  She will continue her diuretic as well as her blood pressure medications.  Follow-up with cardiology.  Continued low-sodium diet.    (E11.9) Controlled type 2 diabetes mellitus without complication, without long-term current use of insulin (HCC)  Plan: POC Glycohemoglobin [92887]        We did her A1c was 6.4 so her diabetes remains controlled although it did go from previously.  I told her to just monitor her sugars for now and will not put her back on glipizide at this time.  Just watch her diet.  She will call back in 2 weeks for her blood sugar readings.    (M06.9) Rheumatoid arthritis involving multiple sites, unspecified whether rheumatoid factor present ([facility])  Plan: She continues to be followed by her rheumatologist Dr. Naveen Vazquez at [facility].    (E11.69,  E78.5) Hyperlipidemia associated with type 2 diabetes mellitus  ([facility])  Plan: Continue her current cholesterol medication.    (I70.0) Thoracic aortic atherosclerosis ([facility])  Plan: Continue her current cholesterol medication.    (E66.01) Morbid (severe) obesity due to excess calories ([facility])  Plan: Continue to watch diet cut down on calories.    (N18.31) Stage 3a chronic kidney disease ([facility])  Plan: Continued avoidance of NSAIDs.    (I25.10) Coronary arteriosclerosis in native artery  Plan: She has been followed by her cardiologist Dr. Lowry has been on statin therapy as well as antiplatelet therapy.  She has no cardiac symptoms currently other than her previous bout of decompensation of her diastolic heart failure that led to her hospitalization but now has been improved.    (E11.59,  I15.2) Hypertension associated with type 2 diabetes mellitus  ([facility])  Plan: Blood pressures been an issue to her life her blood pressure has been up and down and her cardiologist felt that her systolic blood  pressure up in the 140s will be acceptable for her so well we will continue current blood pressure med.    (F41.1) Generalized anxiety disorder  Plan: She takes her Xanax as needed.    (Z91.81) Risk for falls  Plan: Fall precautions.       Orders Placed This Encounter   Procedures    POC Glycohemoglobin [82968]       Meds This Visit:  Requested Prescriptions      No prescriptions requested or ordered in this encounter       Imaging & Referrals:  None

## 2024-01-15 LAB
CARTRIDGE LOT#: ABNORMAL NUMERIC
HEMOGLOBIN A1C: 6.4 % (ref 4.3–5.6)

## 2024-01-17 ENCOUNTER — TELEPHONE (OUTPATIENT)
Dept: INTERNAL MEDICINE CLINIC | Facility: CLINIC | Age: 89
End: 2024-01-17

## 2024-01-17 DIAGNOSIS — I50.33 ACUTE ON CHRONIC DIASTOLIC HEART FAILURE (HCC): Primary | ICD-10-CM

## 2024-01-17 NOTE — TELEPHONE ENCOUNTER
Nella- Occupational Therapist from [facility] called back to provide [facility] [employer] as the Oxygen Supplier. Their number is [phone].

## 2024-01-17 NOTE — TELEPHONE ENCOUNTER
Roxane calling asking if we can assist in helping patient to get O2 at home or for outing that is portable. Roxane reports that patient has anxiety and frustration around wearing the oxygen in home and out of home because it's hard to carry around with her and prohibits her from doing what she wants. Patient reported to roxane today that she was at an outing and she went without the oxygen because it was too hard to bring the other with her.     Roxane will find out who currently is supplying her the oxygen so that we can reach out or order portable tanks for her.     Will await call back.

## 2024-01-18 NOTE — TELEPHONE ENCOUNTER
Dme order pended, please add diagnosis, sign & return to triage    Calling Nella HOUGH     Called  patient & she said she is on 3 liters.    [facility] as the Oxygen Supplier.     Their number is [phone].  fax#[phone]

## 2024-01-19 NOTE — TELEPHONE ENCOUNTER
Received additional request from [employer].   Faxed signed order form (theirs)  Dme, recent office visit and demographics.  For portable oxygen tanks

## 2024-01-26 ENCOUNTER — TELEPHONE (OUTPATIENT)
Dept: INTERNAL MEDICINE CLINIC | Facility: CLINIC | Age: 89
End: 2024-01-26

## 2024-01-26 NOTE — TELEPHONE ENCOUNTER
Patient calling with blood sugar readings for provider.       1/12   128  1/13   120  1/14 127  1/15 112  1/17 111  1/19 141  1/22 122  1/26 121

## 2024-01-26 NOTE — TELEPHONE ENCOUNTER
Her sugars are still in good range so contnue with just diet. We can recheck her dm again in June 2024.

## 2024-01-26 NOTE — TELEPHONE ENCOUNTER
Patient contacted and made aware of Dr. Marmolejo's interpretation and recommendations. Patient verbalized understanding. No further questions or concerns at this time.

## 2024-02-05 ENCOUNTER — TELEPHONE (OUTPATIENT)
Dept: INTERNAL MEDICINE CLINIC | Facility: CLINIC | Age: 89
End: 2024-02-05

## 2024-02-05 NOTE — TELEPHONE ENCOUNTER
Nella Veloz [facility] calling to state will be discharging patient from occupational therapy today, 02/05/24. Also stated have been working on functional transfers throughout house without oxygen, is asking if patient can be weaned down oxygen as patient is not a fan.

## 2024-02-05 NOTE — TELEPHONE ENCOUNTER
Ok to wean off supplemental oxygen as long as her O2 saturation continues to be good and above 90%

## 2024-02-08 ENCOUNTER — MED REC SCAN ONLY (OUTPATIENT)
Dept: INTERNAL MEDICINE CLINIC | Facility: CLINIC | Age: 89
End: 2024-02-08

## 2024-02-08 ENCOUNTER — TELEPHONE (OUTPATIENT)
Dept: INTERNAL MEDICINE CLINIC | Facility: CLINIC | Age: 89
End: 2024-02-08

## 2024-02-08 NOTE — TELEPHONE ENCOUNTER
Respiratory and DME order form received from [facility], placed on Dr Marmolejo's desk for review and signature.

## 2024-02-09 ENCOUNTER — OFFICE VISIT (OUTPATIENT)
Dept: INTERNAL MEDICINE CLINIC | Facility: CLINIC | Age: 89
End: 2024-02-09

## 2024-02-09 VITALS
TEMPERATURE: 97 F | BODY MASS INDEX: 32.3 KG/M2 | SYSTOLIC BLOOD PRESSURE: 124 MMHG | HEIGHT: 62 IN | WEIGHT: 175.5 LBS | DIASTOLIC BLOOD PRESSURE: 64 MMHG | OXYGEN SATURATION: 99 % | HEART RATE: 73 BPM

## 2024-02-09 DIAGNOSIS — E11.9 CONTROLLED TYPE 2 DIABETES MELLITUS WITHOUT COMPLICATION, WITHOUT LONG-TERM CURRENT USE OF INSULIN (HCC): ICD-10-CM

## 2024-02-09 DIAGNOSIS — I50.32 CHRONIC DIASTOLIC HEART FAILURE (HCC): Primary | ICD-10-CM

## 2024-02-09 PROCEDURE — 99213 OFFICE O/P EST LOW 20 MIN: CPT | Performed by: INTERNAL MEDICINE

## 2024-02-11 NOTE — PROGRESS NOTES
Subjective:     Patient ID: Lucie Schuster is a 88 year old female.    Patient presents today for ffup.  Pt seen last visit for posthospital ffup, was admitted for acute on chronic diastolic heart failure. She states feeling better, but had been asking about when she can get off her home O2. She states she does check her O2 sat at home and lowest she had was about 92%; she had been using her O2 at 3L intermittently. She states she does feel mildly winded when she is ambulating at home.         History/Other:   Review of Systems   Constitutional: Negative.    Respiratory:  Positive for shortness of breath. Negative for cough and wheezing.         Mild exertional dyspnea   Cardiovascular: Negative.    Genitourinary: Negative.      Current Outpatient Medications   Medication Sig Dispense Refill    ALPRAZolam 0.25 MG Oral Tab Take 1 tablet (0.25 mg total) by mouth daily as needed for Anxiety. 30 tablet 0    Potassium Chloride ER 10 MEQ Oral Tab CR       omeprazole 20 MG Oral Capsule Delayed Release Take 1 capsule (20 mg total) by mouth before breakfast. 90 capsule 1    gabapentin 300 MG Oral Cap Take 1 capsule (300 mg total) by mouth 3 (three) times daily. 270 capsule 3    ezetimibe 10 MG Oral Tab Take 1 tablet (10 mg total) by mouth nightly. 90 tablet 3    amLODIPine Besylate 5 MG Oral Tab Take by mouth.      furosemide 20 MG Oral Tab Take 1 tablet (20 mg total) by mouth daily as needed.      Metoprolol Succinate ER 50 MG Oral Tablet 24 Hr Take by mouth.      LOSARTAN POTASSIUM 50 MG Oral Tab TAKE 1 TABLET BY MOUTH EVERY DAY 90 tablet 0    Multiple Vitamins-Minerals (EYE VITAMINS) Oral Cap Take 1 capsule by mouth daily.      folic acid 1 MG Oral Tab Take 1 tablet (1 mg total) by mouth daily. 90 tablet 0    aspirin 81 MG Oral Tab Take 1 tablet (81 mg total) by mouth daily.      Calcium Carb-Cholecalciferol (CALCIUM + D3) 600-200 MG-UNIT Oral Tab Take 1 tablet by mouth daily.      methotrexate (RHEUMATREX) 2.5 MG Oral  Tab Take 1 tablet (2.5 mg total) by mouth. TAKE 6 TAB ONCE A WEEK. PATIENT TAKING EVERY Thursday.      Red Yeast Rice 600 MG Oral Cap Take 2 tablets by mouth daily.      ketoconazole 2 % External Cream Apply 1 Application topically 2 (two) times daily. (Patient not taking: Reported on 1/11/2024) 60 g 0    fluticasone propionate 50 MCG/ACT Nasal Suspension 2 sprays by Nasal route daily. (Patient not taking: Reported on 2/9/2024) 48 mL 3    losartan 100 MG Oral Tab  (Patient not taking: Reported on 2/9/2024)       Allergies:  Allergies   Allergen Reactions    Sulfa Antibiotics SWELLING    Atorvastatin MYALGIA    Irbesartan MYALGIA    Rosuvastatin MYALGIA    Amoxicillin OTHER (SEE COMMENTS)     Yeast infection    Bactrim ITCHING    Clavulanic Acid OTHER (SEE COMMENTS)     Yeast infection    Codeine HIVES    Cefprozil ITCHING     Yeast infection    Lisinopril Coughing       Past Medical History:   Diagnosis Date    Acute subendocardial infarction, subsequent episode of care (HCC) 11/25/2020    Acute, but ill-defined, cerebrovascular disease     had stroke left frontal, parietal, occipital    Anxiety     Atherosclerosis of coronary artery     Carotid stenosis     Carpal tunnel syndrome, right 5/21/2016    Diabetes (HCC)     as per NG    Esophageal reflux     as per NG    Lumbar spinal stenosis     Obesity     Osteoarthritis     Other and unspecified hyperlipidemia     as per NG    Rheumatoid arthritis ([facility])     Status post carotid endarterectomy 4/13/2015    Type II or unspecified type diabetes mellitus without mention of complication, not stated as uncontrolled     Unspecified essential hypertension     as per NG      Past Surgical History:   Procedure Laterality Date    ANESTH,SURGERY OF SHOULDER Right     ANGIOPLASTY (CORONARY)      Angioplasty with Stent; as per NG    CHOLECYSTECTOMY      COLONOSCOPY      ELECTROCARDIOGRAM, COMPLETE  03-    SCANNED TO MEDIA TAB- 03-    KNEE REPLACEMENT SURGERY       OTHER SURGICAL HISTORY        Family History   Problem Relation Age of Onset    Kidney Disease Father         as per     Lipids Mother         Hyperlipidemia; as per     Hypertension Mother         as per     Heart Disease Mother         CAD; as per       Social History:   Social History     Socioeconomic History    Marital status:    Tobacco Use    Smoking status: Former     Types: Cigarettes     Quit date: 1995     Years since quittin.1     Passive exposure: Past    Smokeless tobacco: Never   Vaping Use    Vaping Use: Never used   Substance and Sexual Activity    Alcohol use: No     Alcohol/week: 0.0 standard drinks of alcohol    Drug use: No   Other Topics Concern    Caffeine Concern Yes     Comment: Coffee, 1 cup; as per      Social Determinants of Health     Financial Resource Strain: Low Risk  (2023)    Financial Resource Strain     Difficulty of Paying Living Expenses: Not very hard     Med Affordability: No   Food Insecurity: No Food Insecurity (2023)    Food Insecurity     Food Insecurity: Never true   Transportation Needs: No Transportation Needs (2023)    Transportation Needs     Lack of Transportation: No   Physical Activity: Insufficiently Active (2023)    Exercise Vital Sign     Days of Exercise per Week: 3 days     Minutes of Exercise per Session: 20 min   Stress: No Stress Concern Present (2023)    Stress     Feeling of Stress : No   Social Connections: Socially Isolated (2023)    Social Connections     Frequency of Socialization with Friends and Family: 0   Housing Stability: Low Risk  (2023)    Housing Stability     Housing Instability: No        Objective:   Physical Exam  Constitutional:       General: She is not in acute distress.     Appearance: She is obese. She is not ill-appearing, toxic-appearing or diaphoretic.   Cardiovascular:      Rate and Rhythm: Normal rate and regular rhythm.      Heart sounds: Normal heart sounds. No  murmur heard.  Pulmonary:      Effort: Pulmonary effort is normal. No respiratory distress.      Breath sounds: Normal breath sounds. No wheezing or rales.   Abdominal:      General: Bowel sounds are normal. There is no distension.      Palpations: Abdomen is soft. There is no mass.      Tenderness: There is no abdominal tenderness. There is no guarding.   Musculoskeletal:      Right lower leg: No edema.      Left lower leg: No edema.   Neurological:      Mental Status: She is alert.         Assessment & Plan:   (I50.32) Chronic diastolic heart failure (HCC)  (primary encounter diagnosis)  Plan: pt appears to be compensated currently; her O2 sat resting was upto 97% off O2 however she still feels SOB when she is walking/ambulating at home. I told her will keep her O2 for now and see how she does next 1 to 2 mos.  She will also be ffup with her cardio Dr Lowry this month; continue her diuretic and bp meds.     (E11.9) Controlled type 2 diabetes mellitus without complication, without long-term current use of insulin ([facility])  Plan: continue to monitor glucose; her glucose readings had been in good range with diet alone. Pt told to call back in4 weeks for glucose readings again.        No orders of the defined types were placed in this encounter.      Meds This Visit:  Requested Prescriptions      No prescriptions requested or ordered in this encounter       Imaging & Referrals:  None

## 2024-02-16 ENCOUNTER — PATIENT OUTREACH (OUTPATIENT)
Dept: CASE MANAGEMENT | Age: 89
End: 2024-02-16

## 2024-02-16 DIAGNOSIS — Z91.81 RISK FOR FALLS: ICD-10-CM

## 2024-02-16 DIAGNOSIS — E11.9 CONTROLLED TYPE 2 DIABETES MELLITUS WITHOUT COMPLICATION, WITHOUT LONG-TERM CURRENT USE OF INSULIN (HCC): Primary | ICD-10-CM

## 2024-02-16 DIAGNOSIS — E66.01 MORBID (SEVERE) OBESITY DUE TO EXCESS CALORIES (HCC): ICD-10-CM

## 2024-02-16 DIAGNOSIS — I25.10 CORONARY ARTERIOSCLEROSIS IN NATIVE ARTERY: ICD-10-CM

## 2024-02-16 DIAGNOSIS — F41.1 GENERALIZED ANXIETY DISORDER: ICD-10-CM

## 2024-02-16 NOTE — PROGRESS NOTES
Spoke to Lucie for CCM.      Updates to patient care team/comments: Dr. Vance Jenkins added to care team. Detailed message left for his office requesting copies of last DM foot exam.    Patient reported changes in medications: None    Med Adherence  Comment: Pt confirms she  is taking medications as instructed by providers.     Health Maintenance: Reviewed with patient.  Health Maintenance   Topic Date Due    Diabetes Care Dilated Eye Exam  08/20/2022  Pt will call Dr. Florez office to obtain an appointment.    Diabetes Care Foot Exam  08/24/2022 Foot [facility] Last week Dr. Woodard    COVID-19 Vaccine (4 - 2023-24 season) 09/01/2023 Declined    Diabetes Care: Microalb/Creat Ratio  10/10/2023    Annual Physical  03/28/2024    Zoster Vaccines (1 of 2) 10/26/2028 (Originally 7/16/1985)    Diabetes Care A1C  07/15/2024    Diabetes Care: GFR  09/25/2024    Influenza Vaccine  Completed    Annual Depression Screening  Completed    Fall Risk Screening (Annual)  Completed    Pneumococcal Vaccine: 65+ Years  Completed     Patient updates/concerns:    The patient reports she is feeling better and given approval to discontinue the use of oxygen.    Patient children were looking for ways for her to increase her activity level and bought her a motorized scooter. She tested it out a few days ago on the grass, but unfortunately. It got stuck causing her to fall.     Goals/Action Plan:    Active goal from previous outreach:   What: Improve my sleep     When: as soon as possible     How:  By controlling my anxiety and reaching out to Dr. Marmolejo for advise.     How Often: as needed     Where: at home     Patient reported progress towards goals: Pt reports improvement with sleep.               - What: Pt is sleeping better.            - Where/When/How: Pt is taking Alprazolam at night allowing her mind to rest and sleep better.  Patient Reported Barriers and Concerns: Pt does not have any new barriers associated to goal                    - Plan for overcoming barriers: n/a    NEW GOAL:   What: Look for different ways to increased my activity.  Where/When/How-She will speak with her son about putting benches in her front lawn and side driveway to allow her to walk out doors more and stop and rest when she needs a break.     Care Managers Interventions:   Encouraged three balanced meals along with daily exercise and stretching.     Care every where updated    Provided support. Encouraged patient to call as needed.    Reviewed Future Appointments: No future appointments.    Next Care Manager Follow Up Date: One month. Encouraged patient to call as needed.    Reason For Follow Up: review progress and or barriers towards patient's goals.     Time Spent This Encounter Total: 25 min medical record review, telephone communication, care plan updates where needed, education, goals, and action plan recreation/update. Provided acknowledgment and validation to patient's concerns.   Monthly Minute Total including today: 25 min  Physical assessment, complete health history, and need for CCM established by Narayan Marmolejo MD.

## 2024-02-19 ENCOUNTER — TELEPHONE (OUTPATIENT)
Dept: INTERNAL MEDICINE CLINIC | Facility: CLINIC | Age: 89
End: 2024-02-19

## 2024-02-19 DIAGNOSIS — F41.1 GENERALIZED ANXIETY DISORDER: ICD-10-CM

## 2024-02-19 NOTE — TELEPHONE ENCOUNTER
As long as her O2 saturation at home is above 92%, then we can then discontinue her O2 as being requested by [facility] who supplies her O2.

## 2024-02-19 NOTE — TELEPHONE ENCOUNTER
Pt called and the company [employer] is releasing her from oxygen but needs ok from . please advise

## 2024-02-20 NOTE — TELEPHONE ENCOUNTER
Called [employer] at [phone], spoke with Franny and obtained fax number where letter can be faxed. Letter faxed to [phone], confirmation received.

## 2024-02-20 NOTE — TELEPHONE ENCOUNTER
Spoke with pt, verified , informed pt of Dr Marmolejo's message below, pt verbalized understanding.

## 2024-02-20 NOTE — TELEPHONE ENCOUNTER
[facility] is requesting a letter stating that patient no longer needs oxygen and is to discontinue.  They need to receive this letter in order to  machine.     Patient provided phone number:   [phone]    Patient does not have fax number but would you like you to call the number above to obtain the information you need to send this letter.     Thank you.

## 2024-02-21 NOTE — TELEPHONE ENCOUNTER
Protocol Failed/ No Protocol    Requested Prescriptions   Pending Prescriptions Disp Refills    ALPRAZOLAM 0.25 MG Oral Tab [Pharmacy Med Name: ALPRAZOLAM 0.25 MG TABLET] 30 tablet 0     Sig: TAKE 1 TABLET BY MOUTH DAILY AS NEEDED FOR ANXIETY       Controlled Substance Medication Failed - 2/19/2024  2:51 PM        Failed - This medication is a controlled substance - forward to provider to refill             Future Appointments         Provider Department Appt Notes    In 2 months Narayan Marmolejo MD [facility]           Recent Outpatient Visits              1 week ago Chronic diastolic heart failure (HCC)    [facility] Narayan Marmolejo MD    Office Visit    1 month ago [facility] on chronic diastolic heart failure [facility]    [facility] Visit    9 months ago [facility]    [facility] Visit    11 months ago Medicare annual wellness visit, subsequent    [facility] Narayan Salgado MD    Office Visit    1 year ago [facility] on chronic diastolic heart failure [facility]HCC[facility]    [facility] Visit

## 2024-02-22 RX ORDER — ALPRAZOLAM 0.25 MG/1
0.25 TABLET ORAL DAILY PRN
Qty: 30 TABLET | Refills: 0 | Status: SHIPPED | OUTPATIENT
Start: 2024-02-22

## 2024-02-22 NOTE — TELEPHONE ENCOUNTER
do you approve #[id number] with #3 refills?    Refill pass per protocol    Requested Prescriptions   Pending Prescriptions Disp Refills    GABAPENTIN 300 MG Oral Cap [Pharmacy Med Name: Gabapentin 300 MG Oral Capsule] 270 capsule 3     Sig: TAKE 1 CAPSULE BY MOUTH 3 TIMES  DAILY       Neurology Medications Passed - 2/21/2024  9:12 PM        Passed - In person appointment or virtual visit in the past 6 mos or appointment in next 3 mos     Recent Outpatient Visits              1 week ago [facility]    [facility]    1 month ago Acute on chronic diastolic heart failure ([facility]    Office Visit    9 months ago Community acquired pneumonia, unspecified laterality    [facility], Narayan Salgado MD    Office Visit    11 months ago Medicare annual wellness visit, subsequent    [facility]    Office Visit    1 year ago Acute on chronic diastolic heart failure ([facility])    [facility] Narayan Salgado [facility]    Office Visit          Future Appointments         Provider Department Appt Notes    In 2 months Narayan Marmolejo MD [place]

## 2024-02-23 RX ORDER — GABAPENTIN 300 MG/1
300 CAPSULE ORAL 3 TIMES DAILY
Qty: 270 CAPSULE | Refills: 1 | Status: SHIPPED | OUTPATIENT
Start: 2024-02-23

## 2024-03-08 ENCOUNTER — TELEPHONE (OUTPATIENT)
Dept: INTERNAL MEDICINE CLINIC | Facility: CLINIC | Age: 89
End: 2024-03-08

## 2024-03-08 NOTE — TELEPHONE ENCOUNTER
Olga from [facility] is calling to advise the patient was admitted to [facility] on 3/6/24 for influenza.   Patient will most likely be discharged on 3/9/24.  Will PCP sign order for home health care and manage home health after patient is discharged to home.     Okay to leave a voicemail if unable to answer the call back.

## 2024-03-12 ENCOUNTER — PATIENT OUTREACH (OUTPATIENT)
Dept: CASE MANAGEMENT | Age: 89
End: 2024-03-12

## 2024-03-12 NOTE — PROGRESS NOTES
Reviewed pt's chart including recent visits.  Attempted to contact pt for monthly outreach no answer left detailed message for pt to call back.     Reconciled chart using care everywhere.     Pt is due for:  Health Maintenance   Topic Date Due    Diabetes Care Dilated Eye Exam  08/20/2022    Diabetes Care Foot Exam  08/24/2022    COVID-19 Vaccine (4 - 2023-24 season) 09/01/2023    Diabetes Care: Microalb/Creat Ratio  10/10/2023    Annual Physical  03/28/2024    Zoster Vaccines (1 of 2) 10/26/2028 (Originally 7/16/1985)    Diabetes Care A1C  07/15/2024    Diabetes Care: GFR  09/25/2024    Influenza Vaccine  Completed    Annual Depression Screening  Completed    Fall Risk Screening (Annual)  Completed    Pneumococcal Vaccine: 65+ Years  Completed     Future Appointments   Date Time Provider Department Center   4/25/2024 11:00 AM Narayan Marmolejo MD [facility]       Total time - 3 min  Total Monthly time- 3 min

## 2024-03-18 ENCOUNTER — PATIENT OUTREACH (OUTPATIENT)
Dept: CASE MANAGEMENT | Age: 89
End: 2024-03-18

## 2024-03-18 DIAGNOSIS — N18.31 STAGE 3A CHRONIC KIDNEY DISEASE (HCC): ICD-10-CM

## 2024-03-18 DIAGNOSIS — E11.9 CONTROLLED TYPE 2 DIABETES MELLITUS WITHOUT COMPLICATION, WITHOUT LONG-TERM CURRENT USE OF INSULIN (HCC): Primary | ICD-10-CM

## 2024-03-18 DIAGNOSIS — E11.69 HYPERLIPIDEMIA ASSOCIATED WITH TYPE 2 DIABETES MELLITUS (HCC): ICD-10-CM

## 2024-03-18 DIAGNOSIS — E78.5 HYPERLIPIDEMIA ASSOCIATED WITH TYPE 2 DIABETES MELLITUS (HCC): ICD-10-CM

## 2024-03-18 DIAGNOSIS — I25.10 CORONARY ARTERIOSCLEROSIS IN NATIVE ARTERY: ICD-10-CM

## 2024-03-18 NOTE — PROGRESS NOTES
Spoke to Lucie for CCM.      Updates to patient care team/comments: None  Patient reported changes in medications: She took her last dose this morning.  CIPROFLOXACIN  MG TAB New  Add as: ciprofloxacin 500 MG Oral Tab  TAKE 1 TABLET BY MOUTH EVERY 12 HOURS FOR 7 DAYS     Med Adherence  Comment: Pt confirms she  is taking medications as instructed by providers.     Health Maintenance:   Health Maintenance   Topic Date Due    Diabetes Care Dilated Eye Exam  08/20/2022 Left message for Dr. Florez office to fax progress notes to Dr. Marmolejo.    Diabetes Care Foot Exam  08/24/2022    COVID-19 Vaccine (4 - 2023-24 season) 09/01/2023 Declined    Diabetes Care: Microalb/Creat Ratio  10/10/2023    Annual Physical  03/28/2024 Scheduled    Zoster Vaccines (1 of 2) 10/26/2028 (Originally 7/16/1985)    Diabetes Care A1C  07/15/2024    Diabetes Care: GFR  09/25/2024    Influenza Vaccine  Completed    Annual Depression Screening  Completed    Fall Risk Screening (Annual)  Completed    Pneumococcal Vaccine: 65+ Years  Completed     Patient updates/concerns:     The patient reports being hospitalized at [facility] around March 6, although she doesn't remember the precise date. She was diagnosed with Influenza and a UTI, and was also put back on Oxygen currently using 2 liters continuously at home. She took her final dose of antibiotics this morning and notes feeling better.    Goals/Action Plan:    Active goal from previous outreach:    What: Look for different ways to increased my activity.  Where/When/How-She will speak with her son about putting benches in her front lawn and side driveway to allow her to walk out doors more and stop and rest when she needs a break.   Patient reported progress towards goals               - What:She is receiving PT at home.           - Where/When/How: She is receiving PT at home once a week and works on exercises on her own daily.  Patient Reported Barriers and Concerns: She is on  2 liters of continues oxygen. States she worries about falling because of tubing.                   - Plan for overcoming barriers: She will speak with her son and daughter about ways to keep the tubbing secured on the floor closest to the wall to prevent tripping with long cords.     Care Managers Interventions:     Encouraged three balanced meals along with daily exercise and stretching.     Reviewed pending lab orders scheduled to  by end of March.     Care every where updated    Social determinants of health updated     Provided support. Encouraged patient to call as needed.    Reviewed Future Appointments:   Future Appointments   Date Time Provider Department Center   2024 11:00 AM Narayan Marmolejo MD [facility]     Next Care Manager Follow Up Date: One month. Encouraged patient to call as needed.    Reason For Follow Up: review progress and or barriers towards patient's goals.     Time Spent This Encounter Total: 29 min medical record review, telephone communication, care plan updates where needed, education, goals, and action plan recreation/update. Provided acknowledgment and validation to patient's concerns.   Monthly Minute Total including today: 29 min  Physical assessment, complete health history, and need for CCM established by Narayan Marmolejo MD.

## 2024-03-26 DIAGNOSIS — F41.1 GENERALIZED ANXIETY DISORDER: ICD-10-CM

## 2024-03-27 ENCOUNTER — MED REC SCAN ONLY (OUTPATIENT)
Dept: INTERNAL MEDICINE CLINIC | Facility: CLINIC | Age: 89
End: 2024-03-27

## 2024-03-28 RX ORDER — ALPRAZOLAM 0.25 MG/1
0.25 TABLET ORAL DAILY PRN
Qty: 30 TABLET | Refills: 0 | Status: SHIPPED | OUTPATIENT
Start: 2024-03-28

## 2024-03-28 NOTE — TELEPHONE ENCOUNTER
Please review; protocol failed/No Protocol    LOV: 02/09/2024    Fill dates: 12/10/2023, 01/17/2024, 02/22/2024    Requested Prescriptions   Pending Prescriptions Disp Refills    ALPRAZOLAM 0.25 MG Oral Tab [Pharmacy Med Name: ALPRAZOLAM 0.25 MG TABLET] 30 tablet 0     Sig: TAKE 1 TABLET BY MOUTH DAILY AS NEEDED FOR ANXIETY       Controlled Substance Medication Failed - 3/26/2024 12:37 PM        Failed - This medication is a controlled substance - forward to provider to refill           Future Appointments         Provider Department Appt Notes    In 4 weeks Narayan Marmolejo MD [facility] update foot exam          Recent Outpatient Visits              1 month ago [facility]    Office Visit    2 months ago Acute on chronic diastolic heart failure [facility]HCC[facility]    Office Visit    10 months ago [facility] acquired pneumonia, unspecified laterality    [facility]    Office Visit    1 year ago Medicare annual wellness visit, subsequent    [facility]    Office Visit    1 year ago Acute on chronic diastolic heart failure (HCC)    [facility]    [facility] Visit

## 2024-04-08 DIAGNOSIS — E11.69 HYPERLIPIDEMIA ASSOCIATED WITH TYPE 2 DIABETES MELLITUS (HCC): ICD-10-CM

## 2024-04-08 DIAGNOSIS — E78.5 HYPERLIPIDEMIA ASSOCIATED WITH TYPE 2 DIABETES MELLITUS (HCC): ICD-10-CM

## 2024-04-09 RX ORDER — EZETIMIBE 10 MG/1
10 TABLET ORAL NIGHTLY
Qty: 90 TABLET | Refills: 3 | Status: SHIPPED | OUTPATIENT
Start: 2024-04-09

## 2024-04-09 NOTE — TELEPHONE ENCOUNTER
Please review. Protocol Failed; No Protocol    No Active/ Future labs pended   Requested Prescriptions   Pending Prescriptions Disp Refills    ezetimibe 10 MG Oral Tab 90 tablet 3     Sig: Take 1 tablet (10 mg total) by mouth nightly.       Cholesterol Medication Protocol Failed - 4/8/2024 10:58 AM        Failed - ALT < 80     Lab Results   Component Value Date    ALT 18 10/10/2022             Failed - ALT resulted within past year        Failed - Lipid panel within past 12 months     Lab Results   Component Value Date    CHOLEST 148 10/10/2022    TRIG 150 (H) 10/10/2022    HDL 46 10/10/2022    LDL 76 10/10/2022    VLDL 23 10/10/2022    NONHDLC 102 10/10/2022             Passed - In person appointment or virtual visit in the past 12 mos or appointment in next 3 mos     Recent Outpatient Visits              2 months ago Chronic diastolic heart failure (HCC)    [facility] Narayan Marmolejo MD    Office Visit    2 months ago Acute on chronic diastolic heart failure (HCC)    [facility] Narayan Marmolejo MD    Office Visit    11 months ago Community acquired pneumonia, unspecified laterality    [facility] Narayan Marmolejo MD    Office Visit    1 year ago Medicare annual wellness visit, subsequent    [facility] Narayan Marmolejo MD    Office Visit    1 year ago Acute on chronic diastolic heart failure (HCC)    [facility] Narayan Marmolejo MD    Office Visit          Future Appointments         Provider Department Appt Notes    In 2 weeks Narayan Marmolejo MD [facility] update foot exam                      Future Appointments         Provider Department Appt Notes    In 2 weeks Narayan Marmolejo MD [facility]  update foot exam          Recent Outpatient Visits              2 months ago Chronic diastolic heart failure (HCC)    [facility] [address], Narayan Salgado MD    Office Visit    2 months ago Acute on chronic diastolic heart failure (HCC)    [facility]    Office Visit    11 months ago Community acquired pneumonia, unspecified laterality    [facility] [address], Narayan Salgado MD    Office Visit    1 year ago Medicare annual wellness visit, subsequent    [facility] [address], Narayan Salgado MD    Office Visit    1 year ago Acute on chronic diastolic heart failure (HCC)    [facility]    Office Visit

## 2024-04-11 ENCOUNTER — HOSPITAL ENCOUNTER (OUTPATIENT)
Age: 89
Discharge: HOME | End: 2024-04-11
Payer: MEDICARE

## 2024-04-11 ENCOUNTER — APPOINTMENT (OUTPATIENT)
Dept: GENERAL RADIOLOGY | Age: 89
End: 2024-04-11
Attending: NURSE PRACTITIONER
Payer: MEDICARE

## 2024-04-11 VITALS
SYSTOLIC BLOOD PRESSURE: 139 MMHG | DIASTOLIC BLOOD PRESSURE: 52 MMHG | RESPIRATION RATE: 16 BRPM | HEART RATE: 80 BPM | TEMPERATURE: 98 F | OXYGEN SATURATION: 95 %

## 2024-04-11 DIAGNOSIS — M79.609 PAIN IN LIMB: Primary | ICD-10-CM

## 2024-04-11 DIAGNOSIS — M19.042 OSTEOARTHRITIS OF LEFT HAND, UNSPECIFIED OSTEOARTHRITIS TYPE: ICD-10-CM

## 2024-04-11 DIAGNOSIS — M79.645 THUMB PAIN, LEFT: ICD-10-CM

## 2024-04-11 PROCEDURE — 99213 OFFICE O/P EST LOW 20 MIN: CPT | Performed by: NURSE PRACTITIONER

## 2024-04-11 PROCEDURE — 73130 X-RAY EXAM OF HAND: CPT | Performed by: NURSE PRACTITIONER

## 2024-04-11 RX ORDER — HYDROCODONE BITARTRATE AND ACETAMINOPHEN 5; 325 MG/1; MG/1
1 TABLET ORAL EVERY 6 HOURS PRN
Qty: 10 TABLET | Refills: 0 | Status: SHIPPED | OUTPATIENT
Start: 2024-04-11

## 2024-04-11 RX ORDER — METHYLPREDNISOLONE 4 MG/1
TABLET ORAL
Qty: 1 EACH | Refills: 0 | Status: SHIPPED | OUTPATIENT
Start: 2024-04-11

## 2024-04-11 RX ORDER — ACETAMINOPHEN 325 MG/1
650 TABLET ORAL ONCE
Status: COMPLETED | OUTPATIENT
Start: 2024-04-11 | End: 2024-04-11

## 2024-04-11 NOTE — DISCHARGE INSTRUCTIONS
Take tylenol as needed for pain. Take the steroid daily. Ice. Wear the splint. Follow up with your rheumatologist. Schedule an appointment with the hand specialist if no improvement

## 2024-04-11 NOTE — ED INITIAL ASSESSMENT (HPI)
Pt complaining of left hand pain and swelling that is worsening over the last week. Hx of arthritis but states this feels different.

## 2024-04-11 NOTE — ED PROVIDER NOTES
Patient Seen in: [facility]      History     Chief Complaint   Patient presents with    Arm or Hand Injury     Stated Complaint: L hand Pain    Subjective:   88-year-old female with CKD, type 2 diabetes, hypertension, anxiety, GERD, coronary artery disease presents from home.  She is here with her son.  Patient is here with left hand pain for about 1 week.  Increased in severity.  No associated injury.  States she has known arthritis but this feels worse.  No pain medications were taken at home.  Denies fever or wounds.  She is right-hand dominant.  She is taking methotrexate for RA.  States no longer taking diabetes medication per her primary doctor's instructions.  States her blood sugar twice a week and it has been 120    The history is provided by the patient and a relative (here with son). No  was used.         Objective:   No pertinent past medical history.          HISTORY:  Past Medical History:    Acute subendocardial infarction, subsequent episode of care (HCC)    Acute, but ill-defined, cerebrovascular disease    had stroke left frontal, parietal, occipital    Anxiety    Atherosclerosis of coronary artery    Carotid stenosis    Carpal tunnel syndrome, right    Diabetes (HCC)    as per NG    Esophageal reflux    as per NG    Lumbar spinal stenosis    Obesity    Osteoarthritis    Other and unspecified hyperlipidemia    as per NG    Rheumatoid arthritis (HCC)    Status post carotid endarterectomy    Type II or unspecified type diabetes mellitus without mention of complication, not stated as uncontrolled    Unspecified essential hypertension    as per NG      Past Surgical History:   Procedure Laterality Date    Anesth,surgery of shoulder Right     Angioplasty (coronary)      Angioplasty with Stent; as per NG    Cholecystectomy      Colonoscopy      Electrocardiogram, complete  03-    SCANNED TO MEDIA TAB- 03-    Knee replacement surgery      Other surgical  history        Family History   Problem Relation Age of Onset    Kidney Disease Father         as per     Lipids Mother         Hyperlipidemia; as per     Hypertension Mother         as per     Heart Disease Mother         CAD; as per       Social History     Socioeconomic History    Marital status:    Tobacco Use    Smoking status: Former     Current packs/day: 0.00     Types: Cigarettes     Quit date: 1995     Years since quittin.2     Passive exposure: Past    Smokeless tobacco: Never   Vaping Use    Vaping status: Never Used   Substance and Sexual Activity    Alcohol use: No     Alcohol/week: 0.0 standard drinks of alcohol    Drug use: No   Other Topics Concern    Caffeine Concern Yes     Comment: Coffee, 1 cup; as per      Social Determinants of Health     Financial Resource Strain: Low Risk  (3/18/2024)    Financial Resource Strain     Difficulty of Paying Living Expenses: Not very hard     Med Affordability: No   Food Insecurity: No Food Insecurity (2023)    Food Insecurity     Food Insecurity: Never true   Transportation Needs: No Transportation Needs (2023)    Transportation Needs     Lack of Transportation: No   Physical Activity: Insufficiently Active (2023)    Exercise Vital Sign     Days of Exercise per Week: 3 days     Minutes of Exercise per Session: 20 min   Stress: No Stress Concern Present (2023)    Stress     Feeling of Stress : No   Social Connections: Socially Isolated (2023)    Social Connections     Frequency of Socialization with Friends and Family: 0   Housing Stability: Low Risk  (2023)    Housing Stability     Housing Instability: No          No pertinent past surgical history.              No pertinent social history.            Review of Systems    Positive for stated complaint: L hand Pain  Other systems are as noted in HPI.  Constitutional and vital signs reviewed.      All other systems reviewed and negative except as noted  above.    Physical Exam     ED Triage Vitals [04/11/24 1220]   /52   Pulse 80   Resp 16   Temp 98.1 °F (36.7 °C)   Temp src Temporal   SpO2 90 %   O2 Device None (Room air)       Current:/52   Pulse 80   Temp 98.1 °F (36.7 °C) (Temporal)   Resp 16   SpO2 95%         Physical Exam  Vitals and nursing note reviewed.   Constitutional:       General: She is not in acute distress.     Appearance: Normal appearance. She is not ill-appearing or toxic-appearing.   HENT:      Head: Normocephalic and atraumatic.      Nose: Nose normal.      Mouth/Throat:      Mouth: Mucous membranes are moist.      Pharynx: Oropharynx is clear.   Eyes:      Pupils: Pupils are equal, round, and reactive to light.   Cardiovascular:      Rate and Rhythm: Normal rate and regular rhythm.      Pulses: Normal pulses.   Pulmonary:      Effort: Pulmonary effort is normal. No respiratory distress.      Breath sounds: Normal breath sounds.      Comments: Lungs clear.  No adventitious lung sounds.  No distress.  No hypoxia.  Pulse ox 95% ra. Which is normal    Abdominal:      General: Abdomen is flat.      Palpations: Abdomen is soft.   Musculoskeletal:         General: No signs of injury.      Left hand: Swelling (mild) and bony tenderness (base of left thumb, snuff box) present. No deformity. Decreased range of motion (pain with hand grasp). Normal strength. Normal sensation. Normal capillary refill. Normal pulse.      Cervical back: Normal range of motion and neck supple.      Comments: Left thumb pain. Mild swelling and warmth to carpometacarpal joint. No erythema. No wounds.    Skin:     General: Skin is warm and dry.      Capillary Refill: Capillary refill takes less than 2 seconds.   Neurological:      General: No focal deficit present.      Mental Status: She is alert and oriented to person, place, and time.      GCS: GCS eye subscore is 4. GCS verbal subscore is 5. GCS motor subscore is 6.   Psychiatric:         Mood and Affect:  Mood normal.         Behavior: Behavior normal.         Thought Content: Thought content normal.         Judgment: Judgment normal.         ED Course   Labs Reviewed - No data to display  XR HAND (MIN 3 VIEWS), LEFT (CPT=73130)    Result Date: 4/11/2024   No acute fracture or dislocation.  Osteopenia.  Advanced degenerative joint disease at the 1st carpometacarpal joint.  Mild-to-moderate degenerative joint disease elsewhere in the hand.  Old nonunited ulnar styloid process fracture.  Chondrocalcinosis.  Soft tissues are unremarkable.      Dictated by (CST): Don Laughlin MD on 4/11/2024 at 1:10 PM     Finalized by (CST): Don Laughlin MD on 4/11/2024 at 1:12 PM            A thumb spica velcro splint was applied. After application of the splint I returned and re-examined the patient. The splint was adequately immobilizing the joint/injury.  Distal circulation and sensation was intact.       MDM        Medical Decision Making  Differential diagnosis: Arthritis, gout, fracture  X-ray is negative for acute fracture.  There is osteopenia and advanced degenerative joint disease.  No previous history of gout but she does have CKD.  The area is tender to touch, mild warmth but no erythema.  No wounds.  No fever.  Not consistent with cellulitis or septic joint  Discussed arthritis versus gout with the patient.  No uric acid testing available at this site  Tylenol given for pain.  Thumb spica splint placed for comfort.  Plan of care: Tylenol, ice, Medrol Dosepak.  Patient requesting stronger pain medication.  Discussed the risk of narcotic use in elderly patients but she is adamant that she would like further pain control.  Review of chart shows that she takes Xanax for anxiety.  It was stressed that she should not be mixing the 2.  Recommend follow-up with rheumatology.  If no improvement she may see hand surgery  Results and plan of care discussed with the patient/family. They are in agreement with discharge. They  understand to follow up with their primary doctor or the referral physician for further evaluation, especially if no improvement.  Also discussed the limitations of immediate care, patient is aware that if symptoms are worse they should go to the emergency room. Verbal and written discharge instructions were given.       Problems Addressed:  Osteoarthritis of left hand, unspecified osteoarthritis type: acute illness or injury  Pain in limb: acute illness or injury  Thumb pain, left: acute illness or injury    Amount and/or Complexity of Data Reviewed  External Data Reviewed: labs.     Details: A1C 6.4 1/15/24  Radiology: ordered and independent interpretation performed. Decision-making details documented in ED Course.     Details: No fracture. Arthritis noted    Risk  OTC drugs.  Prescription drug management.        Disposition and Plan     Clinical Impression:  1. Pain in limb    2. Osteoarthritis of left hand, unspecified osteoarthritis type    3. Thumb pain, left         Disposition:  Discharge  4/11/2024  1:25 pm    Follow-up:  Narayan Marmolejo MD  [address] [place] [id number]  [phone]          Vick Garcia MD  [address] [place] [id number]  [phone]                Medications Prescribed:  Discharge Medication List as of 4/11/2024  1:25 PM        START taking these medications    Details   methylPREDNISolone (MEDROL) 4 MG Oral Tablet Therapy Pack Dosepack: take as directed, Normal, Disp-1 each, R-0

## 2024-04-19 ENCOUNTER — NURSE TRIAGE (OUTPATIENT)
Dept: INTERNAL MEDICINE CLINIC | Facility: CLINIC | Age: 89
End: 2024-04-19

## 2024-04-19 ENCOUNTER — PATIENT OUTREACH (OUTPATIENT)
Dept: CASE MANAGEMENT | Age: 89
End: 2024-04-19

## 2024-04-19 DIAGNOSIS — E11.9 CONTROLLED TYPE 2 DIABETES MELLITUS WITHOUT COMPLICATION, WITHOUT LONG-TERM CURRENT USE OF INSULIN (HCC): Primary | ICD-10-CM

## 2024-04-19 DIAGNOSIS — N18.31 STAGE 3A CHRONIC KIDNEY DISEASE (HCC): ICD-10-CM

## 2024-04-19 DIAGNOSIS — R35.0 URINARY FREQUENCY: Primary | ICD-10-CM

## 2024-04-19 DIAGNOSIS — E11.69 HYPERLIPIDEMIA ASSOCIATED WITH TYPE 2 DIABETES MELLITUS (HCC): ICD-10-CM

## 2024-04-19 DIAGNOSIS — E78.5 HYPERLIPIDEMIA ASSOCIATED WITH TYPE 2 DIABETES MELLITUS (HCC): ICD-10-CM

## 2024-04-19 DIAGNOSIS — F41.1 GENERALIZED ANXIETY DISORDER: ICD-10-CM

## 2024-04-19 NOTE — TELEPHONE ENCOUNTER
Spoke with Dr. Mathews and updated on patient condition. Advised of family reporting patient does drink 2 gin and tonic drinks every night. New orders rec'd.   Spoke with pt,  verified  Pt was informed of MD recommendation, pt stated understanding.  She will do urine test tomorrow.

## 2024-04-19 NOTE — PROGRESS NOTES
Spoke to Lucie for CCM.      Updates to patient care team/comments: None  Patient reported changes in medications: None    Med Adherence  Comment: Pt confirms she is taking medications as instructed by providers.     Health Maintenance: Reviewed and encouraged updating.   Health Maintenance   Topic Date Due    Diabetes Care Dilated Eye Exam  08/20/2022 Thre patient sees Dr. Sexton, [facility] has left several messages for progress notes to be faxed.     Diabetes Care Foot Exam  08/24/2022    COVID-19 Vaccine (4 - 2023-24 season) 09/01/2023 Declined    Diabetes Care: Microalb/Creat Ratio  10/10/2023    Annual Physical  03/28/2024 Scheduled    Zoster Vaccines (1 of 2) 10/26/2028 (Originally 7/16/1985)    Diabetes Care A1C  07/15/2024    Diabetes Care: GFR  09/25/2024    Influenza Vaccine  Completed    Annual Depression Screening  Completed    Fall Risk Screening (Annual)  Completed    Pneumococcal Vaccine: 65+ Years  Completed     Patient updates/concerns:    The patient expresses her primary concern as urinary incontinence, noting a progressing in symtoms, She now experiences nocturnal episodes of waking up with a full Depend and frequently leaks throughout the day. Despite attempting to reduce fluid intake , she continues to experience these issues. She reports her urine has a noticeable odor and is yellow un color, but denies any pain or burning sensation. Notably, she has not sought medical attention for these symtoms. Additionally she mentions a slight weight loss:she currently weights 175 lbs and is content with reduction, attributing it to following a low carb diet regimen.    Goals/Action Plan:    Active goal from previous outreach:    What: Look for different ways to increased my activity.  Where/When/How-She will speak with her son about putting benches in her front lawn and side driveway to allow her to walk out doors more and stop and rest when she needs a break.   Patient reported progress towards goals: She  continues to work towards the goal, by working on small yard work task and going to bingo. The patient has a new short term goal for this month.    New Goal:                - What: Improve incontinence           - Where/When/How: She will follow up with Dr. Marmolejo for guidance on symtoms.   Patient Reported Barriers and Concerns: the patient wants orders for a urine test, so she may drop off specimen prior to upcoming appointment.                   - Plan for overcoming barriers: CCM will assist by sending a TE    Care Managers Interventions:     I assisted the patient by sending a TE to Dr. Marmolejo office, in regards to the patients symtoms and order she is requesting.     I suggested she reduce the amount of fluids consumed in the evening, especially those containing caffeine. Additionally suggested she try and empty her bladder before bedtime. Advised the patient to call triage if symtoms worsen.     Care every where updated    Fall risk and depression screen updated    Provided support. Encouraged patient to call as needed.    Reviewed Future Appointments:   Future Appointments   Date Time Provider Department Center   4/25/2024 11:00 AM [facility] Follow Up Date: One month. Encouraged patient to call as needed.    Reason For Follow Up: review progress and or barriers towards patient's goals.     Time Spent This Encounter Total: 26 min medical record review, telephone communication, care plan updates where needed, education, goals, and action plan recreation/update. Provided acknowledgment and validation to patient's concerns.   Monthly Minute Total including today: 26 min  Physical assessment, complete health history, and need for CCM established by Narayan Marmolejo MD.,

## 2024-04-19 NOTE — TELEPHONE ENCOUNTER
The patient reports experiencing chronic urinary tract infections. In the past three days, she has noticed increased urinary incontinence, primarily at night, leaking through her Depends. Despite cutting down on fluids a few hours before bedtime, the issue persist. She would like an order for a urine test. Given her difficulty urinating on the spot, she would have to have son drop off specimen for testing.

## 2024-04-19 NOTE — TELEPHONE ENCOUNTER
Please reply to pool: EM RN TRIAGE  Action Requested: Summary for Provider     []  Critical Lab, Recommendations Needed  [x] Need Additional Advice  []   FYI    []   Need Orders  [] Need Medications Sent to Pharmacy  []  Other     SUMMARY: Patient contacted regarding urinary complaints.  Voiding large amounts over night, incontinent.  Denies flank pain, burning or fever.  Sugars running in 120s.  She is on prednisone and noticed the incontinence worsening after starting this med.  Has an appointment on 04/25 and will discuss, inquires on urine testing prior.  Dr. Marmolejo please advise.    Reason for call: Acute (Increased incontinence)  Onset: Data Unavailable                       Reason for Disposition   Urinating more frequently than usual (i.e., frequency)    Protocols used: Urinary Symptoms-A-OH

## 2024-04-25 ENCOUNTER — OFFICE VISIT (OUTPATIENT)
Dept: INTERNAL MEDICINE CLINIC | Facility: CLINIC | Age: 89
End: 2024-04-25

## 2024-04-25 VITALS
WEIGHT: 177.88 LBS | BODY MASS INDEX: 32.73 KG/M2 | HEIGHT: 62 IN | DIASTOLIC BLOOD PRESSURE: 68 MMHG | HEART RATE: 67 BPM | TEMPERATURE: 98 F | SYSTOLIC BLOOD PRESSURE: 134 MMHG | OXYGEN SATURATION: 94 %

## 2024-04-25 DIAGNOSIS — F41.1 GENERALIZED ANXIETY DISORDER: ICD-10-CM

## 2024-04-25 DIAGNOSIS — E78.5 HYPERLIPIDEMIA ASSOCIATED WITH TYPE 2 DIABETES MELLITUS (HCC): ICD-10-CM

## 2024-04-25 DIAGNOSIS — E11.9 DIABETIC EYE EXAM (HCC): ICD-10-CM

## 2024-04-25 DIAGNOSIS — M06.9 RHEUMATOID ARTHRITIS INVOLVING MULTIPLE SITES, UNSPECIFIED WHETHER RHEUMATOID FACTOR PRESENT (HCC): ICD-10-CM

## 2024-04-25 DIAGNOSIS — I15.2 HYPERTENSION ASSOCIATED WITH TYPE 2 DIABETES MELLITUS (HCC): ICD-10-CM

## 2024-04-25 DIAGNOSIS — E11.69 HYPERLIPIDEMIA ASSOCIATED WITH TYPE 2 DIABETES MELLITUS (HCC): ICD-10-CM

## 2024-04-25 DIAGNOSIS — Z00.00 MEDICARE ANNUAL WELLNESS VISIT, SUBSEQUENT: Primary | ICD-10-CM

## 2024-04-25 DIAGNOSIS — R35.0 URINARY FREQUENCY: ICD-10-CM

## 2024-04-25 DIAGNOSIS — I70.0 THORACIC AORTIC ATHEROSCLEROSIS (HCC): ICD-10-CM

## 2024-04-25 DIAGNOSIS — K21.9 GASTROESOPHAGEAL REFLUX DISEASE, UNSPECIFIED WHETHER ESOPHAGITIS PRESENT: ICD-10-CM

## 2024-04-25 DIAGNOSIS — I50.32 CHRONIC DIASTOLIC HEART FAILURE (HCC): ICD-10-CM

## 2024-04-25 DIAGNOSIS — Z01.00 DIABETIC EYE EXAM (HCC): ICD-10-CM

## 2024-04-25 DIAGNOSIS — I25.10 CORONARY ARTERIOSCLEROSIS IN NATIVE ARTERY: ICD-10-CM

## 2024-04-25 DIAGNOSIS — N18.31 STAGE 3A CHRONIC KIDNEY DISEASE (HCC): ICD-10-CM

## 2024-04-25 DIAGNOSIS — I73.9 PERIPHERAL VASCULAR DISEASE (HCC): ICD-10-CM

## 2024-04-25 DIAGNOSIS — Z91.81 RISK FOR FALLS: ICD-10-CM

## 2024-04-25 DIAGNOSIS — E11.59 HYPERTENSION ASSOCIATED WITH TYPE 2 DIABETES MELLITUS (HCC): ICD-10-CM

## 2024-04-25 DIAGNOSIS — Z98.890 S/P CAROTID ENDARTERECTOMY: ICD-10-CM

## 2024-04-25 DIAGNOSIS — E11.9 CONTROLLED TYPE 2 DIABETES MELLITUS WITHOUT COMPLICATION, WITHOUT LONG-TERM CURRENT USE OF INSULIN (HCC): ICD-10-CM

## 2024-04-25 NOTE — PROGRESS NOTES
Subjective:   Lucie Schuster is a 88 year old female who presents for a Medicare Subsequent Annual Wellness visit (Pt already had Initial Annual Wellness) and scheduled follow up of multiple significant but stable problems.       History/Other:   Fall Risk Assessment:   She has been screened for Falls and is High Risk. Fall Prevention information provided to patient in After Visit Summary.    Do you feel unsteady when standing or walking?: Yes  Do you worry about falling?: No  Have you fallen in the past year?: Yes  Were you injured?: No     Cognitive Assessment:   Abnormal  What day of the week is this?: Correct  What month is it?: Correct  What year is it?: Correct  Recall \"Ball\": Correct  Recall \"Flag\": Incorrect  Recall \"Tree\": Correct    Functional Ability/Status:   Lucie Schuster has some abnormal functions as listed below:  She has Driving difficulties based on screening of functional status. She has Walking problems based on screening of functional status. She has problems with Memory based on screening of functional status.       Depression Screening (PHQ-2/PHQ-9): PHQ-2 SCORE: 0  , done 4/25/2024            Advanced Directives:   She does have a Living Will but we do NOT have it on file in Epic.    She does have a POA but we do NOT have it on file in [employer].    Patient has Advance Care Planning documents present in EMR. Reviewed documents with patient (and family/surrogate if present).      Patient Active Problem List   Diagnosis    Hypertension associated with type 2 diabetes mellitus (HCC)    Hyperlipidemia associated with type 2 diabetes mellitus (HCC)    Controlled type 2 diabetes mellitus without complication, without long-term current use of insulin (HCC)    Generalized anxiety disorder    Medicare annual wellness visit, subsequent    Rheumatoid arthritis involving multiple sites ([facility])    Risk for falls    Coronary arteriosclerosis in native artery    Stage 3a chronic kidney disease (HCC)     Peripheral vascular disease (HCC)    Morbid (severe) obesity due to excess calories (HCC)    Gastroesophageal reflux disease    Thoracic aortic atherosclerosis (HCC)    Rectal bleeding    Chronic rhinitis    S/P carotid endarterectomy    Urinary frequency    Diabetic eye exam (HCC)    Chronic diastolic heart failure (HCC)     Allergies:  She is allergic to sulfa antibiotics, atorvastatin, irbesartan, rosuvastatin, amoxicillin, bactrim, clavulanic acid, codeine, cefprozil, and lisinopril.    Current Medications:  Outpatient Medications Marked as Taking for the 4/25/24 encounter (Office Visit) with Narayan Marmolejo MD   Medication Sig    HYDROcodone-acetaminophen 5-325 MG Oral Tab Take 1 tablet by mouth every 6 (six) hours as needed for Pain.    ezetimibe 10 MG Oral Tab Take 1 tablet (10 mg total) by mouth nightly.    ALPRAZolam 0.25 MG Oral Tab Take 1 tablet (0.25 mg total) by mouth daily as needed for Anxiety.    gabapentin 300 MG Oral Cap Take 1 capsule (300 mg total) by mouth 3 (three) times daily.    omeprazole 20 MG Oral Capsule Delayed Release Take 1 capsule (20 mg total) by mouth before breakfast.    amLODIPine Besylate 5 MG Oral Tab Take by mouth.    furosemide 20 MG Oral Tab Take 1 tablet (20 mg total) by mouth daily as needed.    Metoprolol Succinate ER 50 MG Oral Tablet 24 Hr Take by mouth.    LOSARTAN POTASSIUM 50 MG Oral Tab TAKE 1 TABLET BY MOUTH EVERY DAY    Multiple Vitamins-Minerals (EYE VITAMINS) Oral Cap Take 1 capsule by mouth daily.    folic acid 1 MG Oral Tab Take 1 tablet (1 mg total) by mouth daily.    aspirin 81 MG Oral Tab Take 1 tablet (81 mg total) by mouth daily.    Calcium Carb-Cholecalciferol (CALCIUM + D3) 600-200 MG-UNIT Oral Tab Take 1 tablet by mouth daily.    methotrexate (RHEUMATREX) 2.5 MG Oral Tab Take 1 tablet (2.5 mg total) by mouth. TAKE 6 TAB ONCE A WEEK. PATIENT TAKING EVERY Thursday.    Red Yeast Rice 600 MG Oral Cap Take 2 tablets by mouth daily.       Medical  History:  She  has a past medical history of Acute subendocardial infarction, subsequent episode of care ([facility]) (2020), Acute, but ill-defined, cerebrovascular disease, Anxiety, Atherosclerosis of coronary artery, Carotid stenosis, Carpal tunnel syndrome, right (2016), Diabetes ([facility]), Esophageal reflux, Lumbar spinal stenosis, Obesity, Osteoarthritis, Other and unspecified hyperlipidemia, Rheumatoid arthritis ([facility]), Status post carotid endarterectomy (2015), Type II or unspecified type diabetes mellitus without mention of complication, not stated as uncontrolled, and Unspecified essential hypertension.  Surgical History:  She  has a past surgical history that includes angioplasty (coronary); electrocardiogram, complete (2013); colonoscopy; cholecystectomy; knee replacement surgery; other surgical history; and anesth,surgery of shoulder (Right).   Family History:  Her family history includes Heart Disease in her mother; Hypertension in her mother; Kidney Disease in her father; Lipids in her mother.  Social History:  She  reports that she quit smoking about 29 years ago. Her smoking use included cigarettes. She has been exposed to tobacco smoke. She has never used smokeless tobacco. She reports that she does not drink alcohol and does not use drugs.    Tobacco:  She smoked tobacco in the past but quit greater than 12 months ago.  Social History     Tobacco Use   Smoking Status Former    Current packs/day: 0.00    Types: Cigarettes    Quit date: 1995    Years since quittin.3    Passive exposure: Past   Smokeless Tobacco Never        CAGE Alcohol Screen:   CAGE screening score of 0 on 2024, showing low risk of alcohol abuse.      Patient Care Team:  Narayan Marmolejo MD as PCP - General (Internal Medicine)  Chacorta He (SURGERY, ORTHOPEDIC)  Naveen Vazquez MD (RHEUMATOLOGY)  Deidra Hidalgo (UROLOGY)  Jaron Maharaj MD (UROLOGY)  Chacorta He (SURGERY,  ORTHOPEDIC)  Naveen Law (SURGERY, ORTHOPEDIC)  Nessa Sexton (OPHTHALMOLOGY)  Tanya Jacob CMA as [facility] Care Manager  Joe Lowry (CARDIOLOGY)  Teofilo Jean Baptiste as Gastroenterologist (GASTROENTEROLOGY)  Manolo Woodard DPM (PODIATRIST)    Review of Systems  GENERAL: feels well otherwise  SKIN: denies any unusual skin lesions  EYES: denies blurred vision or double vision  HEENT: denies nasal congestion, sinus pain or ST  LUNGS: denies shortness of breath with exertion  CARDIOVASCULAR: denies chest pain on exertion  GI: denies abdominal pain, denies heartburn  : denies dysuria, vaginal discharge or itching, complaint of urinary frequency chronic   MUSCULOSKELETAL: denies back pain  NEURO: denies headaches  PSYCHE: denies depression or anxiety  HEMATOLOGIC: denies hx of anemia  ENDOCRINE: denies thyroid history  ALL/ASTHMA: denies hx of allergy or asthma    Objective:   Physical Exam  General Appearance:  Alert, cooperative, no distress, appears stated age   Head:  Normocephalic, without obvious abnormality, atraumatic   Eyes:  PERRL, conjunctiva/corneas clear, EOM's intact both eyes   Ears:  Normal TM's and external ear canals, both ears   Nose: Nares normal, septum midline,mucosa normal, no drainage or sinus tenderness   Throat: Lips, mucosa, and tongue normal; teeth and gums normal   Neck: Supple, symmetrical, trachea midline, no adenopathy;  thyroid: not enlarged, symmetric, no tenderness/mass/nodules; no carotid bruit or JVD   Back:   Symmetric, no curvature, ROM normal, no CVA tenderness   Lungs:   Clear to auscultation bilaterally, respirations unlabored   Heart:  Regular rate and rhythm, S1 and S2 normal, no murmur, rub, or gallop   Abdomen:   Soft, non-tender, bowel sounds active all four quadrants,  no masses, no organomegaly   Pelvic: Deferred   Extremities: Extremities normal, atraumatic, no cyanosis or edema' monofilament testing normal both feet   Pulses: 2+ and symmetric    Skin: Skin color, texture, turgor normal, no rashes or lesions   Lymph nodes: Cervical, supraclavicular,  nodes normal   Neurologic: Normal       /68   Pulse 67   Temp 97.6 °F (36.4 °C) (Tympanic)   Ht 5' 2\" (1.575 m)   Wt 177 lb 14.4 oz (80.7 kg)   SpO2 94%   BMI 32.54 kg/m²  Estimated body mass index is 32.54 kg/m² as calculated from the following:    Height as of this encounter: 5' 2\" (1.575 m).    Weight as of this encounter: 177 lb 14.4 oz (80.7 kg).    Medicare Hearing Assessment:   Hearing Screening    Time taken: 4/25/2024  1:47 PM  Screening Method: Questionnaire  I have a problem hearing over the telephone: Yes I have trouble following the conversations when two or more people are talking at the same time: No   I have trouble understanding things on the TV: No I have to strain to understand conversations: No   I have to worry about missing the telephone ring or doorbell: Yes I have trouble hearing conversations in a noisy background such as a crowded room or restaurant: No   I get confused about where sounds come from: No I misunderstand some words in a sentence and need to ask people to repeat themselves: No   I especially have trouble understanding the speech of women and children: No I have trouble understanding the speaker in a large room such as at a meeting or place of [employer]: No   Many people I talk to seem to mumble (or don't speak clearly): No People get annoyed because I misunderstand what they say: No   I misunderstand what others are saying and make inappropriate responses: No I avoid social activities because I cannot hear well and fear I will reply improperly: No   Family members and friends have told me they think I may have hearing loss: No             Visual Acuity:   Right Eye Visual Acuity: Corrected Right Eye Chart Acuity: 20/70   Left Eye Visual Acuity: Corrected Left Eye Chart Acuity: 20/70   Both Eyes Visual Acuity: Corrected Both Eyes Chart Acuity: 20/50   Able To  Tolerate Visual Acuity: Yes        Assessment & Plan:   Lucie Schuster is a 88 year old female who presents for a Medicare Assessment.     (Z00.00) Medicare annual wellness visit, subsequent  (primary encounter diagnosis)  Plan: routine labs ordered. Pt also advised to get latest covid booster, rsv vaccine and her shingrix vaccine.    (E11.9) Controlled type 2 diabetes mellitus without complication, without long-term current use of insulin (HCC)  Plan: Microalb/Creat Ratio, Random Urine, Hemoglobin         A1C        Her last A1c 3mos ago was 6.4 so her dm is well controlled.     (E11.69,  E78.5) Hyperlipidemia associated with type 2 diabetes mellitus (HCC)  Plan: Lipid Panel, Comp Metabolic Panel (14)        Check lipid panel; continue with zetia; she had been taking red rice yeast for years to replace her statin which she had not been able to tolerate.     (E11.59,  I15.2) Hypertension associated with type 2 diabetes mellitus (HCC)  Plan: bp controlled. Cpm/    (I50.32) Chronic diastolic heart failure (HCC)  Plan: she is currently compensated and euvolemic, she had been ff by cardio.  Pt on bp meds and lasix.     (F41.1) Generalized anxiety disorder  Plan: she is stable in xanax prn.     (M06.9) Rheumatoid arthritis involving multiple sites, unspecified whether rheumatoid factor present (HCC)  Plan: she had been ff by her rheumatologist for more than 20 yrs, had been treated with methotrexate and had been controlled. Cpm.     (Z01.00,  E11.9) Diabetic eye exam (HCC)  Plan: Ophthalmology Referral - In Network        Pt told to see optha Dr Johansen for her dm eye exam.     (R35.0) Urinary frequency  Plan: Urology Referral - External        Chronic urinary frequency, had seen urologist before, and likely overactive bladder, will have her ffup with urologist.     (Z91.81) Risk for falls  Plan: fall precautions.     (K21.9) Gastroesophageal reflux disease, unspecified whether esophagitis present  Plan: she is on  PPI.    (N18.31) Stage 3a chronic kidney disease (HCC)  Plan: contnue to avoid nsaids; will check her renal panel .    (I25.10) Coronary arteriosclerosis in native artery  Plan: pt had been asymptomatic; hx of NSTEMI more than 10 yrs ago, had been treated medically, with antiplatelet tx but not able to tolerate statins so on zetia.     (I73.9) Peripheral vascular disease (HCC)  Plan: she has hx of mild PAD before and had been treated medically by her cardio. She is on antiplatelet tx and zetia since not able to tolerate statins.     (I70.0) Thoracic aortic atherosclerosis ([facility])  Plan: on zetia; intolerant of statins.     (Z98.890) S/P carotid endarterectomy  Plan: had hx of left CEA done before for left carotid stenosis. She is on zetia and antiplatleet tx since intolerant of statin.  She continues to be ff by her cardio.      The patient indicates understanding of these issues and agrees to the plan.  Reinforced healthy diet, lifestyle, and exercise.      No follow-ups on file.     Narayan Marmolejo MD, 4/25/2024     Supplementary Documentation:   General Health:  In the past six months, have you lost more than 10 pounds without trying?: 2 - No  Has your appetite been poor?: Yes  Type of Diet: Low Salt  How does the patient maintain a good energy level?: Appropriate Exercise  How would you describe your daily physical activity?: Light  How would you describe your current health state?: Fair  How do you maintain positive mental well-being?: Puzzles;Visiting Friends  On a scale of 0 to 10, with 0 being no pain and 10 being severe pain, what is your pain level?: 5 - (Moderate)  In the past six months, have you experienced urine leakage?: 1-Yes  At any time do you feel concerned for the safety/well-being of yourself and/or your children, in your home or elsewhere?: No  Have you had any immunizations at another office such as Influenza, Hepatitis B, Tetanus, or Pneumococcal?: No       Lucie Schuster's SCREENING  SCHEDULE   Tests on this list are recommended by your physician but may not be covered, or covered at this frequency, by your insurer.   Please check with your insurance carrier before scheduling to verify coverage.   PREVENTATIVE SERVICES FREQUENCY &  COVERAGE DETAILS LAST COMPLETION DATE   Diabetes Screening    Fasting Blood Sugar /  Glucose    One screening every 12 months if never tested or if previously tested but not diagnosed with pre-diabetes   One screening every 6 months if diagnosed with pre-diabetes Lab Results   Component Value Date     (H) 10/10/2022        Cardiovascular Disease Screening    Lipid Panel  Cholesterol  Lipoprotein (HDL)  Triglycerides Covered every 5 years for all Medicare beneficiaries without apparent signs or symptoms of cardiovascular disease Lab Results   Component Value Date    CHOLEST 148 10/10/2022    HDL 46 10/10/2022    LDL 76 10/10/2022    TRIG 150 (H) 10/10/2022         Electrocardiogram (EKG)   Covered if needed at Welcome to Medicare, and non-screening if indicated for medical reasons 09/25/2023      Ultrasound Screening for Abdominal Aortic Aneurysm (AAA) Covered once in a lifetime for one of the following risk factors    Men who are 65-75 years old and have ever smoked    Anyone with a family history -     Colorectal Cancer Screening  Covered for ages 50-85; only need ONE of the following:    Colonoscopy   Covered every 10 years    Covered every 2 years if patient is at high risk or previous colonoscopy was abnormal -    No recommendations at this time    Flexible Sigmoidoscopy   Covered every 4 years -    Fecal Occult Blood Test Covered annually -   Bone Density Screening    Bone density screening    Covered every 2 years after age 65 if diagnosed with risk of osteoporosis or estrogen deficiency.    Covered yearly for long-term glucocorticoid medication use (Steroids) No results found for this or any previous visit.      No recommendations at this time   Pap and  Pelvic    Pap   Covered every 2 years for women at normal risk; Annually if at high risk -  No recommendations at this time    Chlamydia Annually if high risk -  No recommendations at this time   Screening Mammogram    Mammogram     Recommend annually for all female patients aged 40 and older    One baseline mammogram covered for patients aged 35-39 -    No recommendations at this time    Immunizations    Influenza Covered once per flu season  Please get every year 12/21/2023  No recommendations at this time    Pneumococcal Each vaccine ([id number] & [id number]) covered once after 65 Prevnar 13: 09/28/2015    Qlcjirohy11: 06/26/2014     No recommendations at this time    Hepatitis B One screening covered for patients with certain risk factors   -  No recommendations at this time    Tetanus Toxoid Not covered by Medicare Part B unless medically necessary (cut with metal); may be covered with your pharmacy prescription benefits -    Tetanus, Diptheria and Pertusis TD and TDaP Not covered by Medicare Part B -  No recommendations at this time    Zoster Not covered by Medicare Part B; may be covered with your pharmacy  prescription benefits -  No recommendations at this time     Diabetes      Hemoglobin A1C Annually; if last result is elevated, may be asked to retest more frequently.    Medicare covers every 3 months Lab Results   Component Value Date     (H) 10/10/2022    A1C 6.4 (A) 01/15/2024       No recommendations at this time    Creat/alb ratio Annually Lab Results   Component Value Date    MICROALBCREA 174.0 (H) 10/10/2022       LDL Annually Lab Results   Component Value Date    LDL 76 10/10/2022       Dilated Eye Exam Annually Last Diabetic Eye Exam:  No data recorded  No data recorded       Annual Monitoring of Persistent Medications (ACE/ARB, digoxin diuretics, anticonvulsants)    Potassium Annually Lab Results   Component Value Date    K 4.2 10/10/2022         Creatinine   Annually Lab Results    Component Value Date    CREATSERUM 0.88 10/10/2022         BUN Annually Lab Results   Component Value Date    BUN 18 10/10/2022       Drug Serum Conc Annually No results found for: \"DIGOXIN\", \"DIG\", \"VALP\"

## 2024-04-28 PROBLEM — I50.32 CHRONIC DIASTOLIC HEART FAILURE (HCC): Status: ACTIVE | Noted: 2024-04-28

## 2024-04-28 PROBLEM — Z01.00 DIABETIC EYE EXAM (HCC): Status: ACTIVE | Noted: 2024-04-28

## 2024-04-28 PROBLEM — R35.0 URINARY FREQUENCY: Status: ACTIVE | Noted: 2024-04-28

## 2024-04-28 PROBLEM — Z98.890 S/P CAROTID ENDARTERECTOMY: Status: ACTIVE | Noted: 2024-04-28

## 2024-04-28 PROBLEM — E11.9 DIABETIC EYE EXAM (HCC): Status: ACTIVE | Noted: 2024-04-28

## 2024-04-30 DIAGNOSIS — F41.1 GENERALIZED ANXIETY DISORDER: ICD-10-CM

## 2024-05-01 RX ORDER — ALPRAZOLAM 0.25 MG/1
0.25 TABLET ORAL DAILY PRN
Qty: 30 TABLET | Refills: 0 | Status: SHIPPED | OUTPATIENT
Start: 2024-05-01

## 2024-05-01 NOTE — TELEPHONE ENCOUNTER
Please review. Rx failed/no protocol.    Recent fills: 03/28/2024, 02/22/2024, and 01/17/2024  Last prescription written on: 03/28/2024  Last office visit: 04/25/2024  Requested Prescriptions   Pending Prescriptions Disp Refills    ALPRAZOLAM 0.25 MG Oral Tab [Pharmacy Med Name: ALPRAZOLAM 0.25 MG TABLET] 30 tablet 0     Sig: TAKE 1 TABLET BY MOUTH DAILY AS NEEDED FOR ANXIETY       Controlled Substance Medication Failed - 5/1/2024  9:44 AM        Failed - This medication is a controlled substance - forward to provider to refill               Recent Outpatient Visits              6 days ago Medicare [facility]    Office Visit    2 months ago Chronic diastolic heart failure (HCC)    [facility], [address], Narayan Salgado MD    Office Visit    3 months ago Acute on chronic diastolic heart failure (HCC)    [facility]    Office Visit    11 months ago Community acquired pneumonia, unspecified laterality    [facility]    Office Visit    1 year ago Medicare annual wellness visit, subsequent    [facility]    Office Visit

## 2024-05-09 ENCOUNTER — PATIENT OUTREACH (OUTPATIENT)
Dept: CASE MANAGEMENT | Age: 89
End: 2024-05-09

## 2024-05-09 DIAGNOSIS — E78.5 HYPERLIPIDEMIA ASSOCIATED WITH TYPE 2 DIABETES MELLITUS (HCC): ICD-10-CM

## 2024-05-09 DIAGNOSIS — I73.9 PERIPHERAL VASCULAR DISEASE (HCC): ICD-10-CM

## 2024-05-09 DIAGNOSIS — I25.10 CORONARY ARTERIOSCLEROSIS IN NATIVE ARTERY: ICD-10-CM

## 2024-05-09 DIAGNOSIS — F41.1 GENERALIZED ANXIETY DISORDER: ICD-10-CM

## 2024-05-09 DIAGNOSIS — E11.69 HYPERLIPIDEMIA ASSOCIATED WITH TYPE 2 DIABETES MELLITUS (HCC): ICD-10-CM

## 2024-05-09 DIAGNOSIS — E11.59 HYPERTENSION ASSOCIATED WITH TYPE 2 DIABETES MELLITUS (HCC): ICD-10-CM

## 2024-05-09 DIAGNOSIS — E11.9 CONTROLLED TYPE 2 DIABETES MELLITUS WITHOUT COMPLICATION, WITHOUT LONG-TERM CURRENT USE OF INSULIN (HCC): Primary | ICD-10-CM

## 2024-05-09 DIAGNOSIS — I15.2 HYPERTENSION ASSOCIATED WITH TYPE 2 DIABETES MELLITUS (HCC): ICD-10-CM

## 2024-05-09 NOTE — PROGRESS NOTES
Spoke to Lucie for CCM.      Updates to patient care team/comments: None  Patient reported changes in medications: None    Med Adherence  Comment: Pt confirms she is taking medications as instructed by providers.     Health Maintenance:   Health Maintenance   Topic Date Due    Diabetes Care Dilated Eye Exam  08/20/2022    Diabetes Care: Microalb/Creat Ratio  10/10/2023    Diabetes Care Foot Exam  04/28/2025 (Originally 8/24/2022)    COVID-19 Vaccine (4 - 2023-24 season) 05/19/2025 (Originally 9/1/2023)    Zoster Vaccines (1 of 2) 10/26/2028 (Originally 7/16/1985)    Diabetes Care A1C  07/15/2024    Diabetes Care: GFR  09/25/2024    Annual Physical  04/25/2025    Influenza Vaccine  Completed    Annual Depression Screening  Completed    Fall Risk Screening (Annual)  Completed    Pneumococcal Vaccine: 65+ Years  Completed       Patient updates/concerns:     The patient reports generally feeling stable. She reports that she is not using her oxygen as frequently as before. While sitting and her resting her oxygen levels are consistently between 93-94. However, she has not monitored her oxygen levels while walking without oxygen. She intends to schedule an appointment with her doctor for a walking ambulatory test to potentially discontinue oxygen as she dislikes using it.    Additionally, she expressed feeling somewhat down die to concerns about her close friend possible having breast CA, with whom she planned to go on a cruise with soon.    Goals/Action Plan:    Active goal from previous outreach:   What: Look for different ways to increased my activity.  Where/When/How-She will speak with her son about putting benches in her front lawn and side driveway to allow her to walk out doors more and stop and rest when she needs a break.     Patient reported progress towards goals:                - What: She continues to work toward the goal.           - Where/When/How: The patient reports the her breathing is improving,  allowing her to walk a bit further each time. She is monitoring her oxygen levels , blood sugar and blood pressure at home, several times a week and her readings are stable.  Patient Reported Barriers and Concerns: She would like to discontinue oxygen.                    - Plan for overcoming barriers: She will speak with her son and call to schedule an appointment with Dr. Marmolejo.     Care Managers Interventions:     I advised the patient to keep monitoring her oxygen levels and to schedule an appointment with Dr. Marmolejo to discuss discontinuing its use. I also discussed caution regarding fall risk. Furthermore, I offered to schedule her an appointment. She declined, indicating she needs to work out transportation with her son first.    I provided support by empathetically listened to her concerns and  acknowledged her emotions during out conversation.      Care every where updated    Provided support. Encouraged patient to call as needed.    Reviewed Future Appointments: No future appointments.    Next Care Manager Follow Up Date: One month. Encouraged patient to call as needed.    Reason For Follow Up: review progress and or barriers towards patient's goals.     Time Spent This Encounter Total: 28 min medical record review, telephone communication, care plan updates where needed, education, goals, and action plan recreation/update. Provided acknowledgment and validation to patient's concerns.   Monthly Minute Total including today: 28 min  Physical assessment, complete health history, and need for CCM established by Narayan Marmolejo MD.

## 2024-05-18 ENCOUNTER — HOSPITAL ENCOUNTER (OUTPATIENT)
Age: 89
Discharge: HOME | End: 2024-05-18

## 2024-05-18 ENCOUNTER — APPOINTMENT (OUTPATIENT)
Dept: GENERAL RADIOLOGY | Age: 89
End: 2024-05-18
Attending: NURSE PRACTITIONER

## 2024-05-18 VITALS
SYSTOLIC BLOOD PRESSURE: 131 MMHG | TEMPERATURE: 100 F | DIASTOLIC BLOOD PRESSURE: 49 MMHG | OXYGEN SATURATION: 93 % | HEART RATE: 79 BPM | RESPIRATION RATE: 18 BRPM

## 2024-05-18 DIAGNOSIS — R05.1 ACUTE COUGH: Primary | ICD-10-CM

## 2024-05-18 LAB
S PYO AG THROAT QL: NEGATIVE
SARS-COV-2 RNA RESP QL NAA+PROBE: NOT DETECTED

## 2024-05-18 PROCEDURE — 87880 STREP A ASSAY W/OPTIC: CPT | Performed by: NURSE PRACTITIONER

## 2024-05-18 PROCEDURE — 71046 X-RAY EXAM CHEST 2 VIEWS: CPT | Performed by: NURSE PRACTITIONER

## 2024-05-18 PROCEDURE — 99214 OFFICE O/P EST MOD 30 MIN: CPT | Performed by: NURSE PRACTITIONER

## 2024-05-18 PROCEDURE — U0002 COVID-19 LAB TEST NON-CDC: HCPCS | Performed by: NURSE PRACTITIONER

## 2024-05-18 RX ORDER — AZITHROMYCIN 250 MG/1
TABLET, FILM COATED ORAL
Qty: 6 TABLET | Refills: 0 | Status: SHIPPED | OUTPATIENT
Start: 2024-05-18 | End: 2024-05-23

## 2024-05-18 NOTE — ED PROVIDER NOTES
Patient Seen in: [facility]      History     Chief Complaint   Patient presents with    Sore Throat     Stated Complaint: sore throat    Subjective:   HPI  Well-appearing 88-year-old female with a history of hypertension, RA, diabetes, GERD, coronary artery disease, CVA who presents with sore throat, hoarse voice and fever.  Patient states symptoms started yesterday.  Denies any shortness of breath or chest pain.  States today she woke up and had worsening pain with swallowing which prompted the visit.  No cough.  Denies any body aches or chills.  No difficulty swallowing.  Denies any shortness of breath or chest pain.  Chronic rhinorrhea.  No congestion or other URI symptoms.  History of pneumonia x 2 within the last 1 year.  Patient on supplemental oxygen intermittently at home.  States normally her O2 runs in the upper 80s low 90s      Objective:   Past Medical History:    Acute subendocardial infarction, subsequent episode of care (HCC)    Acute, but ill-defined, cerebrovascular disease    had stroke left frontal, parietal, occipital    Anxiety    Atherosclerosis of coronary artery    Carotid stenosis    Carpal tunnel syndrome, right    Diabetes (HCC)    as per NG    Esophageal reflux    as per NG    Lumbar spinal stenosis    Obesity    Osteoarthritis    Other and unspecified hyperlipidemia    as per NG    Rheumatoid arthritis (HCC)    Status post carotid endarterectomy    Type II or unspecified type diabetes mellitus without mention of complication, not stated as uncontrolled    Unspecified essential hypertension    as per NG              Past Surgical History:   Procedure Laterality Date    Anesth,surgery of shoulder Right     Angioplasty (coronary)      Angioplasty with Stent; as per NG    Cholecystectomy      Colonoscopy      Electrocardiogram, complete  03-    SCANNED TO MEDIA TAB- 03-    Knee replacement surgery      Other surgical history                  Social History      Socioeconomic History    Marital status:    Tobacco Use    Smoking status: Former     Current packs/day: 0.00     Types: Cigarettes     Quit date: 1995     Years since quittin.3     Passive exposure: Past    Smokeless tobacco: Never   Vaping Use    Vaping status: Never Used   Substance and Sexual Activity    Alcohol use: No     Alcohol/week: 0.0 standard drinks of alcohol    Drug use: No   Other Topics Concern    Caffeine Concern Yes     Comment: Coffee, 1 cup; as per      Social Determinants of Health     Financial Resource Strain: Low Risk  (3/18/2024)    Financial Resource Strain     Difficulty of Paying Living Expenses: Not very hard     Med Affordability: No   Food Insecurity: No Food Insecurity (2023)    Food Insecurity     Food Insecurity: Never true   Transportation Needs: No Transportation Needs (2023)    Transportation Needs     Lack of Transportation: No   Physical Activity: Insufficiently Active (2023)    Exercise Vital Sign     Days of Exercise per Week: 3 days     Minutes of Exercise per Session: 20 min   Stress: No Stress Concern Present (2023)    Stress     Feeling of Stress : No   Social Connections: Socially Isolated (2023)    Social Connections     Frequency of Socialization with Friends and Family: 0   Housing Stability: Low Risk  (2023)    Housing Stability     Housing Instability: No              Review of Systems   Constitutional:  Positive for fever.   HENT:  Positive for sore throat.    All other systems reviewed and are negative.      Positive for stated complaint: sore throat  Other systems are as noted in HPI.  Constitutional and vital signs reviewed.      All other systems reviewed and negative except as noted above.    Physical Exam     ED Triage Vitals [24 0900]   /49   Pulse 100   Resp 18   Temp 99.6 °F (37.6 °C)   Temp src Oral   SpO2 93 %   O2 Device None (Room air)       Current Vitals:   Vital Signs  BP:  131/49  Pulse: 79  Resp: 18  Temp: 99.6 °F (37.6 °C)  Temp src: Oral    Oxygen Therapy  SpO2: 93 %  O2 Device: None (Room air)        Physical Exam  Vitals and nursing note reviewed.   Constitutional:       General: She is awake. She is not in acute distress.     Appearance: Normal appearance. She is not ill-appearing, toxic-appearing or diaphoretic.   HENT:      Head: Normocephalic and atraumatic.      Right Ear: There is impacted cerumen.      Left Ear: Tympanic membrane, ear canal and external ear normal. No tenderness.  No middle ear effusion. Tympanic membrane is not erythematous.      Nose: Rhinorrhea present. Rhinorrhea is clear.      Mouth/Throat:      Lips: Pink.      Mouth: Mucous membranes are moist. No oral lesions.      Pharynx: Oropharynx is clear. Uvula midline. Posterior oropharyngeal erythema present. No pharyngeal swelling, oropharyngeal exudate or uvula swelling.      Tonsils: No tonsillar exudate or tonsillar abscesses.   Eyes:      General: Lids are normal.      Extraocular Movements: Extraocular movements intact.      Conjunctiva/sclera: Conjunctivae normal.      Pupils: Pupils are equal, round, and reactive to light.   Cardiovascular:      Rate and Rhythm: Normal rate and regular rhythm.      Pulses: Normal pulses.      Heart sounds: Normal heart sounds.   Pulmonary:      Effort: Pulmonary effort is normal.      Breath sounds: Normal breath sounds and air entry. No stridor, decreased air movement or transmitted upper airway sounds.   Musculoskeletal:      Cervical back: Full passive range of motion without pain and normal range of motion.   Skin:     General: Skin is warm and dry.      Capillary Refill: Capillary refill takes less than 2 seconds.   Neurological:      General: No focal deficit present.      Mental Status: She is alert and oriented to person, place, and time.   Psychiatric:         Mood and Affect: Mood normal.         Behavior: Behavior normal. Behavior is cooperative.          Thought Content: Thought content normal.         Judgment: Judgment normal.       ED Course     Labs Reviewed   POCT RAPID STREP - Normal   RAPID SARS-COV-2 BY PCR - Normal     COVID-negative, strep negative.  Chest x-ray ordered.    Reviewed, stable cardiomegaly.  No acute pulmonary process.  Hyperinflated lungs again noted which could relate to underlying obstructive pulmonary disease.  Personally reviewed the images.  XR CHEST PA + LAT CHEST (CPT=71046)    Result Date: 5/18/2024  CONCLUSION:  1. Stable cardiomegaly.  No acute pulmonary process. 2. Hyperinflated lungs again noted, which could relate to underlying obstructive pulmonary disease. 3. Lesser incidental findings as above.    Dictated by (CST): Lalo Cuellar MD on 5/18/2024 at 9:51 AM     Finalized by (CST): Lalo Cuellar MD on 5/18/2024 at 9:54 AM          MDM     Medical Decision Making  Patient is well-appearing on exam, nontoxic appearance, exam as noted above.  Differential diagnoses including but not limited to COPD exacerbation, pneumonia, COVID-19, viral versus bacterial pharyngitis.  I discussed with the patient and his son the x-ray findings.  Based on history and exam we did discuss empiric antibiotics which patient is requesting. She has tolerated zpak in the past. She is not hypoxic, not tachycardic. Salt water gargles, tea with honey may help soothe throat.  Follow-up with the primary care provider as recommended.  Any worsening symptoms patient should be seen in the emergency department.  Patient verbalized plan of care and states understanding.    Problems Addressed:  Acute cough: acute illness or injury    Amount and/or Complexity of Data Reviewed  Independent Historian:      Details: Son  Radiology: ordered and independent interpretation performed. Decision-making details documented in ED Course.  ECG/medicine tests: ordered and independent interpretation performed. Decision-making details documented in ED Course.    Risk  OTC  drugs.      Disposition and Plan     Clinical Impression:  1. Acute cough         Disposition:  Discharge  5/18/2024 10:08 am    Follow-up:  Narayan Marmolejo MD  [address] [place] [address]  [phone]                Medications Prescribed:  Discharge Medication List as of 5/18/2024 10:09 AM        START taking these medications    Details   azithromycin (ZITHROMAX Z-MERT) 250 MG Oral Tab 500 mg once followed by 250 mg daily x 4 days, Normal, Disp-6 tablet, R-0

## 2024-05-18 NOTE — DISCHARGE INSTRUCTIONS
Please take medication as prescribed.  I do recommend you using cough drops as well, these will soothe your throat.  Salt water gargles and tea with honey as well.  Close follow-up with your primary care provider is recommended.  Any worsening symptoms please go to the emergency department.

## 2024-06-04 ENCOUNTER — PATIENT OUTREACH (OUTPATIENT)
Dept: CASE MANAGEMENT | Age: 89
End: 2024-06-04

## 2024-06-04 DIAGNOSIS — E66.01 MORBID (SEVERE) OBESITY DUE TO EXCESS CALORIES (HCC): ICD-10-CM

## 2024-06-04 DIAGNOSIS — E11.59 HYPERTENSION ASSOCIATED WITH TYPE 2 DIABETES MELLITUS (HCC): ICD-10-CM

## 2024-06-04 DIAGNOSIS — E11.69 HYPERLIPIDEMIA ASSOCIATED WITH TYPE 2 DIABETES MELLITUS (HCC): ICD-10-CM

## 2024-06-04 DIAGNOSIS — I15.2 HYPERTENSION ASSOCIATED WITH TYPE 2 DIABETES MELLITUS (HCC): ICD-10-CM

## 2024-06-04 DIAGNOSIS — E78.5 HYPERLIPIDEMIA ASSOCIATED WITH TYPE 2 DIABETES MELLITUS (HCC): ICD-10-CM

## 2024-06-04 DIAGNOSIS — E11.9 CONTROLLED TYPE 2 DIABETES MELLITUS WITHOUT COMPLICATION, WITHOUT LONG-TERM CURRENT USE OF INSULIN (HCC): ICD-10-CM

## 2024-06-04 DIAGNOSIS — I50.32 CHRONIC DIASTOLIC HEART FAILURE (HCC): Primary | ICD-10-CM

## 2024-06-04 DIAGNOSIS — I73.9 PERIPHERAL VASCULAR DISEASE (HCC): ICD-10-CM

## 2024-06-04 NOTE — PROGRESS NOTES
Spoke to Lucie for CCM.      Updates to patient care team/comments: None   Patient reported changes in medications: None     Med Adherence  Comment: Pt confirms she is taking medications as instructed by providers.     Health Maintenance:   Health Maintenance   Topic Date Due    Diabetes Care Dilated Eye Exam  08/20/2022 Reports going to a place close to her home. She has progress notes to bring to the office.    Diabetes Care: Microalb/Creat Ratio  10/10/2023 She has pending orders.     Diabetes Care Foot Exam  04/28/2025 (Originally 8/24/2022)    COVID-19 Vaccine (4 - 2023-24 season) 05/19/2025 (Originally 9/1/2023)    Zoster Vaccines (1 of 2) 10/26/2028 (Originally [birth year]/1987)    Diabetes Care A1C  07/15/2024    Diabetes Care: GFR  09/25/2024    Annual Physical  04/25/2025    Influenza Vaccine  Completed    Annual Depression Screening  Completed    Fall Risk Screening (Annual)  Completed    Pneumococcal Vaccine: 65+ Years  Completed     Patient updates/concerns:    The patient reports that she went to urgent care two weeks ago due to a sore throat. She was prescribed a Z-pack and is feeling better now.    She mentions losing a little  bit of weight mainly because of a lack in appetite. She has noticed that she doesn't get hungry and feels full faster. However, she ensures she eats a small amount at each meal.     She continues to use the oxygen at home and monitors her oxygen levels, using it as needed.     Goals/Action Plan:    Active goal from previous outreach:   What: Look for different ways to increased my activity.  Where/When/How-She will speak with her son about putting benches in her front lawn and side driveway to allow her to walk out doors more and stop and rest when she needs a break.     Patient reported progress towards goals:                - What: The patient reports she is making an effort to increase her activity.           - Where/When/How: The patient reports she is walking short laps in her  home with the support of her supplemental oxygen.   Patient Reported Barriers and Concerns: She does not have a portable concentrator, only uses oxygen while at home.  She feels she might not need supplemental oxygen at home.                   - Plan for overcoming barriers: The patient states she will follow up in the office to discuss with Dr. Marmolejo. If she is recommended to stay on it she will inquire about portable oxygen tank.    Care Managers Interventions:     I offered to help the patient and schedule a follow up appointment with Dr. Marmolejo and reminded her about the pending blood test orders. I encouraged her to continue using her oxygen until she comes into the office to discuss further with Dr. Marmolejo.     Care every where updated    Provided support. Encouraged patient to call as needed.    Reviewed Future Appointments: No future appointments.    Next Care Manager Follow Up Date: One month. Encouraged patient to call as needed.    Reason For Follow Up: review progress and or barriers towards patient's goals.     Time Spent This Encounter Total: 27 min medical record review, telephone communication, care plan updates where needed, education, goals, and action plan recreation/update. Provided acknowledgment and validation to patient's concerns.   Monthly Minute Total including today: 27 min  Physical assessment, complete health history, and need for CCM established by Narayan Marmolejo MD.

## 2024-06-10 DIAGNOSIS — F41.1 GENERALIZED ANXIETY DISORDER: ICD-10-CM

## 2024-06-13 ENCOUNTER — TELEPHONE (OUTPATIENT)
Dept: INTERNAL MEDICINE CLINIC | Facility: CLINIC | Age: 89
End: 2024-06-13

## 2024-06-13 DIAGNOSIS — F41.1 GENERALIZED ANXIETY DISORDER: ICD-10-CM

## 2024-06-13 NOTE — TELEPHONE ENCOUNTER
Please review; protocol failed/ has no protocol      Recent fills: 05/01/2024,03/28/2024,02/22/2024  Last Rx written: 05/01/2024  Last Office Visit : 04/25/2024    Recent Visits  Date Type Provider Dept   04/25/24 Office Visit Narayan Marmolejo MD [facility]                                 Showing recent visits within past 540 days with a meds authorizing provider and meeting all other requirements  Future Appointments  No visits were found meeting these conditions.  Showing future appointments within next 150 days with a meds authorizing provider and meeting all other requirements        Jeannine Valdes, CMA3 days ago     DY  [facility]        Requested Prescriptions   Pending Prescriptions Disp Refills    folic acid 1 MG Oral Tab 90 tablet 0     Sig: Take 1 tablet (1 mg total) by mouth daily.       There is no refill protocol information for this order       ALPRAZolam 0.25 MG Oral Tab 30 tablet 0     Sig: Take 1 tablet (0.25 mg total) by mouth daily as needed for Anxiety.       Controlled Substance Medication Failed - 6/10/2024  1:15 PM        Failed - This medication is a controlled substance - forward to provider to refill           Recent Outpatient Visits              1 month ago Medicare annual wellness visit, subsequent    [facility], [address], Narayan Salgado MD    Office Visit    4 months ago Chronic diastolic heart failure (HCC)    [facility], [address], Narayan Salgado MD    Office Visit    5 months ago Acute on chronic diastolic heart failure (HCC[facility]    [facility] Visit    1 year ago Community acquired pneumonia, unspecified laterality    [facility]    [facility] Visit    1 year ago Medicare annual wellness visit, subsequent    [facility] Narayan Marmolejo MD    Office Visit

## 2024-06-13 NOTE — TELEPHONE ENCOUNTER
Patient called stating she has been on oxygen since April and she no longer wants it because her oxygen levels are between 93%-95% and she wants to know how to get rid of it.

## 2024-06-13 NOTE — TELEPHONE ENCOUNTER
Spoke with patient, Date of Birth verified  Patient stated she's been of oxygen for several weeks now and she no longer needs it.   Patient O2 sat ranges from 93-94-95 on room air.   She will call [facility] if they are still charging her for oxygen used and she will let us know ans provide us with their fax #.   She might also need an order to discontinue oxygen use, order pended.   Pls advise, thanks in advance.       No future appointments.

## 2024-06-14 RX ORDER — ALPRAZOLAM 0.25 MG/1
0.25 TABLET ORAL DAILY PRN
Qty: 30 TABLET | Refills: 0 | Status: SHIPPED | OUTPATIENT
Start: 2024-06-14

## 2024-06-14 RX ORDER — FOLIC ACID 1 MG/1
1 TABLET ORAL DAILY
Qty: 90 TABLET | Refills: 0 | Status: SHIPPED | OUTPATIENT
Start: 2024-06-14

## 2024-06-14 NOTE — TELEPHONE ENCOUNTER
Informed patient regarding DR Marmolejo's order .   [facility] and will get the fax number and instructed that they could  the oxygen tank .   Patient will call back to give us the fax number in order for us to fax the order .

## 2024-06-14 NOTE — TELEPHONE ENCOUNTER
Dr. Marmolejo, letter pended for review/approval under communications tab, needs to be faxed once signed. Thank you.    Spoke to patient (name and  of patient verified). She provided phone number for Andrew [phone]  Contacted [facility] to provide verbal order to discontinue home oxygen. She states letter needs to be faxed stating \"please discontinue patient's home oxygen.\"  Fax to: [phone]

## 2024-07-03 ENCOUNTER — PATIENT OUTREACH (OUTPATIENT)
Dept: CASE MANAGEMENT | Age: 89
End: 2024-07-03

## 2024-07-03 DIAGNOSIS — M06.9 RHEUMATOID ARTHRITIS INVOLVING MULTIPLE SITES, UNSPECIFIED WHETHER RHEUMATOID FACTOR PRESENT (HCC): ICD-10-CM

## 2024-07-03 DIAGNOSIS — I25.10 CORONARY ARTERIOSCLEROSIS IN NATIVE ARTERY: Primary | ICD-10-CM

## 2024-07-03 DIAGNOSIS — F41.1 GENERALIZED ANXIETY DISORDER: ICD-10-CM

## 2024-07-03 DIAGNOSIS — E11.69 HYPERLIPIDEMIA ASSOCIATED WITH TYPE 2 DIABETES MELLITUS (HCC): ICD-10-CM

## 2024-07-03 DIAGNOSIS — E78.5 HYPERLIPIDEMIA ASSOCIATED WITH TYPE 2 DIABETES MELLITUS (HCC): ICD-10-CM

## 2024-07-03 DIAGNOSIS — E11.9 CONTROLLED TYPE 2 DIABETES MELLITUS WITHOUT COMPLICATION, WITHOUT LONG-TERM CURRENT USE OF INSULIN (HCC): ICD-10-CM

## 2024-07-08 NOTE — PROGRESS NOTES
Spoke to Lucie for CCM.      Updates to patient care team/comments: None   Patient reported changes in medications: None     Med Adherence  Comment: Pt confirms she is taking medications as instructed by providers.     Health Maintenance:   Health Maintenance   Topic Date Due    Diabetes Care Dilated Eye Exam  08/20/2022 the patient reports she is seeing an opthalmology out of [place]. She was not able to recall the name of the facility or provider. She will check her paper work and let me know.     Diabetes Care: Microalb/Creat Ratio  10/10/2023 The patient has pending orders. Advised the patient of the orders.     Diabetes Care A1C  07/15/2024 The patient has pending orders.    Diabetes Care Foot Exam  04/28/2025     COVID-19 Vaccine (4 - 2023-24 season) 05/19/2025    Zoster Vaccines (1 of 2) 10/26/2028     Diabetes Care: GFR  09/25/2024    Influenza Vaccine (1) 10/01/2024    Annual Physical  04/25/2025    Annual Depression Screening  Completed    Fall Risk Screening (Annual)  Completed    Pneumococcal Vaccine: 65+ Years  Completed     Patient updates/concerns:    The patient reports she did not sleep well last night and is still in bed. Her daughter is in the hospital, she is not sure what her condition is and is very worries. She talked extensively about her daughters health.     Additionally, she mentioned attending a carnival about a week ago. Where sitting on uncomfortable chairs aggravated her sciatica on the right side of her leg. Since then, she has been doing physical therapy exercises on her own, but has seen minimal improvement.     Goals/Action Plan:    Active goal from previous outreach:   What: Look for different ways to increased my activity.  Where/When/How-She will speak with her son about putting benches in her front lawn and side driveway to allow her to walk out doors more and stop and rest when she needs a break.   Patient reported progress towards goals: (New short term goal set)     NEW  GOAL:               - What: Improve sciatica pain           - Where/When/How: by doing daily PT exercises on her own.   Patient Reported Barriers and Concerns: Sciatica pain onset 1 week.                   - Plan for overcoming barriers: She will speak with her son about following up in the office.     Care Managers Interventions:   CCM  provided emotional support to the patient through empathic and reflective listening.       I encouraged three balanced meals along with trying to walk in her home and do daily stretches as tolerated.     I reminded the patient of fasting lab blood test Dr. Marmolejo ordered in April. I also asked her to call me back with her opthalmology information so that I may call and have progress notes faxed to the office.     We discussed the importance of following up in the office if her pain persists or increases. I offered to schedule her an appointment. She declined at this time has to coordinate with son.     Care every where updated    Provided support. Encouraged patient to call as needed.    Reviewed Future Appointments: No future appointments.    Next Care Manager Follow Up Date: One month. Encouraged patient to call as needed.    Reason For Follow Up: review progress and or barriers towards patient's goals.     Time Spent This Encounter Total: 21 min medical record review, telephone communication, care plan updates where needed, education, goals, and action plan recreation/update. Provided acknowledgment and validation to patient's concerns.   Monthly Minute Total including today: 21  Physical assessment, complete health history, and need for CCM established by Narayan Marmolejo MD.

## 2024-07-18 DIAGNOSIS — F41.1 GENERALIZED ANXIETY DISORDER: ICD-10-CM

## 2024-07-18 RX ORDER — GABAPENTIN 300 MG/1
300 CAPSULE ORAL 3 TIMES DAILY
Qty: 270 CAPSULE | Refills: 3 | Status: SHIPPED | OUTPATIENT
Start: 2024-07-18

## 2024-07-18 NOTE — TELEPHONE ENCOUNTER
Refill passed per [facility] protocol.  Requested Prescriptions   Pending Prescriptions Disp Refills    GABAPENTIN 300 MG Oral Cap [Pharmacy Med Name: GABAPENTIN CAP 300MG (NEUR)] 270 capsule 3     Sig: TAKE 1 CAPSULE BY MOUTH 3 TIMES  DAILY       Neurology Medications Passed - 7/16/2024  4:44 AM        Passed - In person appointment or virtual visit in the past 6 mos or appointment in next 3 mos     Recent Outpatient Visits              2 months ago Medicare annual wellness visit, subsequent    [facility]    Office Visit    5 months ago Chronic diastolic heart failure (HCC)    [facility]    [facility]    6 months ago Acute on chronic diastolic heart failure ([facility])    [facility]    Office Visit    1 year ago Community acquired pneumonia, unspecified laterality    [facility] [address], Narayan Salgado MD    Office Visit    1 year ago Medicare annual wellness visit, subsequent    [facility]    [facility] Visit                           Recent Outpatient Visits              2 months ago Medicare annual wellness visit, subsequent    [facility]    Office Visit    5 months ago Chronic diastolic heart failure (HCC)    [facility] [address], Narayan Salgado MD    Office Visit    6 months ago Acute on chronic diastolic heart failure ([facility])    [facility]    Office Visit    1 year ago Community acquired pneumonia, unspecified laterality    [facility]    [facility]    1 year ago Medicare annual  wellness visit, subsequent    [facility], [address], [place] Narayan Marmolejo MD    Office Visit

## 2024-07-19 DIAGNOSIS — E11.69 HYPERLIPIDEMIA ASSOCIATED WITH TYPE 2 DIABETES MELLITUS (HCC): ICD-10-CM

## 2024-07-19 DIAGNOSIS — E78.5 HYPERLIPIDEMIA ASSOCIATED WITH TYPE 2 DIABETES MELLITUS (HCC): ICD-10-CM

## 2024-07-22 DIAGNOSIS — F41.1 GENERALIZED ANXIETY DISORDER: ICD-10-CM

## 2024-07-23 NOTE — TELEPHONE ENCOUNTER
Please review; protocol failed/ has no protocol      Requested Prescriptions   Pending Prescriptions Disp Refills    EZETIMIBE 10 MG Oral Tab [Pharmacy Med Name: Ezetimibe 10 MG Oral Tablet] 90 tablet 3     Sig: TAKE 1 TABLET BY MOUTH AT NIGHT       Cholesterol Medication Protocol Failed - 7/19/2024  1:17 PM        Failed - ALT < 80     Lab Results   Component Value Date    ALT 18 10/10/2022             Failed - ALT resulted within past year        Failed - Lipid panel within past 12 months     Lab Results   Component Value Date    CHOLEST 148 10/10/2022    TRIG 150 (H) 10/10/2022    HDL 46 10/10/2022    LDL 76 10/10/2022    VLDL 23 10/10/2022    NONHDLC 102 10/10/2022             Passed - In person appointment or virtual visit in the past 12 mos or appointment in next 3 mos     Recent Outpatient Visits              2 months ago Medicare annual wellness [facility]    5 months ago [facility] diastolic heart [facility]HCC[facility]    6 months ago Acute on chronic diastolic heart failure (H[facility]    1 year ago Community acquired pneumonia, unspecified laterality    [facility]    1 year ago Medicare annual wellness visit, subsequent    [facility]                         Recent Outpatient Visits              2 months ago Medicare annual wellness visit, subsequent    [facility]    [facility]    5 months ago Chronic diastolic heart failure (HCC)    [facility], Narayan Salgado  MD    Office Visit    6 months ago Acute [facility] chronic diastolic heart [facility]    Office Visit    1 year ago Community acquired pneumonia, unspecified laterality    [facility]    [facility] Visit    1 year ago Medicare annual wellness visit, subsequent    [facility]    [facility] Visit

## 2024-07-24 RX ORDER — EZETIMIBE 10 MG/1
10 TABLET ORAL NIGHTLY
Qty: 90 TABLET | Refills: 0 | Status: SHIPPED | OUTPATIENT
Start: 2024-07-24

## 2024-07-24 RX ORDER — ALPRAZOLAM 0.25 MG/1
0.25 TABLET ORAL DAILY PRN
Qty: 30 TABLET | Refills: 0 | Status: SHIPPED | OUTPATIENT
Start: 2024-07-24

## 2024-07-24 RX ORDER — ALPRAZOLAM 0.25 MG/1
0.25 TABLET ORAL DAILY PRN
Qty: 30 TABLET | Refills: 0 | OUTPATIENT
Start: 2024-07-24

## 2024-08-07 ENCOUNTER — TELEPHONE (OUTPATIENT)
Dept: INTERNAL MEDICINE CLINIC | Facility: CLINIC | Age: 89
End: 2024-08-07

## 2024-08-07 NOTE — TELEPHONE ENCOUNTER
Prescription/certificate of medical necessity from [employer] placed on Dr. EUGENE tiwari for signature and review.

## 2024-08-11 DIAGNOSIS — K21.9 GASTROESOPHAGEAL REFLUX DISEASE, UNSPECIFIED WHETHER ESOPHAGITIS PRESENT: ICD-10-CM

## 2024-08-13 ENCOUNTER — PATIENT OUTREACH (OUTPATIENT)
Dept: CASE MANAGEMENT | Age: 89
End: 2024-08-13

## 2024-08-13 ENCOUNTER — MED REC SCAN ONLY (OUTPATIENT)
Dept: INTERNAL MEDICINE CLINIC | Facility: CLINIC | Age: 89
End: 2024-08-13

## 2024-08-13 ENCOUNTER — NURSE TRIAGE (OUTPATIENT)
Dept: INTERNAL MEDICINE CLINIC | Facility: CLINIC | Age: 89
End: 2024-08-13

## 2024-08-13 DIAGNOSIS — Z01.00 DIABETIC EYE EXAM (HCC): ICD-10-CM

## 2024-08-13 DIAGNOSIS — E11.69 HYPERLIPIDEMIA ASSOCIATED WITH TYPE 2 DIABETES MELLITUS (HCC): ICD-10-CM

## 2024-08-13 DIAGNOSIS — E78.5 HYPERLIPIDEMIA ASSOCIATED WITH TYPE 2 DIABETES MELLITUS (HCC): ICD-10-CM

## 2024-08-13 DIAGNOSIS — E11.9 CONTROLLED TYPE 2 DIABETES MELLITUS WITHOUT COMPLICATION, WITHOUT LONG-TERM CURRENT USE OF INSULIN (HCC): Primary | ICD-10-CM

## 2024-08-13 DIAGNOSIS — M06.9 RHEUMATOID ARTHRITIS INVOLVING MULTIPLE SITES, UNSPECIFIED WHETHER RHEUMATOID FACTOR PRESENT (HCC): ICD-10-CM

## 2024-08-13 DIAGNOSIS — I25.10 CORONARY ARTERIOSCLEROSIS IN NATIVE ARTERY: ICD-10-CM

## 2024-08-13 DIAGNOSIS — E11.9 DIABETIC EYE EXAM (HCC): ICD-10-CM

## 2024-08-13 DIAGNOSIS — I73.9 PERIPHERAL VASCULAR DISEASE (HCC): ICD-10-CM

## 2024-08-13 NOTE — PROGRESS NOTES
Spoke to Lucie for CCM.      Updates to patient care team/comments: None  Patient reported changes in medications: None     Med Adherence  Comment: Pt confirms she is taking medications as instructed by providers.     Health Maintenance: She is working on completing pending labs. She mentioned not having time and does not like to bother her son and daughter for transportation. She does not like using public transportation.   Health Maintenance   Topic Date Due    Diabetes Care Dilated Eye Exam  08/20/2022 She has an appointment scheduled with [facility] on the 26th of the month.    Diabetes Care: Microalb/Creat Ratio  10/10/2023    Diabetes Care A1C  07/15/2024    Diabetes Care Foot Exam  04/28/2025 (Originally 8/24/2022)    COVID-19 Vaccine (4 - 2023-24 season) 05/19/2025 (Originally 9/1/2023)    Zoster Vaccines (1 of 2) 10/26/2028 (Originally 7/16/1985)    Diabetes Care: GFR  09/25/2024    Influenza Vaccine (1) 10/01/2024    Annual Physical  04/25/2025    Annual Depression Screening  Completed    Fall Risk Screening (Annual)  Completed    Pneumococcal Vaccine: 65+ Years  Completed     Patient updates/concerns:    The patient reports her sciatica pain has almost resolved. She is doing PT exercises on her own at home and is being careful not to bending down or making fast movements.     Additionally, she mentioned a long standing growth in one of her eyes that has worsened recently. [facility] of this [facility]. She also reports that her hands are painful, with puffy and swollen finger due to her RA. She has an upcoming appointment with Dr. Vazquez, her rheumatologist, to discuss further.    Goals/Action Plan:    Active goal from previous outreach:   What: Improve sciatica pain           - Where/When/How: by doing daily PT exercises on her own.  Patient reported progress towards goals:                - What: The patient reports the pain has almost  resolved.           - Where/When/How: She reports doing PT exercises on her own at home as well as applying warm compresses which seem to help.   Patient Reported Barriers and Concerns: Growth in her eye                    - Plan for overcoming barriers: She will see opthalmalogy.    NEW GOAL: What: have eye assessed because of growth.   Where/When/How: by keeping her appointment with Dr. Shi.    Care Managers Interventions:   I reminded the patient of her pending lab orders. She verbalized understanding, indicating she did not want to bother her adult children to bring her to the lab. I encouraged her to arrange a time for her to come in.     I praised the patient for stay active and continuing with bingo nights.     We discussed the importance of eating health balanced meals and keeping up with her water intake.     Care every where updated.    Provided support. Encouraged patient to call as needed.    Reviewed Future Appointments: No future appointments.    Next Care Manager Follow Up Date: One month. Encouraged patient to call as needed.    Reason For Follow Up: review progress and or barriers towards patient's goals.     Time Spent This Encounter Total: 27  min medical record review, telephone communication, care plan updates where needed, education, goals, and action plan recreation/update. Provided acknowledgment and validation to patient's concerns.   Monthly Minute Total including today: 27 min  Physical assessment, complete health history, and need for CCM established by Narayan Marmolejo MD.

## 2024-08-13 NOTE — TELEPHONE ENCOUNTER
Action Requested: Summary for Provider     []  Critical Lab, Recommendations Needed  [] Need Additional Advice  []   FYI    []   Need Orders  [] Need Medications Sent to Pharmacy  []  Other     SUMMARY: Patient reports noticed a growth on her eyelid last week. Denies pain, vision changes.  Patient also reports watery eyes. Patient states not a sty, as growth is not in the eyelid. Patient requesting for Ophthalmology provider information near [place]. Patient provided with [facility] [place] information and also Dr. Caban's information.  Patient declined Dr. Marmolejo appointment, prefers to see a specialist.      Reason for call: Eye Problem (Growth in eye-last week)  Onset: Data Unavailable                   Reason for Disposition   Patient wants to be seen    Protocols used: Eyelid Swelling-A-OH

## 2024-08-14 RX ORDER — OMEPRAZOLE 20 MG/1
20 CAPSULE, DELAYED RELEASE ORAL
Qty: 90 CAPSULE | Refills: 3 | Status: SHIPPED | OUTPATIENT
Start: 2024-08-14

## 2024-08-14 NOTE — TELEPHONE ENCOUNTER
Refill passed per [facility] protocol.  Requested Prescriptions   Pending Prescriptions Disp Refills    OMEPRAZOLE 20 MG Oral Capsule Delayed Release [Pharmacy Med Name: Omeprazole 20 MG Oral Capsule Delayed Release] 90 capsule 3     Sig: TAKE 1 CAPSULE BY MOUTH BEFORE  BREAKFAST       Gastrointestional Medication Protocol Passed - 8/11/2024  3:37 AM        Passed - In person appointment or virtual visit in the past 12 mos or appointment in next 3 mos     Recent Outpatient Visits              3 months ago Medicare annual wellness [facility], subsequent    [facility]    [facility] Visit    6 months ago Chronic diastolic heart failure (HCC)    [facility] Narayan Salgado MD    Office Visit    7 months ago Acute on chronic diastolic heart failure ([facility])    [facility]    Office Visit    1 year ago Community acquired pneumonia, unspecified laterality    [facility] Narayan Salgado MD    Office Visit    1 year ago Medicare annual wellness visit, subsequent    [facility], Narayan Salgado MD    Office Visit                           Recent Outpatient Visits              3 months ago Medicare annual wellness visit, subsequent    [facility]    Office Visit    6 months ago Chronic diastolic heart failure (HCC)    [facility], Narayan Salgado MD    Office Visit    7 months ago Acute on chronic diastolic heart failure ([facility])    [facility]    Office Visit    1 year ago Community acquired pneumonia, unspecified laterality    [facility] Narayan Salgado  MD    Office Visit    1 year ago Medicare annual wellness visit, subsequent    [facility] [address], [place]    Office Visit

## 2024-08-17 ENCOUNTER — TELEPHONE (OUTPATIENT)
Dept: INTERNAL MEDICINE CLINIC | Facility: CLINIC | Age: 89
End: 2024-08-17

## 2024-08-17 ENCOUNTER — LAB ENCOUNTER (OUTPATIENT)
Dept: LAB | Age: 89
End: 2024-08-17
Attending: INTERNAL MEDICINE
Payer: MEDICARE

## 2024-08-17 DIAGNOSIS — E78.5 HYPERLIPIDEMIA ASSOCIATED WITH TYPE 2 DIABETES MELLITUS (HCC): ICD-10-CM

## 2024-08-17 DIAGNOSIS — E11.9 CONTROLLED TYPE 2 DIABETES MELLITUS WITHOUT COMPLICATION, WITHOUT LONG-TERM CURRENT USE OF INSULIN (HCC): ICD-10-CM

## 2024-08-17 DIAGNOSIS — R35.0 URINARY FREQUENCY: ICD-10-CM

## 2024-08-17 DIAGNOSIS — E11.69 HYPERLIPIDEMIA ASSOCIATED WITH TYPE 2 DIABETES MELLITUS (HCC): ICD-10-CM

## 2024-08-17 LAB
ALBUMIN SERPL-MCNC: 4 G/DL (ref 3.2–4.8)
ALBUMIN/GLOB SERPL: 1.4 {RATIO} (ref 1–2)
ALP LIVER SERPL-CCNC: 100 U/L
ALT SERPL-CCNC: 31 U/L
ANION GAP SERPL CALC-SCNC: 7 MMOL/L (ref 0–18)
AST SERPL-CCNC: 46 U/L (ref ?–34)
BILIRUB SERPL-MCNC: 1 MG/DL (ref 0.2–1.1)
BILIRUB UR QL: NEGATIVE
BUN BLD-MCNC: 16 MG/DL (ref 9–23)
BUN/CREAT SERPL: 17.8 (ref 10–20)
CALCIUM BLD-MCNC: 9.4 MG/DL (ref 8.7–10.4)
CHLORIDE SERPL-SCNC: 110 MMOL/L (ref 98–112)
CHOLEST SERPL-MCNC: 152 MG/DL (ref ?–200)
CLARITY UR: CLEAR
CO2 SERPL-SCNC: 28 MMOL/L (ref 21–32)
COLOR UR: YELLOW
CREAT BLD-MCNC: 0.9 MG/DL
CREAT UR-SCNC: 108.9 MG/DL
EGFRCR SERPLBLD CKD-EPI 2021: 62 ML/MIN/1.73M2 (ref 60–?)
EST. AVERAGE GLUCOSE BLD GHB EST-MCNC: 123 MG/DL (ref 68–126)
FASTING PATIENT LIPID ANSWER: YES
FASTING STATUS PATIENT QL REPORTED: YES
GLOBULIN PLAS-MCNC: 2.8 G/DL (ref 2–3.5)
GLUCOSE BLD-MCNC: 96 MG/DL (ref 70–99)
GLUCOSE UR-MCNC: NORMAL MG/DL
HBA1C MFR BLD: 5.9 % (ref ?–5.7)
HDLC SERPL-MCNC: 41 MG/DL (ref 40–59)
HGB UR QL STRIP.AUTO: NEGATIVE
KETONES UR-MCNC: NEGATIVE MG/DL
LDLC SERPL CALC-MCNC: 88 MG/DL (ref ?–100)
LEUKOCYTE ESTERASE UR QL STRIP.AUTO: NEGATIVE
MICROALBUMIN UR-MCNC: <0.3 MG/DL
NITRITE UR QL STRIP.AUTO: NEGATIVE
NONHDLC SERPL-MCNC: 111 MG/DL (ref ?–130)
OSMOLALITY SERPL CALC.SUM OF ELEC: 301 MOSM/KG (ref 275–295)
PH UR: 5.5 [PH] (ref 5–8)
POTASSIUM SERPL-SCNC: 4.8 MMOL/L (ref 3.5–5.1)
PROT SERPL-MCNC: 6.8 G/DL (ref 5.7–8.2)
PROT UR-MCNC: NEGATIVE MG/DL
SODIUM SERPL-SCNC: 145 MMOL/L (ref 136–145)
SP GR UR STRIP: 1.02 (ref 1–1.03)
TRIGL SERPL-MCNC: 131 MG/DL (ref 30–149)
UROBILINOGEN UR STRIP-ACNC: NORMAL
VLDLC SERPL CALC-MCNC: 21 MG/DL (ref 0–30)

## 2024-08-17 PROCEDURE — 83036 HEMOGLOBIN GLYCOSYLATED A1C: CPT

## 2024-08-17 PROCEDURE — 82570 ASSAY OF URINE CREATININE: CPT

## 2024-08-17 PROCEDURE — 81003 URINALYSIS AUTO W/O SCOPE: CPT

## 2024-08-17 PROCEDURE — 87086 URINE CULTURE/COLONY COUNT: CPT

## 2024-08-17 PROCEDURE — 80061 LIPID PANEL: CPT

## 2024-08-17 PROCEDURE — 80053 COMPREHEN METABOLIC PANEL: CPT

## 2024-08-17 PROCEDURE — 36415 COLL VENOUS BLD VENIPUNCTURE: CPT

## 2024-08-17 PROCEDURE — 82043 UR ALBUMIN QUANTITATIVE: CPT

## 2024-08-17 RX ORDER — AMLODIPINE BESYLATE 5 MG/1
5 TABLET ORAL 2 TIMES DAILY
Qty: 180 TABLET | Refills: 0 | Status: SHIPPED | OUTPATIENT
Start: 2024-08-17

## 2024-08-17 NOTE — TELEPHONE ENCOUNTER
Patient has a refill coming from her mail order pharmacy for this medication, but it will not get here until 8/23/24. She is out of the medication and needs a refill on this medication to last until then.    Medication Quantity Refills Start End   amLODIPine Besylate 5 MG Oral Tab -- -- 9/9/2020 --   Sig:   Take by mouth.     Route:   Oral     Class:   [id number] [place] [address] AT [facility] [address], [phone], [phone]        She is out of the medication; also transferred call to the answering service.

## 2024-08-17 NOTE — TELEPHONE ENCOUNTER
Name:SATURDAY TRIAGE RN                      8/17/2024 9:59:27 AM  ProfileId:    [id number]                   [facility]  Department:[place] ANSWERING SERVICE  ======================================================================  Text Messages    Message # [id number]         08/17/2024 09:57a   [[email or web address]]  To:  SATURDAY TRIAGE RN  From:  СВЕТЛАНА Marquez MD:  ELIZA  Phone#:  [phone]  ----------------------------------------------------------------------  PT WANTS RX REFILL ORDER PT STATED SHE TOTALLY RAN OUT OF IT    Dr. Marmolejo, she is asking for refill on Amlodipine 5 mg daily. She advised she's been taking these for a long time. I  see this on her medication list reported historical on 9/9/2020 and don't see that it was prescribed recently. Last office visit was 4/25/24 for physical. She never completed the blood tests and has no recent BMP/CMP. Advised her to get the tests done fasting asap due to our protocol when filling medications.    Looks like she sees cardiology per 4/30/24 encounter with Joe Lowry and they list her amlodipine as 5 mg twice daily  See their note from 8/13/24  amLODIPine Besylate 5 MG Oral Tablet TAKE 1 TABLET BY MOUTH TWICE DAILY 5 MG       SEE NOTE 4/25/24  Per her last visit:    (E11.59,  I15.2) Hypertension associated with type 2 diabetes mellitus (HCC)  Plan: bp controlled. Cpm/    She is requesting a short supply as she is awaiting the mail order. She want's it sent to [facility] in [place]    Please advise, thanks

## 2024-08-18 ENCOUNTER — TELEPHONE (OUTPATIENT)
Dept: INTERNAL MEDICINE CLINIC | Facility: CLINIC | Age: 89
End: 2024-08-18

## 2024-08-18 DIAGNOSIS — R79.89 ELEVATED LFTS: Primary | ICD-10-CM

## 2024-08-19 NOTE — TELEPHONE ENCOUNTER
I called and notified patient that RX sent to pharmacy.   Patient had not picked it up yet, will have son help her with this today.  No other questions or concerns at this time.

## 2024-09-01 DIAGNOSIS — F41.1 GENERALIZED ANXIETY DISORDER: ICD-10-CM

## 2024-09-04 RX ORDER — ALPRAZOLAM 0.25 MG
0.25 TABLET ORAL DAILY PRN
Qty: 30 TABLET | Refills: 0 | Status: SHIPPED | OUTPATIENT
Start: 2024-09-04

## 2024-09-04 NOTE — TELEPHONE ENCOUNTER
Please review. Protocol Failed; No Protocol    Recent fills: 5/1/2024, 6/14/2024, 7/24/2024  Last Rx written: 7/24/2024  Last office visit: 4/25/2024        Requested Prescriptions   Pending Prescriptions Disp Refills    ALPRAZOLAM 0.25 MG Oral Tab [Pharmacy Med Name: ALPRAZOLAM 0.25MG TABLETS] 30 tablet 0     Sig: TAKE 1 TABLET(0.25 MG) BY MOUTH DAILY AS NEEDED FOR ANXIETY       Controlled Substance Medication Failed - 9/1/2024  3:16 PM        Failed - This medication is a controlled substance - forward to provider to refill                 Recent Outpatient Visits              4 months ago Medicare annual wellness visit, subsequent    [facility]    Office Visit    6 months ago Chronic diastolic heart failure (HCC)    [facility] [address], Narayan Salgado MD    Office Visit    7 months ago Acute on chronic diastolic heart failure (HCC)    [facility]    Office Visit    1 year ago Community acquired pneumonia, unspecified laterality    [facility]    Office Visit    1 year ago Medicare annual wellness visit, subsequent    [facility]    Office Visit

## 2024-09-04 NOTE — TELEPHONE ENCOUNTER
Pt is requesting refill for the following medication        amLODIPine 5 MG Oral Tab, Take 1 tablet (5 mg total) by mouth in the morning and 1 tablet (5 mg total) before bedtime., Disp: 180 tablet, Rfl: 0

## 2024-09-06 RX ORDER — AMLODIPINE BESYLATE 5 MG/1
5 TABLET ORAL 2 TIMES DAILY
Qty: 180 TABLET | Refills: 3 | Status: SHIPPED | OUTPATIENT
Start: 2024-09-06

## 2024-09-06 NOTE — TELEPHONE ENCOUNTER
Refill Per Protocol     Requested Prescriptions   Pending Prescriptions Disp Refills    amLODIPine 5 MG Oral Tab 180 tablet 0     Sig: Take 1 tablet (5 mg total) by mouth in the morning and 1 tablet (5 mg total) before bedtime.       Hypertension Medications Protocol Passed - 9/4/2024  2:51 PM        Passed - CMP or BMP in past 12 months        Passed - Last BP reading less than 140/90     BP Readings from Last 1 Encounters:   05/18/24 131/49               Passed - In person appointment or virtual visit in the past 12 mos or appointment in next 3 mos     Recent Outpatient Visits              4 months ago Medicare annual wellness visit, subsequent    [facility] [facility]    Office Visit    7 months ago Chronic diastolic heart failure ([facility])    [facility] [address] [facility]    Office Visit    7 months ago Acute on chronic diastolic heart failure ([facility])    [facility] [address], Narayan Salgado MD    Office Visit    1 year ago Community acquired pneumonia, unspecified laterality    [facility] [address] [facility]    Office Visit    1 year ago Medicare annual wellness visit, subsequent    [facility] [address] [facility]    Office Visit                      Passed - EGFRCR or GFRNAA > 50     GFR Evaluation  EGFRCR: 62 , resulted on 8/17/2024                   Recent Outpatient Visits              4 months ago Medicare annual wellness visit, subsequent    [facility] [place] Emmanuel, [place]    Office Visit    7 months ago Chronic diastolic heart failure (HCC)    [facility]    Office Visit    7 months ago Acute on chronic diastolic heart failure ([facility])    [facility]  [place], Narayan Salgado MD    Office Visit    1 year ago Community acquired pneumonia, unspecified laterality    [facility] [address], Narayan Salgado MD    Office Visit    1 year ago Medicare annual wellness visit, subsequent    [facility], [place] Emmanuel, MD    Office Visit

## 2024-09-09 ENCOUNTER — TELEPHONE (OUTPATIENT)
Dept: INTERNAL MEDICINE CLINIC | Facility: CLINIC | Age: 89
End: 2024-09-09

## 2024-09-13 ENCOUNTER — PATIENT OUTREACH (OUTPATIENT)
Dept: CASE MANAGEMENT | Age: 89
End: 2024-09-13

## 2024-09-13 DIAGNOSIS — E11.69 HYPERLIPIDEMIA ASSOCIATED WITH TYPE 2 DIABETES MELLITUS (HCC): ICD-10-CM

## 2024-09-13 DIAGNOSIS — I50.32 CHRONIC DIASTOLIC HEART FAILURE (HCC): ICD-10-CM

## 2024-09-13 DIAGNOSIS — E11.59 HYPERTENSION ASSOCIATED WITH TYPE 2 DIABETES MELLITUS (HCC): ICD-10-CM

## 2024-09-13 DIAGNOSIS — I15.2 HYPERTENSION ASSOCIATED WITH TYPE 2 DIABETES MELLITUS (HCC): ICD-10-CM

## 2024-09-13 DIAGNOSIS — E78.5 HYPERLIPIDEMIA ASSOCIATED WITH TYPE 2 DIABETES MELLITUS (HCC): ICD-10-CM

## 2024-09-13 DIAGNOSIS — I25.10 CORONARY ARTERIOSCLEROSIS IN NATIVE ARTERY: ICD-10-CM

## 2024-09-13 DIAGNOSIS — E11.9 CONTROLLED TYPE 2 DIABETES MELLITUS WITHOUT COMPLICATION, WITHOUT LONG-TERM CURRENT USE OF INSULIN (HCC): Primary | ICD-10-CM

## 2024-09-13 DIAGNOSIS — I73.9 PERIPHERAL VASCULAR DISEASE (HCC): ICD-10-CM

## 2024-09-13 NOTE — PROGRESS NOTES
Spoke to Lucie for CCM.      Updates to patient care team/comments: None   Patient reported changes in medications: None     Med Adherence  Comment: Pt confirmed she is taking medications as instructed by their providers.     Health Maintenance:   Health Maintenance   Topic Date Due    Diabetes Care Dilated Eye Exam  08/20/2022    COVID-19 Vaccine (4 - 2023-24 season) 09/01/2024    Diabetes Care Foot Exam  04/28/2025 (Originally 8/24/2022)    Zoster Vaccines (1 of 2) 10/26/2028 (Originally 7/16/1985)    Influenza Vaccine (1) 10/01/2024    Diabetes Care A1C  02/17/2025    Annual Physical  04/25/2025    Diabetes Care: GFR  08/17/2025    Diabetes Care: Microalb/Creat Ratio  08/17/2025    Annual Depression Screening  Completed    Fall Risk Screening (Annual)  Completed    Pneumococcal Vaccine: 65+ Years  Completed     Patient updates/concerns:     The patient reports that she went to the emergency room last Saturday due to SOB and was admitted for several day under observation. She is now back on oxygen therapy. Currently uses 3 liters for the oxygen, currently using 3 liters of oxygen when ambulating. She expressed dislike for the oxygen, stating she would prefer a portable oxygen unit, as her current one is difficult to manage because of the size of it and her small frame. Additionally, she enjoys playing [employer]asa[employer] one or twice a month and plans to check with [facility] to see if they have a portable unit available. Overall, the patient reports feeling much better and is not experiencing ant other symtoms. Her appetite is slightly decreased, but she is making an effort to eat small amounts.    Goals/Action Plan:    Active goal from previous outreach:   : What: have eye assessed because of growth.   Where/When/How: by keeping her appointment with Dr. Shi.  Patient reported progress towards goals:                - What: She followed up with Dr. Shi           - Where/When/How: the patient reports it was  nothing to worry about she has some dry eye and uses OTC drops.  NEW GOAL  What: Check to see if she qualifies for a portable oxygen tank  Where,when, how: by following up with her current DME company to see if they have portable oxygen tanks available and assure her insurance will cover it.     Care Managers Interventions:   I offered to contact the DME company. The patient declined, indicating she will have to look for the DME company information as the oxygen was ordered at [facility]. She will call back with information.     Nutrition and exercise counseling: I encouraged the patient to consume three balanced meals daily and discussed the importance of incorporating 20-30 minutes of daily exercise, including gentle stretches. To improve overall health.    Chat review and documentation: I thoroughly reviewed the patient chart and updated Care Everywhere.    Immunization Status: I conducted an [employer] immunization query, which confirmed that no updates were necessary at this time.     Social determinants of health: I updated social determinants of health to reflect any recent changes.    Patient support: I actively listened to the patient's concern's, provided emotional support and encouraged the patient to reach out, if she needs further assistance.     Reviewed Future Appointments:   Future Appointments   Date Time Provider Department Center   9/26/2024  2:30 PM Narayan Marmolejo MD [facility]     Next Care Manager Follow Up Date: One month. Encouraged patient to call as needed.    Reason For Follow Up: review progress and or barriers towards patient's goals.     Time Spent This Encounter Total: 28 min medical record review, telephone communication, care plan updates where needed, education, goals, and action plan recreation/update. Provided acknowledgment and validation to patient's concerns.   Monthly Minute Total including today: 28 min  Physical assessment, complete health history, and need for CCM  established by Narayan Marmolejo MD.

## 2024-09-16 ENCOUNTER — TELEPHONE (OUTPATIENT)
Dept: INTERNAL MEDICINE CLINIC | Facility: CLINIC | Age: 89
End: 2024-09-16

## 2024-09-16 NOTE — TELEPHONE ENCOUNTER
Patient called and is requesting to speak to someone in   office, she is asking if she will be able to request a portable oxygen tank. Patient said she needs to get out and is needed. Please advise

## 2024-09-16 NOTE — TELEPHONE ENCOUNTER
Left message to call back-transfer to triage.    DME order dated 1/18/2024 for a compact portable oxygen tank- 3liters through [facility].

## 2024-09-17 NOTE — TELEPHONE ENCOUNTER
Patient calling,verified name and date of birth.    Patient calling to ask for additional information to be sent to [facility] so she can get portable oxygen. She not sure what is  needed.   Called [facility] home oxygen was started last Tuesday.  [facility] will check with [facility] hospital liaison to access needed documentation of of 6 minute walk test from  medical records.  [facility] will call our office back if additional documentation is needed.  Patient informed by phone.

## 2024-09-17 NOTE — TELEPHONE ENCOUNTER
I called and spoke with the patient and advised she has order that was sent to [facility] in Jan. If never filled she should be able to call and have this done.     I gave her number to [facility] and she will call them.  If new order or something more needed, she will call us back so we can further assist.

## 2024-09-19 DIAGNOSIS — E11.69 HYPERLIPIDEMIA ASSOCIATED WITH TYPE 2 DIABETES MELLITUS (HCC): ICD-10-CM

## 2024-09-19 DIAGNOSIS — E78.5 HYPERLIPIDEMIA ASSOCIATED WITH TYPE 2 DIABETES MELLITUS (HCC): ICD-10-CM

## 2024-09-22 RX ORDER — EZETIMIBE 10 MG/1
10 TABLET ORAL NIGHTLY
Qty: 90 TABLET | Refills: 3 | Status: SHIPPED | OUTPATIENT
Start: 2024-09-22

## 2024-09-22 NOTE — TELEPHONE ENCOUNTER
Refill passed per [facility] protocol.    Requested Prescriptions   Pending Prescriptions Disp Refills    EZETIMIBE 10 MG Oral Tab [Pharmacy Med Name: Ezetimibe 10 MG Oral Tablet] 90 tablet 3     Sig: TAKE 1 TABLET BY MOUTH EVERY  NIGHT       Cholesterol Medication Protocol Passed - 9/19/2024  3:38 AM        Passed - ALT < 80     Lab Results   Component Value Date    ALT 31 08/17/2024             Passed - ALT resulted within past year        Passed - Lipid panel within past 12 months     Lab Results   Component Value Date    CHOLEST 152 08/17/2024    TRIG 131 08/17/2024    HDL 41 08/17/2024    LDL 88 08/17/2024    VLDL 21 08/17/2024    NONHDLC 111 08/17/2024             Passed - In person appointment or virtual visit in the past 12 mos or appointment in next 3 mos     Recent Outpatient Visits              5 months ago Medicare annual wellness visit, subsequent    [facility] Narayan Marmolejo MD    Office Visit    7 months ago Chronic diastolic heart failure (HCC)    [facility], Narayan Salgado MD    Office Visit    8 months ago Acute on chronic diastolic heart failure (HCC)    [facility], Narayan Salgado MD    Office Visit    1 year ago Community acquired pneumonia, unspecified laterality    [facility] [place] Narayan Marmolejo MD    Office Visit    1 year ago Medicare annual wellness visit, subsequent    [facility] Narayan Marmolejo MD    Office Visit          Future Appointments         Provider Department Appt Notes    In 4 days Narayan Marmolejo MD [facility] HFU - appt made by  mariam yost                         Future Appointments         Provider Department Appt Notes    In 4 days Narayan Marmolejo MD [facility]  [facility] HFU - appt made by  mariam yost            Recent Outpatient Visits              5 months ago Medicare annual wellness visit, subsequent    [facility]    Office Visit    7 months ago Chronic diastolic heart failure (HCC)    [facility]    Office Visit    8 months ago Acute on chronic diastolic heart failure (HCC)    [facility]    Office Visit    1 year ago Community acquired pneumonia, unspecified laterality    [facility] [address], Narayan Salgado MD    Office Visit    1 year ago Medicare annual wellness visit, subsequent    [facility] [address], Narayan Salgado MD    Office Visit

## 2024-09-28 ENCOUNTER — LAB ENCOUNTER (OUTPATIENT)
Dept: LAB | Age: 89
End: 2024-09-28
Attending: INTERNAL MEDICINE
Payer: MEDICARE

## 2024-09-28 ENCOUNTER — OFFICE VISIT (OUTPATIENT)
Dept: INTERNAL MEDICINE CLINIC | Facility: CLINIC | Age: 89
End: 2024-09-28

## 2024-09-28 VITALS
HEIGHT: 62 IN | HEART RATE: 83 BPM | WEIGHT: 171.81 LBS | TEMPERATURE: 97 F | SYSTOLIC BLOOD PRESSURE: 130 MMHG | BODY MASS INDEX: 31.62 KG/M2 | DIASTOLIC BLOOD PRESSURE: 80 MMHG | OXYGEN SATURATION: 91 %

## 2024-09-28 DIAGNOSIS — M06.9 RHEUMATOID ARTHRITIS INVOLVING MULTIPLE SITES, UNSPECIFIED WHETHER RHEUMATOID FACTOR PRESENT (HCC): ICD-10-CM

## 2024-09-28 DIAGNOSIS — M06.9 RHEUMATOID ARTHRITIS INVOLVING MULTIPLE SITES, UNSPECIFIED WHETHER RHEUMATOID FACTOR PRESENT (HCC): Primary | ICD-10-CM

## 2024-09-28 DIAGNOSIS — R79.89 ELEVATED LFTS: ICD-10-CM

## 2024-09-28 LAB
ALBUMIN SERPL-MCNC: 4 G/DL (ref 3.2–4.8)
ALP LIVER SERPL-CCNC: 93 U/L
ALT SERPL-CCNC: 9 U/L
AST SERPL-CCNC: 24 U/L (ref ?–34)
BILIRUB DIRECT SERPL-MCNC: 0.2 MG/DL (ref ?–0.3)
BILIRUB SERPL-MCNC: 0.8 MG/DL (ref 0.2–1.1)
CRP SERPL-MCNC: 5.7 MG/DL (ref ?–1)
ERYTHROCYTE [SEDIMENTATION RATE] IN BLOOD: 57 MM/HR
HCV AB SERPL QL IA: NONREACTIVE
PROT SERPL-MCNC: 7.1 G/DL (ref 5.7–8.2)

## 2024-09-28 PROCEDURE — 86803 HEPATITIS C AB TEST: CPT

## 2024-09-28 PROCEDURE — 99214 OFFICE O/P EST MOD 30 MIN: CPT | Performed by: INTERNAL MEDICINE

## 2024-09-28 PROCEDURE — 87340 HEPATITIS B SURFACE AG IA: CPT

## 2024-09-28 PROCEDURE — 85652 RBC SED RATE AUTOMATED: CPT

## 2024-09-28 PROCEDURE — 36415 COLL VENOUS BLD VENIPUNCTURE: CPT

## 2024-09-28 PROCEDURE — 86704 HEP B CORE ANTIBODY TOTAL: CPT

## 2024-09-28 PROCEDURE — 80076 HEPATIC FUNCTION PANEL: CPT

## 2024-09-28 PROCEDURE — 86140 C-REACTIVE PROTEIN: CPT

## 2024-09-28 PROCEDURE — 86706 HEP B SURFACE ANTIBODY: CPT

## 2024-09-28 RX ORDER — PREDNISONE 5 MG/1
5 TABLET ORAL DAILY
Qty: 30 TABLET | Refills: 0 | Status: SHIPPED | OUTPATIENT
Start: 2024-09-28

## 2024-09-29 LAB
HBV CORE AB SERPL QL IA: NONREACTIVE
HBV SURFACE AB SER QL: NONREACTIVE
HBV SURFACE AB SERPL IA-ACNC: <3.1 MIU/ML
HBV SURFACE AG SER-ACNC: <0.1 [IU]/L
HBV SURFACE AG SERPL QL IA: NONREACTIVE

## 2024-09-29 NOTE — PROGRESS NOTES
Subjective:     Patient ID: Lucie Schuster is a 89 year old female.    Joint Pain  This is a new problem. The current episode started in the past 7 days. The problem occurs constantly. The problem has been unchanged. Associated symptoms include joint swelling. Pertinent negatives include no chest pain, coughing, fever or rash. Exacerbated by: movement of joints. She has tried nothing (pt was seen in ER and was told nothing was wrong) for the symptoms. The treatment provided no relief.       History/Other:   Review of Systems   Constitutional: Negative.  Negative for fever.   Respiratory: Negative.  Negative for cough.    Cardiovascular: Negative.  Negative for chest pain.   Gastrointestinal: Negative.    Musculoskeletal:  Positive for joint pain and joint swelling.   Skin:  Negative for rash.     Current Outpatient Medications   Medication Sig Dispense Refill    predniSONE 5 MG Oral Tab Take 1 tablet (5 mg total) by mouth daily. 30 tablet 0    ezetimibe 10 MG Oral Tab Take 1 tablet (10 mg total) by mouth nightly. 90 tablet 3    amLODIPine 5 MG Oral Tab Take 1 tablet (5 mg total) by mouth in the morning and 1 tablet (5 mg total) before bedtime. 180 tablet 3    ALPRAZolam 0.25 MG Oral Tab Take 1 tablet (0.25 mg total) by mouth daily as needed for Anxiety. 30 tablet 0    omeprazole 20 MG Oral Capsule Delayed Release Take 1 capsule (20 mg total) by mouth before breakfast. 90 capsule 3    gabapentin 300 MG Oral Cap Take 1 capsule (300 mg total) by mouth 3 (three) times daily. 270 capsule 3    folic acid 1 MG Oral Tab Take 1 tablet (1 mg total) by mouth daily. 90 tablet 0    furosemide 20 MG Oral Tab Take 1 tablet (20 mg total) by mouth daily as needed.      Metoprolol Succinate ER 50 MG Oral Tablet 24 Hr Take by mouth.      LOSARTAN POTASSIUM 50 MG Oral Tab TAKE 1 TABLET BY MOUTH EVERY DAY 90 tablet 0    Multiple Vitamins-Minerals (EYE VITAMINS) Oral Cap Take 1 capsule by mouth daily.      aspirin 81 MG Oral Tab Take 1  tablet (81 mg total) by mouth daily.      Calcium Carb-Cholecalciferol (CALCIUM + D3) 600-200 MG-UNIT Oral Tab Take 1 tablet by mouth daily.      methotrexate (RHEUMATREX) 2.5 MG Oral Tab Take 1 tablet (2.5 mg total) by mouth. TAKE 6 TAB ONCE A WEEK. PATIENT TAKING EVERY Thursday.      Red Yeast Rice 600 MG Oral Cap Take 2 tablets by mouth daily.      methylPREDNISolone (MEDROL) 4 MG Oral Tablet Therapy Pack Dosepack: take as directed (Patient not taking: Reported on 4/25/2024) 1 each 0    HYDROcodone-acetaminophen 5-325 MG Oral Tab Take 1 tablet by mouth every 6 (six) hours as needed for Pain. (Patient not taking: Reported on 9/28/2024) 10 tablet 0    Potassium Chloride ER 10 MEQ Oral Tab CR  (Patient not taking: Reported on 4/25/2024)      ketoconazole 2 % External Cream Apply 1 Application topically 2 (two) times daily. (Patient not taking: Reported on 1/11/2024) 60 g 0    fluticasone propionate 50 MCG/ACT Nasal Suspension 2 sprays by Nasal route daily. (Patient not taking: Reported on 9/28/2024) 48 mL 3    losartan 100 MG Oral Tab  (Patient not taking: Reported on 4/25/2024)      amLODIPine Besylate 5 MG Oral Tab Take by mouth.       Allergies:  Allergies   Allergen Reactions    Sulfa Antibiotics SWELLING    Atorvastatin MYALGIA    Irbesartan MYALGIA    Rosuvastatin MYALGIA    Amoxicillin OTHER (SEE COMMENTS)     Yeast infection    Bactrim ITCHING    Clavulanic Acid OTHER (SEE COMMENTS)     Yeast infection    Codeine HIVES    Cefprozil ITCHING     Yeast infection    Lisinopril Coughing       Past Medical History:    Acute subendocardial infarction, subsequent episode of care (HCC)    Acute, but ill-defined, cerebrovascular disease    had stroke left frontal, parietal, occipital    Anxiety    Atherosclerosis of coronary artery    Carotid stenosis    Carpal tunnel syndrome, right    Diabetes (HCC)    as per NG    Esophageal reflux    as per NG    Lumbar spinal stenosis    Obesity    Osteoarthritis    Other and  unspecified hyperlipidemia    as per     Rheumatoid arthritis (HCC)    Status post carotid endarterectomy    Type II or unspecified type diabetes mellitus without mention of complication, not stated as uncontrolled    Unspecified essential hypertension    as per NG      Past Surgical History:   Procedure Laterality Date    Anesth,surgery of shoulder Right     Angioplasty (coronary)      Angioplasty with Stent; as per NG    Cholecystectomy      Colonoscopy      Electrocardiogram, complete  2013    SCANNED TO MEDIA TAB- 2013    Knee replacement surgery      Other surgical history        Family History   Problem Relation Age of Onset    Kidney Disease Father         as per     Lipids Mother         Hyperlipidemia; as per NG    Hypertension Mother         as per     Heart Disease Mother         CAD; as per NG      Social History:   Social History     Socioeconomic History    Marital status:    Tobacco Use    Smoking status: Former     Current packs/day: 0.00     Types: Cigarettes     Quit date: 1995     Years since quittin.7     Passive exposure: Past    Smokeless tobacco: Never   Vaping Use    Vaping status: Never Used   Substance and Sexual Activity    Alcohol use: No     Alcohol/week: 0.0 standard drinks of alcohol    Drug use: No   Other Topics Concern    Caffeine Concern Yes     Comment: Coffee, 1 cup; as per      Social Determinants of Health     Financial Resource Strain: Low Risk  (3/18/2024)    Financial Resource Strain     Difficulty of Paying Living Expenses: Not very hard     Med Affordability: No   Food Insecurity: No Food Insecurity (2023)    Food Insecurity     Food Insecurity: Never true   Transportation Needs: No Transportation Needs (2023)    Transportation Needs     Lack of Transportation: No   Physical Activity: Insufficiently Active (2023)    Exercise Vital Sign     Days of Exercise per Week: 3 days     Minutes of Exercise per Session: 20 min    Stress: No Stress Concern Present (2/16/2023)    Stress     Feeling of Stress : No   Social Connections: Socially Isolated (2/16/2023)    Social Connections     Frequency of Socialization with Friends and Family: 0   Housing Stability: Low Risk  (2/16/2023)    Housing Stability     Housing Instability: No        Objective:   Physical Exam  Constitutional:       General: She is not in acute distress.     Appearance: She is obese. She is not ill-appearing, toxic-appearing or diaphoretic.   HENT:      Right Ear: External ear normal.      Left Ear: External ear normal.   Cardiovascular:      Rate and Rhythm: Normal rate and regular rhythm.      Heart sounds: Normal heart sounds. No murmur heard.  Pulmonary:      Effort: Pulmonary effort is normal. No respiratory distress.      Breath sounds: Normal breath sounds. No wheezing or rales.   Abdominal:      General: Abdomen is flat. Bowel sounds are normal. There is no distension.      Palpations: Abdomen is soft.      Tenderness: There is no abdominal tenderness. There is no rebound.      Hernia: No hernia is present.   Musculoskeletal:      Right wrist: Swelling and tenderness present. Decreased range of motion.      Left wrist: Swelling and tenderness present. Decreased range of motion.      Right hand: Swelling present.      Left hand: Swelling present.      Cervical back: Neck supple. No rigidity or tenderness.      Right lower leg: No edema.      Left lower leg: No edema.   Lymphadenopathy:      Cervical: No cervical adenopathy.   Skin:     Coloration: Skin is not jaundiced or pale.      Findings: No rash.   Neurological:      Mental Status: She is alert.         Assessment & Plan:   (M06.9) Rheumatoid arthritis involving multiple sites, unspecified whether rheumatoid factor present (HCC)  (primary encounter diagnosis)  Plan: Sed Rate, Westergren (Automated), C-Reactive         Protein       Pt was evaluated in ER and was told nothing wrong with her. ER notes  reviewed. I do  Suspect RA flare up, check esr and crp. Pt started on prednisone 5mg q am til she is seen by her rheumatologist.         Orders Placed This Encounter   Procedures    Sed Rate, Westergren (Automated)    C-Reactive Protein       Meds This Visit:  Requested Prescriptions     Signed Prescriptions Disp Refills    predniSONE 5 MG Oral Tab 30 tablet 0     Sig: Take 1 tablet (5 mg total) by mouth daily.       Imaging & Referrals:  None

## 2024-10-01 ENCOUNTER — TELEPHONE (OUTPATIENT)
Dept: INTERNAL MEDICINE CLINIC | Facility: CLINIC | Age: 89
End: 2024-10-01

## 2024-10-01 ENCOUNTER — MED REC SCAN ONLY (OUTPATIENT)
Dept: INTERNAL MEDICINE CLINIC | Facility: CLINIC | Age: 89
End: 2024-10-01

## 2024-10-03 NOTE — TELEPHONE ENCOUNTER
Respiratory order form completed and faxed to [facility] at [phone], confirmation received.

## 2024-10-10 ENCOUNTER — PATIENT OUTREACH (OUTPATIENT)
Dept: CASE MANAGEMENT | Age: 89
End: 2024-10-10

## 2024-10-10 DIAGNOSIS — I15.2 HYPERTENSION ASSOCIATED WITH TYPE 2 DIABETES MELLITUS (HCC): ICD-10-CM

## 2024-10-10 DIAGNOSIS — E11.59 HYPERTENSION ASSOCIATED WITH TYPE 2 DIABETES MELLITUS (HCC): ICD-10-CM

## 2024-10-10 DIAGNOSIS — E66.01 MORBID (SEVERE) OBESITY DUE TO EXCESS CALORIES (HCC): ICD-10-CM

## 2024-10-10 DIAGNOSIS — I73.9 PERIPHERAL VASCULAR DISEASE (HCC): ICD-10-CM

## 2024-10-10 DIAGNOSIS — Z91.81 RISK FOR FALLS: ICD-10-CM

## 2024-10-10 DIAGNOSIS — E78.5 HYPERLIPIDEMIA ASSOCIATED WITH TYPE 2 DIABETES MELLITUS (HCC): ICD-10-CM

## 2024-10-10 DIAGNOSIS — E11.9 CONTROLLED TYPE 2 DIABETES MELLITUS WITHOUT COMPLICATION, WITHOUT LONG-TERM CURRENT USE OF INSULIN (HCC): ICD-10-CM

## 2024-10-10 DIAGNOSIS — I25.10 CORONARY ARTERIOSCLEROSIS IN NATIVE ARTERY: Primary | ICD-10-CM

## 2024-10-10 DIAGNOSIS — E11.69 HYPERLIPIDEMIA ASSOCIATED WITH TYPE 2 DIABETES MELLITUS (HCC): ICD-10-CM

## 2024-10-10 DIAGNOSIS — M06.9 RHEUMATOID ARTHRITIS INVOLVING MULTIPLE SITES, UNSPECIFIED WHETHER RHEUMATOID FACTOR PRESENT (HCC): ICD-10-CM

## 2024-10-10 RX ORDER — PREDNISONE 10 MG/1
TABLET ORAL
COMMUNITY
Start: 2024-10-10

## 2024-10-10 NOTE — PROGRESS NOTES
Spoke to Lucie for CCM.      Updates to patient care team/comments: None   Patient reported changes in medications:See below    Dr. George Valdes increase her Prednisone to 10 mg and started her on a Medrol dose pack. She is also taking OTC apple cider vinegar and Cinnamon pills.    NOT taking:  Fluticasone propionate 50 MCG/ACT Nasal Suspension she only took this short term.  Hydrocodone- Acetaminophen 5-325- Short term  Ketoconazole 2% states it was only short term.  Losartan 50 mg was increased to 100 mg     Med Adherence  Comment: Pt confirmed she is taking medications as instructed by their providers.    Health Maintenance: She plans to go to her local pharmacy for a COVID and Flu vaccine.   Health Maintenance   Topic Date Due    Diabetes Care Dilated Eye Exam  08/20/2022    COVID-19 Vaccine (4 - 2023-24 season) 09/01/2024    Influenza Vaccine (1) 10/01/2024    Diabetes Care Foot Exam  04/28/2025 (Originally 8/24/2022)    Zoster Vaccines (2 of 2) 10/12/2024    Diabetes Care A1C  02/17/2025    Annual Physical  04/25/2025    Diabetes Care: GFR  08/17/2025    Diabetes Care: Microalb/Creat Ratio  08/17/2025    Annual Depression Screening  Completed    Fall Risk Screening (Annual)  Completed    Pneumococcal Vaccine: 65+ Years  Completed     Patient updates/concerns:     The patient mentioned that she was evaluated at [facility] ED on on 9/26/24  due to increased widespread pain all over her body, which was attributed to a RA flare up. At the time, she was also experiencing significant weakness, sob and epigastric pain. She was treated and discharged the same day.    Since the ED visit, she has followed up with Dr. Vazquez, who started her on a Medrol dose pack. She reports that her left leg remains very painful and stiff, making it difficult to lift and interfering with her ability to dress herself, However, she notes that the steroids are providing some relief.    Additionally, she mentions that she has  been able to return to [facility] night.However, she is currently on Oxygen therapy. Due to insurance coverage issues and backorder portable oxygen tanks, she has not been taking her oxygen with her outside her home. She primarily uses the oxygen at home and does not go out often.      Goals/Action Plan:    Active goal from previous outreach:   What: Check to see if she qualifies for a portable oxygen tank  Where,when, how: by following up with her current DME company to see if they have portable oxygen tanks available.  Patient reported progress towards goals:                - What: She continues to work towards getting a portable oxygen tank.           - Where/When/How: She followed up in the office and obtained orders and forms for a portable oxygen tank. She is waiting on DME company and insurance for approval.  Patient Reported Barriers and Concerns: She is not using her oxygen consistently because the tanks are too big for her to take when she goes out.                   - Plan for overcoming barriers: She is making sure she caries a pulse oximeter with her and makes sure her O2 is staying above 90.    Care Managers Interventions:     Nutrition and exercise counseling: I encouraged the patient to consume three balanced meals daily and discussed the importance of incorporating 20-30 minutes of daily exercise, including gentle stretches. To improve overall health.    We discussed the importance of using her Oxygen at all times.    Chat review and documentation: I thoroughly reviewed the patient chart and updated Care Everywhere.    Immunization Status: I conducted an ID immunization query, which confirmed that no updates were necessary at this time.     Social determinants of health: I updated social determinants of health to reflect any recent changes.    Patient support: I actively listened to the patient's concern's, provided emotional support and encouraged the patient to reach out, if she needs further  assistance.     Reviewed Future Appointments: No future appointments.    Next Care Manager Follow Up Date: One month. Encouraged patient to call as needed.    Reason For Follow Up: review progress and or barriers towards patient's goals.     Time Spent This Encounter Total: 38 min medical record review, telephone communication, care plan updates where needed, education, goals, and action plan recreation/update. Provided acknowledgment and validation to patient's concerns.   Monthly Minute Total including today: 38 min  Physical assessment, complete health history, and need for CCM established by Narayan Marmolejo MD.

## 2024-10-14 NOTE — TELEPHONE ENCOUNTER
----- Message from Hui BRYAN sent at 10/11/2024  3:30 PM EDT -----  Regarding: care gap request  10/11/24 3:30 PM    Hello, our patient attached above has had HIV completed/performed. Please assist in updating the patient chart by pulling the Care Everywhere (CE) document. The date of service is 2017.     Thank you,  Hui Ambrose [facility] SANJAY   Please review. Protocol Failed; No Protocol      Recent fills: 3/28/2024, 5/1/2024, 6/14/2024  Last Rx written: 6/14/2024  Last office visit: 4/25/2024      Requested Prescriptions   Pending Prescriptions Disp Refills    ALPRAZOLAM 0.25 MG Oral Tab [Pharmacy Med Name: ALPRAZOLAM 0.25MG TABLETS] 30 tablet 0     Sig: TAKE 1 TABLET(0.25 MG) BY MOUTH DAILY AS NEEDED FOR ANXIETY       Controlled Substance Medication Failed - 7/18/2024  2:16 PM        Failed - This medication is a controlled substance - forward to provider to refill                 Recent Outpatient Visits              2 months ago Medicare annual wellness [facility]    Office Visit    5 months ago Chronic diastolic heart failure (HCC)    [facility] [address], Narayan Salgado MD    Office Visit    6 months ago Acute on chronic diastolic heart failure (HCC)    [facility]    Office Visit    1 year ago Community acquired pneumonia, unspecified laterality    [facility]    Office Visit    1 year ago Medicare annual wellness visit, subsequent    [facility]    Office Visit

## 2024-10-15 DIAGNOSIS — F41.1 GENERALIZED ANXIETY DISORDER: ICD-10-CM

## 2024-10-17 DIAGNOSIS — F41.1 GENERALIZED ANXIETY DISORDER: ICD-10-CM

## 2024-10-17 NOTE — TELEPHONE ENCOUNTER
Patient wanting to know the status on her Alprazolam refill.  Did let her know it is pending for provider to approve.  She verbalized understanding.

## 2024-10-18 RX ORDER — ALPRAZOLAM 0.25 MG/1
0.25 TABLET ORAL DAILY PRN
Qty: 30 TABLET | Refills: 0 | OUTPATIENT
Start: 2024-10-18

## 2024-10-18 RX ORDER — ALPRAZOLAM 0.25 MG/1
0.25 TABLET ORAL DAILY PRN
Qty: 30 TABLET | Refills: 0 | Status: SHIPPED | OUTPATIENT
Start: 2024-10-18

## 2024-10-18 NOTE — TELEPHONE ENCOUNTER
Please review. Protocol Failed; No Protocol      Recent fills: 6/14/2024, 7/24/2024, 9/4/2024  Last Rx written: 9/4/2024  Last office visit: 9/28/2024          Requested Prescriptions   Pending Prescriptions Disp Refills    ALPRAZOLAM 0.25 MG Oral Tab [Pharmacy Med Name: ALPRAZOLAM 0.25MG TABLETS] 30 tablet 0     Sig: TAKE 1 TABLET(0.25 MG) BY MOUTH DAILY AS NEEDED FOR ANXIETY       Controlled Substance Medication Failed - 10/18/2024  9:22 AM        Failed - This medication is a controlled substance - forward to provider to refill                 Recent Outpatient Visits              2 weeks ago Rheumatoid arthritis involving multiple sites, unspecified whether rheumatoid factor present ([facility]    Office Visit    5 months ago Medicare annual wellness visit, subsequent    [facility]    Office Visit    8 months ago Chronic diastolic heart failure ([facility])    [facility]    Office Visit    9 months ago Acute on chronic diastolic heart failure ([facility])    [facility] [facility], [facility]    Office Visit    1 year ago Community acquired pneumonia, unspecified laterality    [facility]    Office Visit

## 2024-11-14 ENCOUNTER — PATIENT OUTREACH (OUTPATIENT)
Dept: CASE MANAGEMENT | Age: 89
End: 2024-11-14

## 2024-11-14 DIAGNOSIS — N18.31 STAGE 3A CHRONIC KIDNEY DISEASE (HCC): ICD-10-CM

## 2024-11-14 DIAGNOSIS — E78.5 HYPERLIPIDEMIA ASSOCIATED WITH TYPE 2 DIABETES MELLITUS (HCC): ICD-10-CM

## 2024-11-14 DIAGNOSIS — E66.01 MORBID (SEVERE) OBESITY DUE TO EXCESS CALORIES (HCC): ICD-10-CM

## 2024-11-14 DIAGNOSIS — E11.69 HYPERLIPIDEMIA ASSOCIATED WITH TYPE 2 DIABETES MELLITUS (HCC): ICD-10-CM

## 2024-11-14 DIAGNOSIS — E11.9 CONTROLLED TYPE 2 DIABETES MELLITUS WITHOUT COMPLICATION, WITHOUT LONG-TERM CURRENT USE OF INSULIN (HCC): Primary | ICD-10-CM

## 2024-11-14 DIAGNOSIS — R35.0 URINARY FREQUENCY: ICD-10-CM

## 2024-11-14 NOTE — PROGRESS NOTES
Spoke to Lucie for CCM.      Updates to patient care team/comments: Ezra Castro MD  Patient reported changes in medications: None     Med Adherence  Comment: Pt confirmed she is taking medications as instructed by their providers.     Health Maintenance: The patient reports that she will coordinate a time with her son and daughter to update the vaccines.  Health Maintenance   Topic Date Due    Diabetes Care Dilated Eye Exam  08/20/2022    COVID-19 Vaccine (4 - 2024-25 season) 09/01/2024    Influenza Vaccine (1) 10/01/2024    Zoster Vaccines (2 of 2) 10/12/2024    Diabetes Care Foot Exam  04/28/2025 (Originally 8/24/2022)    Diabetes Care A1C  02/17/2025    Annual Physical  04/25/2025    Diabetes Care: GFR  08/17/2025    Diabetes Care: Microalb/Creat Ratio  08/17/2025    Annual Depression Screening  Completed    Fall Risk Screening (Annual)  Completed    Pneumococcal Vaccine: 65+ Years  Completed     Patient updates/concerns:    The patient reports worsening urinary incontinence, stating she is using several depends daily and occasionally soiling herself. She suspects a prolapse bladder as the cause of her symtoms. She has an appointment scheduled with a uro-gynecologist out of [facility].     Goals/Action Plan:    Active goal from previous outreach:   What: Check to see if she qualifies for a portable oxygen tank  Where,when, how: by following up with her current DME company to see if they have portable oxygen tanks available.  Patient reported progress towards goals:               - What: She continues to work towards getting a portable oxygen tank.            - Where/When/How: She has been looking into purchasing her own oxygen tanks though a third party. Son and daughter are assisting with this matter. She continues to use oxygen at home at 2 liters as needed.  Patient Reported Barriers and Concerns: Oxygen was prescribed as continuous, she is only using it prn.                   - Plan for overcoming  barriers: She will make more of an effort to using it at all times.     Care Managers Interventions:     I encouraged the patient to use her oxygen as directed, continuous.     I advised her to continue to monitor her symtoms and maintain hygiene to prevent skin irritation or infection. I encouraged her to contact triage if new symtoms arise or they worsen. She declined an appointment with Dr. Marmolejo at this time.    Nutrition and exercise counseling: I encouraged the patient to consume three balanced meals daily and discussed the importance of incorporating daily exercise, including gentle stretches. To improve overall health.    Chat review and documentation: I thoroughly reviewed the patient chart and updated Care Everywhere.    Immunization Status: I conducted an [employer] immunization query, which confirmed that no updates were necessary at this time.    Social determinants of health: I updated social determinants of health to reflect any recent changes.    Patient support: I actively listened to the patient's concern's, provided emotional support and encouraged the patient to reach out, if she needs further assistance.     Reviewed Future Appointments: No future appointments.    Next Care Manager Follow Up Date: One month. Encouraged patient to call as needed.    Reason For Follow Up: review progress and or barriers towards patient's goals.     Time Spent This Encounter Total: 27 min medical record review, telephone communication, care plan updates where needed, education, goals, and action plan recreation/update. Provided acknowledgment and validation to patient's concerns.   Monthly Minute Total including today: 27 min  Physical assessment, complete health history, and need for CCM established by Narayan Marmolejo MD.  Friendly reminder - 2025 Benefits Open Enrollment is here!   We encourage you to review your current Medicare coverage and explore your options during this period. We have developed a  Medicare Information Page on our website to serve as a resource for guidance and answers to any questions you might have.   You can access it by visiting, [email or web address]

## 2024-11-21 DIAGNOSIS — F41.1 GENERALIZED ANXIETY DISORDER: ICD-10-CM

## 2024-11-26 RX ORDER — ALPRAZOLAM 0.25 MG/1
0.25 TABLET ORAL DAILY PRN
Qty: 30 TABLET | Refills: 0 | Status: SHIPPED | OUTPATIENT
Start: 2024-11-26

## 2024-12-06 ENCOUNTER — PATIENT OUTREACH (OUTPATIENT)
Dept: CASE MANAGEMENT | Age: 89
End: 2024-12-06

## 2024-12-06 DIAGNOSIS — E11.9 CONTROLLED TYPE 2 DIABETES MELLITUS WITHOUT COMPLICATION, WITHOUT LONG-TERM CURRENT USE OF INSULIN (HCC): Primary | ICD-10-CM

## 2024-12-06 DIAGNOSIS — Z01.00 DIABETIC EYE EXAM (HCC): ICD-10-CM

## 2024-12-06 DIAGNOSIS — I73.9 PERIPHERAL VASCULAR DISEASE (HCC): ICD-10-CM

## 2024-12-06 DIAGNOSIS — E11.9 DIABETIC EYE EXAM (HCC): ICD-10-CM

## 2024-12-06 DIAGNOSIS — Z91.81 RISK FOR FALLS: ICD-10-CM

## 2024-12-06 NOTE — PROGRESS NOTES
Spoke to Ja for CCM.      Updates to patient care team/comments: None   Patient reported changes in medications: None     Med Adherence  Comment: Pt confirmed she is taking medications as instructed by their providers.     Health Maintenance:   Health Maintenance   Topic Date Due    Diabetes Care Dilated Eye Exam  08/20/2022    COVID-19 Vaccine (4 - 2024-25 season) 09/01/2024    Influenza Vaccine (1) 10/01/2024    Zoster Vaccines (2 of 2) 10/12/2024    Diabetes Care Foot Exam  04/28/2025 (Originally 8/24/2022)    Diabetes Care A1C  02/17/2025    Annual Physical  04/25/2025    Diabetes Care: GFR  08/17/2025    Diabetes Care: Microalb/Creat Ratio  08/17/2025    Annual Depression Screening  Completed    Fall Risk Screening (Annual)  Completed    Pneumococcal Vaccine: 65+ Years  Completed     Patient updates/concerns:     The patient reports being authorized for a state aid assistance twice a week for four hours, She expressed being really content with this arrangement as it will help her with grocery shopping, appointments and household tasks.     She continues to use 2 to 3 liters of oxygen, adjusted based on her activity level.     She has also noted some swelling in her legs but admitted to not taking her prescribed  Furosemide due to inconvenience of frequent urination. She plans to resume the medicaiton starting today.     Additionally, she mentioned having a goof friend staying with her from out of town.    Goals/Action Plan:    Active goal from previous outreach:   What: Check to see if she qualifies for a portable oxygen tank  Where,when, how: by following up with her current DME company to see if they have portable oxygen tanks available.    Patient reported progress towards goals:                - What: The patient reports she continues towards obtaining a portable concentrator.           - Where/When/How: She mentioned being placed on a wait list. She is currently using regular oxygen tank  cylinders.  Patient Reported Barriers and Concerns: She continues to feel intermittent SOB.                    - Plan for overcoming barriers: She will call and follow up with her cardiology and pulmonary physicians.     Care Managers Interventions:   I encouraged the patient to seek medical attention of  SOB worsens. I offered to transfer her to triage, she declined indicating she had company in from out of town. She mentioned that if her symtoms worsened she would have her son take her to ED.     Nutrition and exercise counseling: I encouraged the patient to consume three balanced meals daily and discussed the importance of incorporating daily exercise, including gentle stretches. To improve overall healthy.    Chat review and documentation: I thoroughly reviewed the patient chart and updated Care Everywhere.    Immunization Status: I conducted an IDPH immunization query, which confirmed that no updates were necessary at this time.     Patient support: I actively listened to the patient's concern's, provided emotional support and encouraged the patient to reach out, if she needs further assistance.     Reviewed Future Appointments: No future appointments.    Next Care Manager Follow Up Date: One month. Encouraged patient to call as needed.    Reason For Follow Up: review progress and or barriers towards patient's goals.     Time Spent This Encounter Total: 23 min medical record review, telephone communication, care plan updates where needed, education, goals, and action plan recreation/update. Provided acknowledgment and validation to patient's concerns.   Monthly Minute Total including today: 23 min  Physical assessment, complete health history, and need for CCM established by Narayan Marmolejo MD.  Friendly reminder - 2025 Benefits Open Enrollment is here!   We encourage you to review your current Medicare coverage and explore your options during this period. We have developed a Medicare Information Page on  our website to serve as a resource for guidance and answers to any questions you might have.   You can access it by visiting, [email or web address]

## 2024-12-11 NOTE — TELEPHONE ENCOUNTER
Orders:    AMB POC HEMOGLOBIN A1C    AMB POC GLUCOSE BLOOD, BY GLUCOSE MONITORING DEVICE     Dr. Melania Block pt granddaughter has tested postive for covid-19 Granddaughter does not live with pt but her daughter does and she has been around granddaughter that has tested postive.   Patient will like to know if she should get tested for Covid -19 due t healthcare provider ahead of time and tell them that you have or may have COVID-19.  5. For medical emergencies, call 911 and notify the dispatch personnel that you have or may have COVID-19.   6. Cover your cough and sneezes.    7. Wash your hands often wi under home isolation precautions following the below guidelines:  • At least 3 days (72 hours) have passed since recovery defined as resolution of fever without the use of fever-reducing medications; and  • Improvement in respiratory symptoms (e.g., cough,

## 2024-12-31 DIAGNOSIS — F41.1 GENERALIZED ANXIETY DISORDER: ICD-10-CM

## 2025-01-06 RX ORDER — ALPRAZOLAM 0.25 MG/1
0.25 TABLET ORAL DAILY PRN
Qty: 30 TABLET | Refills: 0 | OUTPATIENT
Start: 2025-01-06

## 2025-01-06 RX ORDER — ALPRAZOLAM 0.25 MG/1
0.25 TABLET ORAL DAILY PRN
Qty: 30 TABLET | Refills: 0 | Status: SHIPPED | OUTPATIENT
Start: 2025-01-06

## 2025-01-06 NOTE — TELEPHONE ENCOUNTER
Please review; protocol failed/ has no protocol    Sari Kent minutes ago (4:12 PM)     BA  Patient calling to follow up on refil        Recent fills: 11/26/2024,10/18/2024.09/04/2024  Last Rx written: 11/26/2024  Last Office Visit: 09/28/2024    Recent Visits  Date Type Provider Dept   09/28/24 Office Visit [facility]ayan Marmolejo [facility]           Requested Prescriptions   Pending Prescriptions Disp Refills    ALPRAZOLAM 0.25 MG Oral Tab [Pharmacy Med Name: ALPRAZOLAM 0.25MG TABLETS] 30 tablet 0     Sig: TAKE 1 TABLET(0.25 MG) BY MOUTH DAILY AS NEEDED FOR ANXIETY       Controlled Substance Medication Failed - 1/6/2025  4:15 PM        Failed - This medication is a controlled substance - forward to provider to refill           Recent Outpatient Visits              3 months ago Rheumatoid arthritis involving multiple sites, unspecified whether rheumatoid factor present (HCC)    [facility] [address], Narayan Salgado MD    Office Visit    8 months ago Medicare annual wellness visit, subsequent    [facility], Narayan Salgado MD    Office Visit    11 months ago Chronic diastolic heart failure ([facility]    Office Visit    12 months ago Acute on chronic diastolic heart failure ([facility]    Office Visit    1 year ago Community acquired pneumonia, unspecified laterality    [facility]    Office Visit

## 2025-01-06 NOTE — TELEPHONE ENCOUNTER
Please review. Protocol Failed; No Protocol      Recent fills: 10/18/2024, 11/26/2024  Last Rx written: 11/16/2024  Last office visit: 9/28/2024      Requested Prescriptions   Pending Prescriptions Disp Refills    ALPRAZOLAM 0.25 MG Oral Tab [Pharmacy Med Name: ALPRAZOLAM 0.25MG TABLETS] 30 tablet 0     Sig: TAKE 1 TABLET(0.25 MG) BY MOUTH DAILY AS NEEDED FOR ANXIETY       Controlled Substance Medication Failed - 1/6/2025  4:16 PM        Failed - This medication is a controlled substance - forward to provider to refill                 Recent Outpatient Visits              3 months ago Rheumatoid arthritis involving multiple sites, unspecified whether rheumatoid factor present ([facility]    Office Visit    8 months ago Medicare annual wellness visit, subsequent    [facility]    Office Visit    11 months ago Chronic diastolic heart failure ([facility]    Office Visit    12 months ago Acute on chronic diastolic heart failure ([facility])    [facility] Emmanuel, [facility]    Office Visit    1 year ago Community acquired pneumonia, unspecified laterality    [facility]    Office Visit

## 2025-01-09 ENCOUNTER — PATIENT OUTREACH (OUTPATIENT)
Dept: CASE MANAGEMENT | Age: 89
End: 2025-01-09

## 2025-01-09 DIAGNOSIS — E78.5 HYPERLIPIDEMIA ASSOCIATED WITH TYPE 2 DIABETES MELLITUS (HCC): ICD-10-CM

## 2025-01-09 DIAGNOSIS — M06.9 RHEUMATOID ARTHRITIS INVOLVING MULTIPLE SITES, UNSPECIFIED WHETHER RHEUMATOID FACTOR PRESENT (HCC): ICD-10-CM

## 2025-01-09 DIAGNOSIS — E11.69 HYPERLIPIDEMIA ASSOCIATED WITH TYPE 2 DIABETES MELLITUS (HCC): ICD-10-CM

## 2025-01-09 DIAGNOSIS — E11.9 CONTROLLED TYPE 2 DIABETES MELLITUS WITHOUT COMPLICATION, WITHOUT LONG-TERM CURRENT USE OF INSULIN (HCC): Primary | ICD-10-CM

## 2025-01-09 DIAGNOSIS — I73.9 PERIPHERAL VASCULAR DISEASE (HCC): ICD-10-CM

## 2025-01-09 DIAGNOSIS — E11.59 HYPERTENSION ASSOCIATED WITH TYPE 2 DIABETES MELLITUS (HCC): ICD-10-CM

## 2025-01-09 DIAGNOSIS — I15.2 HYPERTENSION ASSOCIATED WITH TYPE 2 DIABETES MELLITUS (HCC): ICD-10-CM

## 2025-01-09 NOTE — PROGRESS NOTES
Spoke to Lucie for CCM.      Updates to patient care team/comments: None     Patient reported changes in medications: Rheumatologist Dr. Vazquez  increased Methotrexate to 8 pills a day, four pills in the morning and 4 pills in the evening.     Med Adherence  Comment: Pt confirmed she is taking medications as instructed by their providers.     Health Maintenance:   Health Maintenance   Topic Date Due    Diabetes Care Dilated Eye Exam  08/20/2022    COVID-19 Vaccine (4 - 2024-25 season) 09/01/2024    Influenza Vaccine (1) 10/01/2024 Updated    Zoster Vaccines (2 of 2) 10/12/2024 Reminded the patient that she is due for the second dose.    Annual Depression Screening  01/01/2025 Updated    Fall Risk Screening (Annual)  01/01/2025 Updated    Diabetes Care: Foot Exam (Annual)  01/01/2025    Diabetes Care: Microalb/Creat Ratio (Annual)  01/01/2025    Diabetes Care A1C  02/17/2025    Annual Physical  04/25/2025 Assisted in scheduling    Diabetes Care: GFR  08/17/2025    Pneumococcal Vaccine: 50+ Years  Completed    Meningococcal B Vaccine  Aged Out     Patient updates/concerns:     The patient reports that she decided to stop using her oxygen for a while because she dislikes wearing it. However, as of January 1st, she realized she needs it. She is on two liters when over exerting. She recently obtained a portable oxygen tank but has not used it yet because she does not know how to operate it. She is waiting for her son or daughter to assist her.     Additionally, the patient mentioned experiencing significant pain form arthritis. She saw her rheumatologist, Dr. Vazquez, yesterday, who informed her that arthritis can flare up during the winter, He increased her Methotrexate dosage to 8 pills once a week.     She also shared that she has a [facility] appointed caregiver named Vicky who visits 1-2 times a week to help clean and run errands. She expressed that she absolutely adores Vicky.     Goals/Action Plan:    Active goal  from previous outreach:   hat: Check to see if she qualifies for a portable oxygen tank  Where,when, how: by following up with her current DME company to see if they have portable oxygen tanks available.  Patient reported progress towards goals: met               - What: A portable oxygen tank was delivered.           - Where/When/How: She mentioned having the oxygen tank for two weeks. Additionaly, she continues to use the concentrator.   Patient Reported Barriers and Concerns: She is not sure who to use the portable oxygen tank.                   - Plan for overcoming barriers: She will reach out to her son and daughter to assist in teaching her how to use it . If necessary she will call DME company.     Care Managers Interventions:   I reminded the patient that she is due for her second Shingles vaccines before February 2025.    I assisted in scheduling her Medicare px    Nutrition and exercise counseling: I encouraged the patient to consume three balanced meals daily and discussed the importance of incorporating daily exercise, including gentle stretches. To improve overall health.    Chat review and documentation: I thoroughly reviewed the patient chart and updated Care Everywhere.    Risk Screening: The patient's fall  and depression screen were reassessed and updated accordingly.     Immunization Status: I conducted an IDPH immunization query, which confirmed that she had a Flu vaccine.     Social determinants of health: I updated social determinants of health to reflect any recent changes.    Patient support: I actively listened to the patient's concern's, provided emotional support and encouraged the patient to reach out, if she needs further assistance.     Reviewed Future Appointments: No future appointments.    Next Care Manager Follow Up Date: One month. Encouraged patient to call as needed.    Reason For Follow Up: review progress and or barriers towards patient's goals.     Time Spent This Encounter  Total: 28 min medical record review, telephone communication, care plan updates where needed, education, goals, and action plan recreation/update. Provided acknowledgment and validation to patient's concerns.   Monthly Minute Total including today: 28 min  Physical assessment, complete health history, and need for CCM established by Narayan Marmolejo MD.  Friendly reminder - 2025 Benefits Open Enrollment is here!   We encourage you to review your current Medicare coverage and explore your options during this period. We have developed a Medicare Information Page on our website to serve as a resource for guidance and answers to any questions you might have.   You can access it by visiting, [email or web address]

## 2025-01-16 ENCOUNTER — MED REC SCAN ONLY (OUTPATIENT)
Dept: INTERNAL MEDICINE CLINIC | Facility: CLINIC | Age: 89
End: 2025-01-16

## 2025-01-20 ENCOUNTER — TELEPHONE (OUTPATIENT)
Dept: INTERNAL MEDICINE CLINIC | Facility: CLINIC | Age: 89
End: 2025-01-20

## 2025-01-20 NOTE — TELEPHONE ENCOUNTER
Received order for incontinence supplies from [employer]. From has been placed on Dr. Marmolejo's desk for review and signature.

## 2025-01-27 ENCOUNTER — TELEPHONE (OUTPATIENT)
Dept: INTERNAL MEDICINE CLINIC | Facility: CLINIC | Age: 89
End: 2025-01-27

## 2025-01-27 DIAGNOSIS — F41.1 GENERALIZED ANXIETY DISORDER: ICD-10-CM

## 2025-01-27 NOTE — TELEPHONE ENCOUNTER
Attempted to call the patient's home number, unable to leave message, voice mailbox is full. Called patient's mobile, unable to leave message, phone rang and then busy tone.     RN please try again. Thank you.

## 2025-01-27 NOTE — TELEPHONE ENCOUNTER
Patient called about Xanax. Confirmed with her she picked up 1/6/25 #30. She states she does not have more than 3 left. She also said she may take then the directions. Advised her the prescription states 1 daily as needed, not 1-2 as needed. Transferred to RN to discuss

## 2025-01-28 NOTE — TELEPHONE ENCOUNTER
Patient is NOT MyChart active.   SECOND ATTEMPT.   RN contacted Home Phone: [phone], voice mail is full.  RN called Cell Phone: [phone],rings and then  busy signal.  RN called Sav (son =release of information ) ==[phone]==left message to contact the office regarding patient's medication ==office number and hours provided.

## 2025-01-29 RX ORDER — ALPRAZOLAM 0.25 MG/1
0.25 TABLET ORAL DAILY PRN
Qty: 30 TABLET | Refills: 0 | Status: SHIPPED | OUTPATIENT
Start: 2025-01-29

## 2025-01-29 NOTE — TELEPHONE ENCOUNTER
Please review. Protocol Failed; No Protocol    Summary: alprazolam   Patient called on status of refill. Patient has one pill left.       She dropped bottle on floor, but she not able to find pills left in bottle.   (Patient having a more anxiety due to mice infestation in home and has had exterminatory come over )          Note         Recent fills: 11/26/2024, 1/7/2025  Last Rx written: 1/6/2025  Requesting early   Last office visit: 9/28/2024    Future Appointments  Date Type Provider Dept   04/29/25 Appointment Narayan Marmolejo MD [facility]   Showing future appointments within next 150 days with a meds authorizing provider and meeting all other requirements        Requested Prescriptions   Pending Prescriptions Disp Refills    ALPRAZOLAM 0.25 MG Oral Tab [Pharmacy Med Name: ALPRAZOLAM 0.25MG TABLETS] 30 tablet 0     Sig: TAKE 1 TABLET(0.25 MG) BY MOUTH DAILY AS NEEDED FOR ANXIETY       Controlled Substance Medication Failed - 1/29/2025  2:30 PM        Failed - This medication is a controlled substance - forward to provider to refill        Passed - Medication is active on med list               Future Appointments         Provider Department Appt Notes    In 3 months Narayan Marmolejo MD [facility]           Recent Outpatient Visits              4 months ago Rheumatoid arthritis involving multiple sites, unspecified whether rheumatoid factor present (HCC)    [facility], Narayan Salgado MD    Office Visit    9 months ago Medicare annual wellness visit, subsequent    [facility] [place] Narayan Marmolejo MD    Office Visit    11 months ago Chronic diastolic heart failure (HCC)    [facility] Narayan Salgado MD    Office Visit    1 year ago Acute on chronic diastolic heart failure (HCC)    [facility]  [place], Narayan Salgado MD    Office Visit    1 year ago Community acquired pneumonia, unspecified laterality    [facility] [address], Narayan Salgado MD    Office Visit

## 2025-01-29 NOTE — TELEPHONE ENCOUNTER
Patient called on status of refill. Patient has one pill left.      She dropped bottle on floor, but she not able to find pills left in bottle.   (Patient having a more anxiety due to mice infestation in home and has had exterminatory come over )

## 2025-02-04 ENCOUNTER — PATIENT OUTREACH (OUTPATIENT)
Dept: CASE MANAGEMENT | Age: 89
End: 2025-02-04

## 2025-02-04 DIAGNOSIS — E78.5 HYPERLIPIDEMIA ASSOCIATED WITH TYPE 2 DIABETES MELLITUS (HCC): ICD-10-CM

## 2025-02-04 DIAGNOSIS — I15.2 HYPERTENSION ASSOCIATED WITH TYPE 2 DIABETES MELLITUS (HCC): ICD-10-CM

## 2025-02-04 DIAGNOSIS — E11.69 HYPERLIPIDEMIA ASSOCIATED WITH TYPE 2 DIABETES MELLITUS (HCC): ICD-10-CM

## 2025-02-04 DIAGNOSIS — E11.9 CONTROLLED TYPE 2 DIABETES MELLITUS WITHOUT COMPLICATION, WITHOUT LONG-TERM CURRENT USE OF INSULIN (HCC): Primary | ICD-10-CM

## 2025-02-04 DIAGNOSIS — E11.59 HYPERTENSION ASSOCIATED WITH TYPE 2 DIABETES MELLITUS (HCC): ICD-10-CM

## 2025-02-04 DIAGNOSIS — I25.10 CORONARY ARTERIOSCLEROSIS IN NATIVE ARTERY: ICD-10-CM

## 2025-02-05 NOTE — PROGRESS NOTES
Spoke to Lucie for CCM.      Updates to patient care team/comments: None   Patient reported changes in medications: None     Med Adherence  Comment: Pt confirmed she is taking medications as instructed by their providers.     Health Maintenance:  She mentioned that she will speak with her son and daughter to help updated her preventative care.  Health Maintenance   Topic Date Due    Diabetes Care Dilated Eye Exam  08/20/2022    COVID-19 Vaccine (4 - 2024-25 season) 09/01/2024    Zoster Vaccines (2 of 2) 10/12/2024    Diabetes Care: Foot Exam (Annual)  01/01/2025    Diabetes Care: Microalb/Creat Ratio (Annual)  01/01/2025    Diabetes Care A1C  02/17/2025    Annual Physical  04/25/2025 Scheduled    Diabetes Care: GFR  08/17/2025    Influenza Vaccine  Completed    Annual Depression Screening  Completed    Fall Risk Screening (Annual)  Completed    Pneumococcal Vaccine: 50+ Years  Completed    Meningococcal B Vaccine  Aged Out       Patient updates/concerns:     The patient reports having a device inserted to assist with incontinence. 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    She notes experiencing chronic flare ups which associate with upcoming rain and weather changes.     She mentioned staying busy attending [facility], with her 32 year old grandson. He takes her once a week, and she enjoys spending time with him.     Goals/Action Plan:    Active goal from previous outreach:   What: Check to see if she qualifies for a portable oxygen tank  Where,when, how: by following up with her current DME company to see if they have portable oxygen tanks available.  Patient reported progress towards goals:                - What: She obtained a portable oxygen tank.           - Where/When/How: States her DME company provided her with a portable oxygen tank.  Patient Reported Barriers and Concerns: None                    - Plan for overcoming barriers: n/a    Care Managers Interventions:     ARTUR OLIVAS NP [facility] - UROGYN - [facility]  [address]   [place] [address]    Notes  Date Note Type Note Provider Name and Address Organization Details Recorded Time   01/28/2025 text/html Pt here for f/u on pessary insertion. She is happy with the device. She does feel like she has to push hard to pass stool, like the pessary is blocking the stool from coming out. She denies hard stools or constipation. ARTUR OLIVAS NP  [address], [place], [address], [facility] 01/28/2025 15:32:54     Nutrition and exercise counseling: I encouraged the patient to consume three balanced meals daily and discussed the importance of incorporating daily exercise, including gentle stretches. To improve overall health    Chat review and documentation: I thoroughly reviewed the patient chart and updated Care Everywhere.    Immunization Status: I conducted an IDPH immunization query, which confirmed that no updates were necessary at this time.     Social determinants of health: I updated social determinants of health to reflect any recent changes.    Patient support: I actively listened to the patient's concern's, provided emotional support and encouraged the patient to reach out, if she needs further assistance.     Reviewed Future Appointments:   Future Appointments   Date Time Provider Department Center   4/29/2025  5:00 PM Narayan Marmolejo MD [facility] Next Care Manager Follow Up Date: One month. Encouraged patient to call as needed.    Reason For Follow Up: review progress and or barriers towards patient's goals.     Time Spent This Encounter Total: 26 min medical record review, telephone communication, care plan updates where needed, education, goals, and action plan recreation/update. Provided acknowledgment and validation to patient's  concerns.   Monthly Minute Total including today: 26 min  Physical assessment, complete health history, and need for CCM established by Narayan Marmolejo MD.  Friendly reminder - 2025 Benefits Open Enrollment is here!   We encourage you to review your current Medicare coverage and explore your options during this period. We have developed a Medicare Information Page on our website to serve as a resource for guidance and answers to any questions you might have.   You can access it by visiting, [email or web address]

## 2025-02-25 ENCOUNTER — TELEPHONE (OUTPATIENT)
Dept: INTERNAL MEDICINE CLINIC | Facility: CLINIC | Age: 89
End: 2025-02-25

## 2025-02-26 NOTE — TELEPHONE ENCOUNTER
Received Comprehensive Neurology Testing Requisition Form from [facility]. Form has been placed on Dr. Marmolejo's desk for review and signature.

## 2025-02-27 NOTE — TELEPHONE ENCOUNTER
[facility]/[employer] [facility] [employer] phone # [phone]    Anil calling again regarding the request that is time sensitive and needing this today. Signed by Dr. Adamson.     Comprehensive Neurology Testing Requisition Form from [facility]. Fax to this different fax number.[phone]

## 2025-02-27 NOTE — TELEPHONE ENCOUNTER
I dont order these test. If pt is having neurologic issues currently, she needs to see me in clinic for eval.

## 2025-02-27 NOTE — TELEPHONE ENCOUNTER
Reached out to patient. Name and  has been verified. Patient states she did not request any Neurological Testing from [facility] [employer]. She also she is not having any neurological issues.

## 2025-03-11 ENCOUNTER — MED REC SCAN ONLY (OUTPATIENT)
Dept: FAMILY MEDICINE CLINIC | Facility: CLINIC | Age: 89
End: 2025-03-11

## 2025-03-20 ENCOUNTER — PATIENT OUTREACH (OUTPATIENT)
Dept: CASE MANAGEMENT | Age: 89
End: 2025-03-20

## 2025-03-20 DIAGNOSIS — E11.9 CONTROLLED TYPE 2 DIABETES MELLITUS WITHOUT COMPLICATION, WITHOUT LONG-TERM CURRENT USE OF INSULIN (HCC): Primary | ICD-10-CM

## 2025-03-20 DIAGNOSIS — I15.2 HYPERTENSION ASSOCIATED WITH TYPE 2 DIABETES MELLITUS (HCC): ICD-10-CM

## 2025-03-20 DIAGNOSIS — E11.59 HYPERTENSION ASSOCIATED WITH TYPE 2 DIABETES MELLITUS (HCC): ICD-10-CM

## 2025-03-20 DIAGNOSIS — E78.5 HYPERLIPIDEMIA ASSOCIATED WITH TYPE 2 DIABETES MELLITUS (HCC): ICD-10-CM

## 2025-03-20 DIAGNOSIS — E11.69 HYPERLIPIDEMIA ASSOCIATED WITH TYPE 2 DIABETES MELLITUS (HCC): ICD-10-CM

## 2025-03-20 NOTE — PROGRESS NOTES
Spoke to Lucie for CCM.      Updates to patient care team/comments: None   Patient reported changes in medications: None     Med Adherence  Comment: Pt confirmed she is taking medications as instructed by their providers.     Health Maintenance: the patient states she will wait for her PX to update her preventative care.  Health Maintenance   Topic Date Due    Diabetes Care Dilated Eye Exam  08/20/2022    COVID-19 Vaccine (4 - 2024-25 season) 09/01/2024    Zoster Vaccines (2 of 2) 10/12/2024    Diabetes Care: Foot Exam (Annual)  01/01/2025    Diabetes Care: Microalb/Creat Ratio (Annual)  01/01/2025    Diabetes Care A1C  02/17/2025    Annual Physical  04/25/2025 Scheduled    Diabetes Care: GFR  08/17/2025    Influenza Vaccine  Completed    Annual Depression Screening  Completed    Fall Risk Screening (Annual)  Completed    Pneumococcal Vaccine: 50+ Years  Completed    Meningococcal B Vaccine  Aged Out     Patient updates/concerns:     The patient reports she is overall doing okay. She is scheduled to see an ophthalmologist today in [place]. States she woke up with crusted eyes, they are a little pink and she has noted that they are extremely watery.      Goals/Action Plan:    Active goal from previous outreach:   What: Check to see if she qualifies for a portable oxygen tank  Where,when, how: by following up with her current DME company to see if they have portable oxygen tanks available.  Patient reported progress towards goals: goal met. She obtained portable oxygen tank from her DME company.       NEW goal           - What: Seek medical assistance for bilateral eye redness.           - Where/When/How: the patient will follow up with opthalmology. She will call back with the opthalmology information and will obtain copies of her last DM eye exam or will have the office fax it to Dr. Marmolejo office.  Patient Reported Barriers and Concerns: None                   - Plan for overcoming barriers: n/a    Care  Managers Interventions:   I encouraged the patient to have the opthalmology fax the office progress notes from her last DM eye exam to Dr. Marmolejo.     Nutrition and exercise counseling: I encouraged the patient to consume three balanced meals daily and discussed the importance of incorporating daily exercise, including gentle stretches. To improve overall health.    Chat review and documentation: I thoroughly reviewed the patient chart and updated Care Everywhere.    Immunization Status: I conducted an IDPH immunization query, which confirmed that no updates were necessary at this time.     Patient support: I actively listened to the patient's concern's, provided emotional support and encouraged the patient to reach out, if she needs further assistance.     Reviewed Future Appointments:   Future Appointments   Date Time Provider Department Center   4/29/2025  5:00 PM Narayan Marmolejo MD [facility]       Next Care Manager Follow Up Date: One month. Encouraged patient to call as needed.    Reason For Follow Up: review progress and or barriers towards patient's goals.     Time Spent This Encounter Total: 22 min medical record review, telephone communication, care plan updates where needed, education, goals, and action plan recreation/update. Provided acknowledgment and validation to patient's concerns.   Monthly Minute Total including today: 22 min  Physical assessment, complete health history, and need for CCM established by Narayan Marmolejo MD.  Friendly reminder - 2025 Benefits Open Enrollment is here!   We encourage you to review your current Medicare coverage and explore your options during this period. We have developed a Medicare Information Page on our website to serve as a resource for guidance and answers to any questions you might have.   You can access it by visiting, [email or web address]

## 2025-03-26 DIAGNOSIS — F41.1 GENERALIZED ANXIETY DISORDER: ICD-10-CM

## 2025-03-31 NOTE — TELEPHONE ENCOUNTER
02/05/2025  LAST WRITTEN: 01/29/2025  Quantity: 30\  Recent Visits  Date Type Provider Dept   09/28/24 Office Visit Narayan Marmolejo MD [facility]   04/25/24 Office Visit Narayan Marmolejo MD [facility]   02/09/24 Office Visit Narayan Marmolejo MD [facility]   01/11/24 Office Visit Narayan Marmolejo MD [facility]   Showing recent visits within past 540 days with a meds authorizing provider and meeting all other requirements  Future Appointments  Date Type Provider Dept   04/29/25 Appointment Narayan Marmolejo MD [facility]   Showing future appointments within next 150 days with a meds authorizing provider and meeting all other requirements

## 2025-04-01 RX ORDER — ALPRAZOLAM 0.25 MG
0.25 TABLET ORAL DAILY PRN
Qty: 30 TABLET | Refills: 0 | Status: SHIPPED | OUTPATIENT
Start: 2025-04-01

## 2025-04-03 ENCOUNTER — TELEPHONE (OUTPATIENT)
Dept: INTERNAL MEDICINE CLINIC | Facility: CLINIC | Age: 89
End: 2025-04-03

## 2025-04-03 NOTE — TELEPHONE ENCOUNTER
Received Prescription/Certificate of Medical Necessity from [employer]. Form has been placed on Dr Marmolejo's desk for review and signature.

## 2025-04-16 ENCOUNTER — PATIENT OUTREACH (OUTPATIENT)
Dept: CASE MANAGEMENT | Age: 89
End: 2025-04-16

## 2025-04-16 ENCOUNTER — TELEPHONE (OUTPATIENT)
Dept: INTERNAL MEDICINE CLINIC | Facility: CLINIC | Age: 89
End: 2025-04-16

## 2025-04-16 DIAGNOSIS — E78.5 HYPERLIPIDEMIA ASSOCIATED WITH TYPE 2 DIABETES MELLITUS (HCC): ICD-10-CM

## 2025-04-16 DIAGNOSIS — I15.2 HYPERTENSION ASSOCIATED WITH TYPE 2 DIABETES MELLITUS (HCC): Primary | ICD-10-CM

## 2025-04-16 DIAGNOSIS — E11.9 CONTROLLED TYPE 2 DIABETES MELLITUS WITHOUT COMPLICATION, WITHOUT LONG-TERM CURRENT USE OF INSULIN (HCC): ICD-10-CM

## 2025-04-16 DIAGNOSIS — I50.32 CHRONIC DIASTOLIC HEART FAILURE (HCC): ICD-10-CM

## 2025-04-16 DIAGNOSIS — E11.59 HYPERTENSION ASSOCIATED WITH TYPE 2 DIABETES MELLITUS (HCC): Primary | ICD-10-CM

## 2025-04-16 DIAGNOSIS — E11.69 HYPERLIPIDEMIA ASSOCIATED WITH TYPE 2 DIABETES MELLITUS (HCC): ICD-10-CM

## 2025-04-16 NOTE — PROGRESS NOTES
Spoke to Lucie for CCM.      Updates to patient care team/comments: None   Patient reported changes in medications: None     Med Adherence  Comment: Pt confirmed she is taking medications as instructed by their providers.     Health Maintenance:   Health Maintenance   Topic Date Due    Diabetes Care Dilated Eye Exam  08/20/2022    COVID-19 Vaccine (4 - 2024-25 season) 09/01/2024    Zoster Vaccines (2 of 2) 10/12/2024    Diabetes Care: Foot Exam (Annual)  01/01/2025    Diabetes Care: Microalb/Creat Ratio (Annual)  01/01/2025    Diabetes Care A1C  02/17/2025    Annual Physical  04/25/2025    Diabetes Care: GFR  08/17/2025    Influenza Vaccine  Completed    Annual Depression Screening  Completed    Fall Risk Screening (Annual)  Completed    Pneumococcal Vaccine: 50+ Years  Completed    Meningococcal B Vaccine  Aged Out     Patient updates/concerns:    The patient reports that she was admitted to [facility] for approximately one week due to shortness of breath. Upon discharge, she was given orders for continuous oxygen therapy at 2 liters and is utilizing a portable oxygen concentrator for mobility. She is receiving home health services and physical therapy, and has a follow up appontment scheduled with Dr. Adamson for a post hospital follow up.    Goals/Action Plan:    Active goal from previous outreach:    - What: Seek medical assistance for bilateral eye redness.           - Where/When/How: the patient will follow up with opthalmology. She will call back with the opthalmology information and will obtain copies of her last DM eye exam or will have the office fax it to Dr. Marmolejo office.  Patient reported progress towards goals: New goal set:           - What: Follow up in the office with Dr. Marmolejo           - Where/When/How: Appt scheduled for 4/24/25  Patient Reported Barriers and Concerns: Transportation                   - Plan for overcoming barriers: She will ask her son, daughter or grandson to  drive her to the appt.     Care Managers Interventions:     Nutrition and exercise counseling: I encouraged the patient to consume three balanced meals daily and discussed the importance of incorporating daily exercise, including gentle stretches. To improve overall health.    Chat review and documentation: I thoroughly reviewed the patient chart and updated Care Everywhere.    Social determinants of health: I updated social determinants of health to reflect any recent changes.    Patient support: I actively listened to the patient's concern's, provided emotional support and encouraged the patient to reach out, if she needs further assistance.     Reviewed Future Appointments:   Future Appointments   Date Time Provider Department Center   4/24/2025  9:30 AM Narayan Marmolejo MD [facility]   4/29/2025  5:00 PM Narayan Marmolejo MD [facility]       Next Care Manager Follow Up Date: One month. Encouraged patient to call as needed.    Reason For Follow Up: review progress and or barriers towards patient's goals.     Time Spent This Encounter Total: 24 min medical record review, telephone communication, care plan updates where needed, education, goals, and action plan recreation/update. Provided acknowledgment and validation to patient's concerns.   Monthly Minute Total including today: 24 min  Physical assessment, complete health history, and need for CCM established by Narayan Marmolejo MD.  Friendly reminder - 2025 Benefits Open Enrollment is here!   We encourage you to review your current Medicare coverage and explore your options during this period. We have developed a Medicare Information Page on our website to serve as a resource for guidance and answers to any questions you might have.   You can access it by visiting, [email or web address]

## 2025-04-16 NOTE — TELEPHONE ENCOUNTER
Serjio from [facility] is calling to ask if provider will follow home health orders

## 2025-04-16 NOTE — TELEPHONE ENCOUNTER
Spoke with Serjio from [facility]. Informed Serjio Marmolejo will sign off on home health orders.

## 2025-04-21 PROBLEM — J96.01 ACUTE RESPIRATORY FAILURE WITH HYPOXIA (HCC): Status: RESOLVED | Noted: 2025-04-21 | Resolved: 2025-04-21

## 2025-04-21 PROBLEM — E66.01 MORBID (SEVERE) OBESITY DUE TO EXCESS CALORIES (HCC): Status: RESOLVED | Noted: 2022-01-18 | Resolved: 2025-04-21

## 2025-04-29 ENCOUNTER — OFFICE VISIT (OUTPATIENT)
Dept: INTERNAL MEDICINE CLINIC | Facility: CLINIC | Age: 89
End: 2025-04-29

## 2025-04-29 VITALS
SYSTOLIC BLOOD PRESSURE: 132 MMHG | BODY MASS INDEX: 30 KG/M2 | HEART RATE: 82 BPM | WEIGHT: 165.31 LBS | OXYGEN SATURATION: 95 % | DIASTOLIC BLOOD PRESSURE: 66 MMHG

## 2025-04-29 DIAGNOSIS — E11.59 HYPERTENSION ASSOCIATED WITH TYPE 2 DIABETES MELLITUS (HCC): ICD-10-CM

## 2025-04-29 DIAGNOSIS — I15.2 HYPERTENSION ASSOCIATED WITH TYPE 2 DIABETES MELLITUS (HCC): ICD-10-CM

## 2025-04-29 DIAGNOSIS — E78.5 HYPERLIPIDEMIA ASSOCIATED WITH TYPE 2 DIABETES MELLITUS (HCC): ICD-10-CM

## 2025-04-29 DIAGNOSIS — N18.31 STAGE 3A CHRONIC KIDNEY DISEASE (HCC): ICD-10-CM

## 2025-04-29 DIAGNOSIS — E11.9 CONTROLLED TYPE 2 DIABETES MELLITUS WITHOUT COMPLICATION, WITHOUT LONG-TERM CURRENT USE OF INSULIN (HCC): ICD-10-CM

## 2025-04-29 DIAGNOSIS — K21.9 GASTROESOPHAGEAL REFLUX DISEASE, UNSPECIFIED WHETHER ESOPHAGITIS PRESENT: ICD-10-CM

## 2025-04-29 DIAGNOSIS — E11.69 HYPERLIPIDEMIA ASSOCIATED WITH TYPE 2 DIABETES MELLITUS (HCC): ICD-10-CM

## 2025-04-29 DIAGNOSIS — M06.9 RHEUMATOID ARTHRITIS INVOLVING MULTIPLE SITES, UNSPECIFIED WHETHER RHEUMATOID FACTOR PRESENT (HCC): ICD-10-CM

## 2025-04-29 DIAGNOSIS — J96.01 ACUTE HYPOXEMIC RESPIRATORY FAILURE (HCC): ICD-10-CM

## 2025-04-29 DIAGNOSIS — I25.10 CORONARY ARTERIOSCLEROSIS IN NATIVE ARTERY: ICD-10-CM

## 2025-04-29 DIAGNOSIS — J90 LOCULATED PLEURAL EFFUSION: ICD-10-CM

## 2025-04-29 DIAGNOSIS — I50.33 ACUTE ON CHRONIC DIASTOLIC HEART FAILURE (HCC): Primary | ICD-10-CM

## 2025-04-29 PROCEDURE — 99214 OFFICE O/P EST MOD 30 MIN: CPT | Performed by: INTERNAL MEDICINE

## 2025-04-30 NOTE — TELEPHONE ENCOUNTER
Received home health order number [id number] from [employer]. Forms has been placed on Dr. Marmolejo's desk for review and signature.

## 2025-05-01 ENCOUNTER — MED REC SCAN ONLY (OUTPATIENT)
Dept: INTERNAL MEDICINE CLINIC | Facility: CLINIC | Age: 89
End: 2025-05-01

## 2025-05-02 ENCOUNTER — TELEPHONE (OUTPATIENT)
Dept: INTERNAL MEDICINE CLINIC | Facility: CLINIC | Age: 89
End: 2025-05-02

## 2025-05-02 NOTE — TELEPHONE ENCOUNTER
Received Home health Occupational Therapy Evaluation request form . Placed on DR.L Saul to sign.

## 2025-05-03 NOTE — PROGRESS NOTES
Subjective:     Patient ID: Lucie Schuster is a 89 year old female.    Patient presents today for posthospital follow-up.  She was accompanied by her son.  She was admitted at [facility] several weeks ago after she came in with increasing shortness of breath and was found to be in acute respiratory failure secondary to acute on chronic diastolic heart failure.  She was also found to have a loculated right pleural effusion.  She was seen by cardiologist Dr. Lowry as well as pulmonologist Dr. Vegas.  She was treated with IV diuretics and continued on her ARB as well as beta-blocker.  She had also had a loculated right pleural effusion.  Initially there was a thought that she may have pneumonia and was given antibiotics empirically but this was later discontinued and felt that most of her symptoms are really related to fluid overload secondary to acute on chronic heart failure.    History/Other:   Review of Systems   Constitutional: Negative.    Respiratory:  Positive for shortness of breath. Negative for cough and wheezing.    Cardiovascular: Negative.    Gastrointestinal: Negative.    Genitourinary: Negative.      Current Medications[1]  Allergies:Allergies[2]    Past Medical History[3]   Past Surgical History[4]   Family History[5]   Social History: Short Social Hx on File[6]     Objective:   Physical Exam  Constitutional:       General: She is not in acute distress.     Appearance: She is obese. She is not ill-appearing, toxic-appearing or diaphoretic.   HENT:      Right Ear: Tympanic membrane, ear canal and external ear normal.      Left Ear: Tympanic membrane, ear canal and external ear normal.   Eyes:      General: No scleral icterus.        Right eye: No discharge.         Left eye: No discharge.   Cardiovascular:      Rate and Rhythm: Normal rate and regular rhythm.      Heart sounds: Murmur heard.   Pulmonary:      Effort: Pulmonary effort is normal. No respiratory distress.       Breath sounds: Normal breath sounds. No stridor. No wheezing or rales.   Musculoskeletal:      Cervical back: Normal range of motion and neck supple. No rigidity or tenderness.      Right lower leg: No edema.      Left lower leg: No edema.   Lymphadenopathy:      Cervical: No cervical adenopathy.   Skin:     Coloration: Skin is not jaundiced or pale.   Neurological:      Mental Status: She is alert.       Assessment & Plan:   (I50.33) Acute on chronic diastolic heart failure (HCC)  (primary encounter diagnosis)  Plan: She now appears to be compensated and euvolemic.  She will continue with her diuretics,  losartan, metoprolol.  Has a follow-up visit set up ready with her cardiologist.    (J90) Loculated pleural effusion  Plan: Patient had a right loculated effusion while she was in the hospital.  Discussions were made by pulmonologist with the patient and at that time the patient did not want to do any further invasive procedures like thoracentesis.  Follow-up with pulmonary service is needed since they are planning to do an outpatient follow-up CT scan in about 6 to 8 weeks from the time she was discharged.  The patient is aware and will make an appointment with pulmonologist Dr. Vegas.    (J96.01) Acute hypoxemic respiratory failure (HCC)  Plan: Discussed improved and resolved, the patient still needs to use oxygen intermittently.    (E11.9) Controlled type 2 diabetes mellitus without complication, without long-term current use of insulin (HCC)  Plan: Recent A1c done while she was in the hospital is 5.8 so her diabetes is well-controlled.  She only takes her glipizide as needed depending on her sugar readings in the morning.    (I25.10) Coronary arteriosclerosis in native artery  Plan: She is on antiplatelet therapy as well as Zetia since the patient is intolerant of statin.  She continues to be followed by her cardiologist Dr. Lowry.    (E11.59,  I15.2) Hypertension associated with type 2 diabetes mellitus  ([facility])  Plan: Blood pressure controlled with current blood pressure meds.  CPM.    (E11.69,  E78.5) Hyperlipidemia associated with type 2 diabetes mellitus ([facility])  Plan: She has been intolerant of statin therapy before.  She is on Zetia as well as red rice yeast.    (N18.31) Stage 3a chronic kidney disease (HCC)  Plan: Stable renal function, avoid NSAIDs.    (M06.9) Rheumatoid arthritis involving multiple sites, unspecified whether rheumatoid factor present ([facility])  Plan: Patient currently on methotrexate.  She continues to follow-up with her rheumatologist.    (K21.9) Gastroesophageal reflux disease, unspecified whether esophagitis present  Plan: Stable on current PPI therapy.  CPM.       No orders of the defined types were placed in this encounter.      Meds This Visit:  Requested Prescriptions      No prescriptions requested or ordered in this encounter       Imaging & Referrals:  None              [1]  Current Outpatient Medications   Medication Sig Dispense Refill   • ALPRAZOLAM 0.25 MG Oral Tab TAKE 1 TABLET(0.25 MG) BY MOUTH DAILY AS NEEDED FOR ANXIETY 30 tablet 0   • predniSONE 10 MG Oral Tab      • APPLE CIDER VINEGAR OR Take 450 mg by mouth.     • CINNAMON OR Take 1,000 mg by mouth daily.     • ezetimibe 10 MG Oral Tab Take 1 tablet (10 mg total) by mouth nightly. 90 tablet 3   • amLODIPine 5 MG Oral Tab Take 1 tablet (5 mg total) by mouth in the morning and 1 tablet (5 mg total) before bedtime. 180 tablet 3   • omeprazole 20 MG Oral Capsule Delayed Release Take 1 capsule (20 mg total) by mouth before breakfast. 90 capsule 3   • gabapentin 300 MG Oral Cap Take 1 capsule (300 mg total) by mouth 3 (three) times daily. 270 capsule 3   • folic acid 1 MG Oral Tab Take 1 tablet (1 mg total) by mouth daily. 90 tablet 0   • Potassium Chloride ER 10 MEQ Oral Tab CR      • losartan 100 MG Oral Tab      • furosemide 20 MG Oral Tab Take 1 tablet (20 mg total) by mouth daily as needed.     • Metoprolol Succinate ER 50 MG  Oral Tablet 24 Hr Take by mouth.     • Multiple Vitamins-Minerals (EYE VITAMINS) Oral Cap Take 1 capsule by mouth daily.     • aspirin 81 MG Oral Tab Take 1 tablet (81 mg total) by mouth daily.     • Calcium Carb-Cholecalciferol (CALCIUM + D3) 600-200 MG-UNIT Oral Tab Take 1 tablet by mouth daily.     • methotrexate (RHEUMATREX) 2.5 MG Oral Tab Take 1 tablet (2.5 mg total) by mouth. TAKE 6 TAB ONCE A WEEK. PATIENT TAKING EVERY Thursday.     • Red Yeast Rice 600 MG Oral Cap Take 2 tablets by mouth daily.     [2]  Allergies  Allergen Reactions   • Sulfa Antibiotics SWELLING   • Atorvastatin MYALGIA   • Irbesartan MYALGIA   • Rosuvastatin MYALGIA   • Amoxicillin OTHER (SEE COMMENTS)     Yeast infection   • Bactrim ITCHING   • Clavulanic Acid OTHER (SEE COMMENTS)     Yeast infection   • Codeine HIVES   • Cefprozil ITCHING     Yeast infection   • Lisinopril Coughing   [3]  Past Medical History:  • Acute subendocardial infarction, subsequent episode of care (HCC)   • Acute, but ill-defined, cerebrovascular disease    had stroke left frontal, parietal, occipital   • Anxiety   • Atherosclerosis of coronary artery   • Carotid stenosis   • Carpal tunnel syndrome, right   • Diabetes (HCC)    as per NG   • Esophageal reflux    as per NG   • Lumbar spinal stenosis   • Obesity   • Osteoarthritis   • Other and unspecified hyperlipidemia    as per NG   • Rheumatoid arthritis (HCC)   • Status post carotid endarterectomy   • Type II or unspecified type diabetes mellitus without mention of complication, not stated as uncontrolled   • Unspecified essential hypertension    as per NG   [4]  Past Surgical History:  Procedure Laterality Date   • Anesth,surgery of shoulder Right    • Angioplasty (coronary)      Angioplasty with Stent; as per NG   • Cholecystectomy     • Colonoscopy     • Electrocardiogram, complete  03-    SCANNED TO MEDIA TAB- 03-   • Knee replacement surgery     • Other surgical history     [5]  Family  History  Problem Relation Age of Onset   • Kidney Disease Father         as per NG   • Lipids Mother         Hyperlipidemia; as per NG   • Hypertension Mother         as per NG   • Heart Disease Mother         CAD; as per NG   [6]  Social History  Socioeconomic History   • Marital status:    Tobacco Use   • Smoking status: Former     Current packs/day: 0.00     Types: Cigarettes     Quit date: 1995     Years since quittin.3     Passive exposure: Past   • Smokeless tobacco: Never   Vaping Use   • Vaping status: Never Used   Substance and Sexual Activity   • Alcohol use: No     Alcohol/week: 0.0 standard drinks of alcohol   • Drug use: No   Other Topics Concern   • Caffeine Concern Yes     Comment: Coffee, 1 cup; as per NG     Social Drivers of Health     Food Insecurity: Food Insecurity Present (2025)    Food Insecurity    • Food Insecurity: Sometimes true   Transportation Needs: No Transportation Needs (2025)    Transportation Needs    • Lack of Transportation: No   Stress: No Stress Concern Present (2025)    Stress    • Feeling of Stress : No   Housing Stability: Low Risk  (2025)    Housing Stability    • Housing Instability: No

## 2025-05-07 ENCOUNTER — PATIENT OUTREACH (OUTPATIENT)
Dept: CASE MANAGEMENT | Age: 89
End: 2025-05-07

## 2025-05-07 NOTE — PROGRESS NOTES
Reviewed pt's chart including recent visits.  Attempted to contact pt for monthly outreach no answer left detailed message for pt to call back.     Pt is due for:     Health Maintenance   Topic Date Due    Diabetes Care Dilated Eye Exam  08/20/2022    Diabetes Care Foot Exam  08/24/2022    COVID-19 Vaccine (4 - 2024-25 season) 09/01/2024    Zoster Vaccines (2 of 2) 10/12/2024    Diabetes Care: Microalb/Creat Ratio (Annual)  01/01/2025    Annual Physical  04/25/2025    Diabetes Care A1C  10/15/2025 (Originally 2/17/2025)    Diabetes Care: GFR  08/17/2025    Influenza Vaccine  Completed    Annual Depression Screening  Completed    Fall Risk Screening (Annual)  Completed    Pneumococcal Vaccine: 50+ Years  Completed    Meningococcal B Vaccine  Aged Out     Chat review and documentation: I thoroughly reviewed the patient chart and updated Care Everywhere.    Immunization Status: I conducted an IDPH immunization query, which confirmed that no updates were necessary at this time.     Reviewed Future Appointments: No future appointments.    Time Spent This Encounter Total: 3 min medical record review, telephone communication, care plan updates where needed, education, goals, and action plan recreation/update. Provided acknowledgment and validation to patient's concerns.   Monthly Minute Total including today: 3 min

## 2025-05-13 ENCOUNTER — TELEPHONE (OUTPATIENT)
Dept: CASE MANAGEMENT | Age: 89
End: 2025-05-13

## 2025-05-13 DIAGNOSIS — H61.20 EXCESSIVE CERUMEN IN EAR CANAL, UNSPECIFIED LATERALITY: Primary | ICD-10-CM

## 2025-05-16 DIAGNOSIS — F41.1 GENERALIZED ANXIETY DISORDER: ICD-10-CM

## 2025-05-16 RX ORDER — GABAPENTIN 300 MG/1
300 CAPSULE ORAL 3 TIMES DAILY
Qty: 270 CAPSULE | Refills: 3 | Status: SHIPPED | OUTPATIENT
Start: 2025-05-16

## 2025-05-16 NOTE — TELEPHONE ENCOUNTER
Please review. Protocol Failed; No Protocol      Recent fills: 2/5/2025, 4/1/2025  Last Rx written: 4/1/2025  Last office visit: 4/29/2025        Future Appointments  Date Type Provider Dept   06/18/25 Appointment Narayan Marmolejo MD [facility]   Showing future appointments within next 150 days with a meds authorizing provider and meeting all other requirements

## 2025-05-18 RX ORDER — ALPRAZOLAM 0.25 MG
0.25 TABLET ORAL DAILY PRN
Qty: 30 TABLET | Refills: 0 | Status: SHIPPED | OUTPATIENT
Start: 2025-05-18

## 2025-05-21 ENCOUNTER — PATIENT OUTREACH (OUTPATIENT)
Dept: CASE MANAGEMENT | Age: 89
End: 2025-05-21

## 2025-05-21 NOTE — PROGRESS NOTES
I spoke with the patient and informed him that she has been graduated from the Chronic care management program ([facility]). She verbalized understanding and states   she/he will contact his primary care physician's office with any future concerns.     Pt graduated and removed from [facility].

## 2025-06-09 ENCOUNTER — TELEPHONE (OUTPATIENT)
Dept: INTERNAL MEDICINE CLINIC | Facility: CLINIC | Age: 89
End: 2025-06-09

## 2025-06-09 NOTE — TELEPHONE ENCOUNTER
Received orders for updated medical records from [facility]. Faxed most recent office notes to  Medical to fax number [phone]. Confirmation has been received that fax went through successfully.

## 2025-06-16 DIAGNOSIS — F41.1 GENERALIZED ANXIETY DISORDER: ICD-10-CM

## 2025-06-18 ENCOUNTER — TELEPHONE (OUTPATIENT)
Dept: INTERNAL MEDICINE CLINIC | Facility: CLINIC | Age: 89
End: 2025-06-18

## 2025-06-18 ENCOUNTER — OFFICE VISIT (OUTPATIENT)
Dept: INTERNAL MEDICINE CLINIC | Facility: CLINIC | Age: 89
End: 2025-06-18

## 2025-06-18 VITALS
HEIGHT: 62 IN | BODY MASS INDEX: 30.62 KG/M2 | SYSTOLIC BLOOD PRESSURE: 132 MMHG | WEIGHT: 166.38 LBS | DIASTOLIC BLOOD PRESSURE: 68 MMHG | HEART RATE: 88 BPM

## 2025-06-18 DIAGNOSIS — E78.5 HYPERLIPIDEMIA ASSOCIATED WITH TYPE 2 DIABETES MELLITUS (HCC): ICD-10-CM

## 2025-06-18 DIAGNOSIS — K21.9 GASTROESOPHAGEAL REFLUX DISEASE, UNSPECIFIED WHETHER ESOPHAGITIS PRESENT: ICD-10-CM

## 2025-06-18 DIAGNOSIS — Z98.890 S/P CAROTID ENDARTERECTOMY: ICD-10-CM

## 2025-06-18 DIAGNOSIS — I70.0 THORACIC AORTIC ATHEROSCLEROSIS: ICD-10-CM

## 2025-06-18 DIAGNOSIS — I15.2 HYPERTENSION ASSOCIATED WITH TYPE 2 DIABETES MELLITUS (HCC): ICD-10-CM

## 2025-06-18 DIAGNOSIS — I25.10 CORONARY ARTERIOSCLEROSIS IN NATIVE ARTERY: ICD-10-CM

## 2025-06-18 DIAGNOSIS — F41.1 GENERALIZED ANXIETY DISORDER: ICD-10-CM

## 2025-06-18 DIAGNOSIS — I50.32 CHRONIC DIASTOLIC HEART FAILURE (HCC): ICD-10-CM

## 2025-06-18 DIAGNOSIS — Z91.81 RISK FOR FALLS: ICD-10-CM

## 2025-06-18 DIAGNOSIS — Z01.00 DIABETIC EYE EXAM (HCC): ICD-10-CM

## 2025-06-18 DIAGNOSIS — E11.9 CONTROLLED TYPE 2 DIABETES MELLITUS WITHOUT COMPLICATION, WITHOUT LONG-TERM CURRENT USE OF INSULIN (HCC): ICD-10-CM

## 2025-06-18 DIAGNOSIS — E11.59 HYPERTENSION ASSOCIATED WITH TYPE 2 DIABETES MELLITUS (HCC): ICD-10-CM

## 2025-06-18 DIAGNOSIS — I73.9 PERIPHERAL VASCULAR DISEASE: ICD-10-CM

## 2025-06-18 DIAGNOSIS — M06.9 RHEUMATOID ARTHRITIS INVOLVING MULTIPLE SITES, UNSPECIFIED WHETHER RHEUMATOID FACTOR PRESENT (HCC): ICD-10-CM

## 2025-06-18 DIAGNOSIS — E11.9 DIABETIC EYE EXAM (HCC): ICD-10-CM

## 2025-06-18 DIAGNOSIS — E11.69 HYPERLIPIDEMIA ASSOCIATED WITH TYPE 2 DIABETES MELLITUS (HCC): ICD-10-CM

## 2025-06-18 DIAGNOSIS — Z00.00 MEDICARE ANNUAL WELLNESS VISIT, SUBSEQUENT: Primary | ICD-10-CM

## 2025-06-18 DIAGNOSIS — N18.31 STAGE 3A CHRONIC KIDNEY DISEASE (HCC): ICD-10-CM

## 2025-06-18 LAB — HEMOGLOBIN A1C: 5.3 % (ref 4.3–5.6)

## 2025-06-18 RX ORDER — ALPRAZOLAM 0.25 MG
0.25 TABLET ORAL DAILY PRN
Qty: 30 TABLET | Refills: 0 | Status: SHIPPED | OUTPATIENT
Start: 2025-06-18

## 2025-06-18 NOTE — TELEPHONE ENCOUNTER
Home Health Discharge Summary received from [facility], placed on Dr Marmolejo's desk for review and signature.

## 2025-06-18 NOTE — PROGRESS NOTES
Subjective:   Lucie Schuster is a 89 year old female who presents for a Subsequent Annual Wellness visit (Pt already had Initial Annual Wellness) and scheduled follow up of multiple significant but stable problems.             Virtual Telephone Check-In    Lucie Schuster verbally {consents to/declines (Can be done by front staff):8330} a Virtual/Telephone Check-In visit on 25.  Patient has been referred to the [facility] website at [email or web address] to review the yearly Consent to Treat document.    Patient understands and accepts financial responsibility for any deductible, co-insurance and/or co-pays associated with this service.    Duration of the service: *** minutes      Summary of topics discussed:       {Assessment and Plan Smarlist and follow up recs (Optional):8606}      Narayan Marmolejo MD          History/Other:   Fall Risk Assessment: {Stan  Fall Risk Assessment:8307}  She has been screened for Falls and is High Risk. Fall Prevention information provided to patient in After Visit Summary.    Do you feel unsteady when standing or walking?: Yes  Do you worry about falling?: No  Have you fallen in the past year?: Yes  How many times have you fallen?: 1  Were you injured?: No     Cognitive Assessment: {Tip  Cognitive Assessment:8307}  She had a completely normal cognitive assessment - see flowsheet entries     Functional Ability/Status: {Tip  Functional Status:8307}  Lucie Schuster has some abnormal functions as listed below:  She has difficulties Affording Meds based on screening of functional status. She has Walking problems based on screening of functional status. She has problems with Memory based on screening of functional status.       Depression Screening (PHQ):{Tip  Depression Screenin}  PHQ-2 SCORE: 0  , done 2025        {Option to record time spent screening & counseling patient for depression (5+ minutes = ):09932::\" \"}    Advanced Directives: {Tip  Advance Care  Plannin}  She does have a Living Will but we do NOT have it on file in Epic.    She does have a POA but we do NOT have it on file in [facility].    Patient has Advance Care Planning documents present in EMR. Reviewed documents with patient (and family/surrogate if present).    {Tip ResultsPMHPSHFHProbListImagingCardioLabAllergiesImm :8307}  Problem List[1]  Allergies:  She is allergic to sulfa antibiotics, atorvastatin, irbesartan, rosuvastatin, amoxicillin, bactrim, clavulanic acid, codeine, cefprozil, and lisinopril.    Current Medications:  Active Meds, Sig Only[2]    Medical History:  She  has a past medical history of Acute subendocardial infarction, subsequent episode of care (HCC) (2020), Acute, but ill-defined, cerebrovascular disease, Anxiety, Atherosclerosis of coronary artery, Carotid stenosis, Carpal tunnel syndrome, right (2016), Diabetes ([facility]), Esophageal reflux, Lumbar spinal stenosis, Obesity, Osteoarthritis, Other and unspecified hyperlipidemia, Rheumatoid arthritis ([facility]), Status post carotid endarterectomy (2015), Type II or unspecified type diabetes mellitus without mention of complication, not stated as uncontrolled, and Unspecified essential hypertension.  Surgical History:  She  has a past surgical history that includes angioplasty (coronary); electrocardiogram, complete (2013); colonoscopy; cholecystectomy; knee replacement surgery; other surgical history; and anesth,surgery of shoulder (Right).   Family History:  Her family history includes Heart Disease in her mother; Hypertension in her mother; Kidney Disease in her father; Lipids in her mother.  Social History:  She  reports that she quit smoking about 30 years ago. Her smoking use included cigarettes. She has been exposed to tobacco smoke. She has never used smokeless tobacco. She reports that she does not drink alcohol and does not use drugs.    Tobacco:  She smoked tobacco in the past but quit greater  than 12 months ago.  Tobacco Use[3]     CAGE Alcohol Screen: {Tip  CAGE Alcohol Screen:8307}  CAGE screening score of 0 on 6/18/2025, showing low risk of alcohol abuse.    {Tip   Care Team:8307}  Patient Care Team:  Narayan Marmolejo MD as PCP - General (Internal Medicine)  Chacorta He (SURGERY, ORTHOPEDIC)  Naveen Vazquez MD (RHEUMATOLOGY)  Deidra Hidalgo (UROLOGY)  Jaron Maharaj MD (UROLOGY)  Chacorta He (SURGERY, ORTHOPEDIC)  Naveen Law (SURGERY, ORTHOPEDIC)  Joe Lowry (CARDIOLOGY)  Teofilo Jean Baptiste as Gastroenterologist (GASTROENTEROLOGY)  Manolo Woodard DPM (PODIATRIST)  Ezra Stallings (OBSTETRICS & GYNECOLOGY)  Nella Tello APRN (Nurse Practitioner Family)    Review of Systems  GENERAL: feels well otherwise  SKIN: denies any unusual skin lesions  EYES: denies blurred vision or double vision  HEENT: denies nasal congestion, sinus pain or ST  LUNGS: denies shortness of breath with exertion  CARDIOVASCULAR: denies chest pain on exertion  GI: denies abdominal pain, denies heartburn  : denies dysuria, vaginal discharge or itching, no complaint of urinary incontinence   MUSCULOSKELETAL: denies back pain  NEURO: denies headaches  PSYCHE: denies depression or anxiety  HEMATOLOGIC: denies hx of anemia  ENDOCRINE: denies thyroid history  ALL/ASTHMA: denies hx of allergy or asthma    Objective:   Physical Exam  General Appearance:  Alert, cooperative, no distress, appears stated age   Head:  Normocephalic, without obvious abnormality, atraumatic   Eyes:  PERRL, conjunctiva/corneas clear, EOM's intact both eyes   Ears:  Normal TM's and external ear canals, both ears   Nose: Nares normal, septum midline,mucosa normal, no drainage or sinus tenderness   Throat: Lips, mucosa, and tongue normal; teeth and gums normal   Neck: Supple, symmetrical, trachea midline, no adenopathy;  thyroid: not enlarged, symmetric, no tenderness/mass/nodules; no carotid bruit or JVD   Back:    Symmetric, no curvature, ROM normal, no CVA tenderness   Lungs:   Clear to auscultation bilaterally, respirations unlabored   Heart:  Regular rate and rhythm, S1 and S2 normal, no murmur, rub, or gallop   Abdomen:   Soft, non-tender, bowel sounds active all four quadrants,  no masses, no organomegaly   Pelvic: Deferred   Extremities: Extremities normal, atraumatic, no cyanosis or edema   Pulses: 2+ and symmetric   Skin: Skin color, texture, turgor normal, no rashes or lesions   Lymph nodes: Cervical, supraclavicular, and axillary nodes normal   Neurologic: Normal       /68 (BP Location: Left arm, Patient Position: Sitting, Cuff Size: large)   Pulse 88   Ht 5' 2\" (1.575 m)   Wt 166 lb 6 oz (75.5 kg)   BMI 30.43 kg/m²  Estimated body mass index is 30.43 kg/m² as calculated from the following:    Height as of this encounter: 5' 2\" (1.575 m).    Weight as of this encounter: 166 lb 6 oz (75.5 kg).    Medicare Hearing Assessment: {Tip (Required for AWV/SWV) Hearing Assessment:8307}  Hearing Screening    Screening Method: Finger Rub  Finger Rub Result: Fail (Comment: Cannot hear from right ear)         Visual Acuity:   Right Eye Visual Acuity: Corrected Right Eye Chart Acuity: 20/30   Left Eye Visual Acuity: Corrected Left Eye Chart Acuity: 20/40   Both Eyes Visual Acuity: Corrected Both Eyes Chart Acuity: 20/25   Able To Tolerate Visual Acuity: Yes        Assessment & Plan:   Lucie Schuster is a 89 year old female who presents for a Medicare Assessment.     1. Medicare annual wellness visit, subsequent (Primary)  2. Controlled type 2 diabetes mellitus without complication, without long-term current use of insulin (HCC)  -     POC Hemoglobin A1C  3. Coronary arteriosclerosis in native artery  4. Hypertension associated with type 2 diabetes mellitus (HCC)  5. Hyperlipidemia associated with type 2 diabetes mellitus (HCC)  6. Stage 3a chronic kidney disease (HCC)  7. Rheumatoid arthritis involving multiple sites,  unspecified whether rheumatoid factor present ([facility])  8. Gastroesophageal reflux disease, unspecified whether esophagitis present  9. Chronic diastolic heart failure (HCC)  10. Thoracic aortic atherosclerosis  11. Peripheral vascular disease  12. Risk for falls  13. S/P carotid endarterectomy  14. Diabetic eye exam (HCC)  -     Cancel: Ophthalmology Referral - In Network  15. Generalized anxiety disorder            The patient indicates understanding of these issues and agrees to the plan.  Reinforced healthy diet, lifestyle, and exercise.    {Tip  Follow Up:8307}  No follow-ups on file.     Narayan Marmolejo MD, 6/18/2025     Supplementary Documentation:   General Health:  In the past six months, have you lost more than 10 pounds without trying?: 2 - No  Has your appetite been poor?: No  Type of Diet: Diabetic  How does the patient maintain a good energy level?: Stretching  How would you describe your daily physical activity?: Light  How would you describe your current health state?: Good  How do you maintain positive mental well-being?: Visiting Friends, Visiting Family  On a scale of 0 to 10, with 0 being no pain and 10 being severe pain, what is your pain level?: 4 - (Moderate)  In the past six months, have you experienced urine leakage?: 1-Yes  At any time do you feel concerned for the safety/well-being of yourself and/or your children, in your home or elsewhere?: No  Have you had any immunizations at another office such as Influenza, Hepatitis B, Tetanus, or Pneumococcal?: Yes    Health Maintenance   Topic Date Due    Diabetes Care Dilated Eye Exam  08/20/2022    Diabetes Care Foot Exam  08/24/2022    COVID-19 Vaccine (4 - 2024-25 season) 09/01/2024    Zoster Vaccines (2 of 2) 10/12/2024    Diabetes Care: Microalb/Creat Ratio (Annual)  01/01/2025    Diabetes Care: GFR  08/17/2025    Diabetes Care A1C  12/18/2025    Annual Physical  06/18/2026    Influenza Vaccine  Completed    Annual Depression Screening   Completed    Fall Risk Screening (Annual)  Completed    Pneumococcal Vaccine: 50+ Years  Completed    Meningococcal B Vaccine  Aged Out            [1]   Patient Active Problem List  Diagnosis    Hypertension associated with type 2 diabetes mellitus (HCC)    Hyperlipidemia associated with type 2 diabetes mellitus (HCC)    Controlled type 2 diabetes mellitus without complication, without long-term current use of insulin (HCC)    Generalized anxiety disorder    Medicare annual wellness visit, subsequent    Rheumatoid arthritis involving multiple sites ([facility])    Risk for falls    Coronary arteriosclerosis in native artery    Stage 3a chronic kidney disease (HCC)    Peripheral vascular disease    Gastroesophageal reflux disease    Thoracic aortic atherosclerosis    Rectal bleeding    Chronic rhinitis    S/P carotid endarterectomy    Urinary frequency    Diabetic eye exam ([facility])    Chronic diastolic heart failure ([facility])   [2]   Outpatient Medications Marked as Taking for the 6/18/25 encounter (Office Visit) with Narayan Marmolejo MD   Medication Sig    [DISCONTINUED] ALPRAZolam 0.25 MG Oral Tab Take 1 tablet (0.25 mg total) by mouth daily as needed for Anxiety.    gabapentin 300 MG Oral Cap Take 1 capsule (300 mg total) by mouth 3 (three) times daily.    predniSONE 10 MG Oral Tab     APPLE CIDER VINEGAR OR Take 450 mg by mouth.    ezetimibe 10 MG Oral Tab Take 1 tablet (10 mg total) by mouth nightly.    amLODIPine 5 MG Oral Tab Take 1 tablet (5 mg total) by mouth in the morning and 1 tablet (5 mg total) before bedtime.    omeprazole 20 MG Oral Capsule Delayed Release Take 1 capsule (20 mg total) by mouth before breakfast.    folic acid 1 MG Oral Tab Take 1 tablet (1 mg total) by mouth daily.    losartan 100 MG Oral Tab     furosemide 20 MG Oral Tab Take 1 tablet (20 mg total) by mouth daily as needed.    aspirin 81 MG Oral Tab Take 1 tablet (81 mg total) by mouth daily.    methotrexate (RHEUMATREX) 2.5 MG Oral Tab Take 1  tablet (2.5 mg total) by mouth. TAKE 6 TAB ONCE A WEEK. PATIENT TAKING EVERY Thursday.    Red Yeast Rice 600 MG Oral Cap Take 2 tablets by mouth daily.   [3]   Social History  Tobacco Use   Smoking Status Former    Current packs/day: 0.00    Types: Cigarettes    Quit date: 1995    Years since quittin.4    Passive exposure: Past   Smokeless Tobacco Never

## 2025-06-21 NOTE — PROGRESS NOTES
Subjective:   Lucie Schuster is a 89 year old female who presents for a Subsequent Annual Wellness visit (Pt already had Initial Annual Wellness) and scheduled follow up of multiple significant but stable problems.               History/Other:   Fall Risk Assessment:   She has been screened for Falls and is High Risk. Fall Prevention information provided to patient in After Visit Summary.    Do you feel unsteady when standing or walking?: Yes  Do you worry about falling?: No  Have you fallen in the past year?: Yes  How many times have you fallen?: 1  Were you injured?: No     Cognitive Assessment:   She had a completely normal cognitive assessment - see flowsheet entries     Functional Ability/Status:   Lucie Schuster has some abnormal functions as listed below:  She has difficulties Affording Meds based on screening of functional status. She has Walking problems based on screening of functional status. She has problems with Memory based on screening of functional status.       Depression Screening (PHQ):  PHQ-2 SCORE: 0  , done 6/18/2025            Advanced Directives:   She does have a Living Will but we do NOT have it on file in Epic.    She does have a POA but we do NOT have it on file in [facility].    Patient has Advance Care Planning documents present in EMR. Reviewed documents with patient (and family/surrogate if present).      Problem List[1]  Allergies:  She is allergic to sulfa antibiotics, atorvastatin, irbesartan, rosuvastatin, amoxicillin, bactrim, clavulanic acid, codeine, cefprozil, and lisinopril.    Current Medications:  Active Meds, Sig Only[2]    Medical History:  She  has a past medical history of Acute subendocardial infarction, subsequent episode of care (HCC) (11/25/2020), Acute, but ill-defined, cerebrovascular disease, Anxiety, Atherosclerosis of coronary artery, Carotid stenosis, Carpal tunnel syndrome, right (5/21/2016), Diabetes (HCC), Esophageal reflux, Lumbar spinal stenosis, Obesity,  Osteoarthritis, Other and unspecified hyperlipidemia, Rheumatoid arthritis (HCC), Status post carotid endarterectomy (4/13/2015), Type II or unspecified type diabetes mellitus without mention of complication, not stated as uncontrolled, and Unspecified essential hypertension.  Surgical History:  She  has a past surgical history that includes angioplasty (coronary); electrocardiogram, complete (03-); colonoscopy; cholecystectomy; knee replacement surgery; other surgical history; and anesth,surgery of shoulder (Right).   Family History:  Her family history includes Heart Disease in her mother; Hypertension in her mother; Kidney Disease in her father; Lipids in her mother.  Social History:  She  reports that she quit smoking about 30 years ago. Her smoking use included cigarettes. She has been exposed to tobacco smoke. She has never used smokeless tobacco. She reports that she does not drink alcohol and does not use drugs.    Tobacco:  She smoked tobacco in the past but quit greater than 12 months ago.  Tobacco Use[3]     CAGE Alcohol Screen:   CAGE screening score of 0 on 6/18/2025, showing low risk of alcohol abuse.      Patient Care Team:  Narayan Marmolejo MD as PCP - General (Internal Medicine)  Chacorta He (SURGERY, ORTHOPEDIC)  Naveen Vazquez MD (RHEUMATOLOGY)  Deidra Hidalgo (UROLOGY)  Jaron Maharaj MD (UROLOGY)  Chacorta He (SURGERY, ORTHOPEDIC)  Naveen Law (SURGERY, ORTHOPEDIC)  Joe Lowry (CARDIOLOGY)  Teofilo Jean Baptiste as Gastroenterologist (GASTROENTEROLOGY)  Manolo Woodard DPM (PODIATRIST)  Ezra Stallings (OBSTETRICS & GYNECOLOGY)  Nella Tello APRN (Nurse Practitioner Family)    Review of Systems  GENERAL: feels well otherwise  SKIN: denies any unusual skin lesions  EYES: denies blurred vision or double vision  HEENT: denies nasal congestion, sinus pain or ST  LUNGS: denies shortness of breath with exertion  CARDIOVASCULAR: denies chest pain on  exertion  GI: denies abdominal pain, denies heartburn  : denies dysuria, vaginal discharge or itching, no complaint of urinary incontinence   MUSCULOSKELETAL: denies back pain  NEURO: denies headaches  PSYCHE: denies depression or anxiety  HEMATOLOGIC: denies hx of anemia  ENDOCRINE: denies thyroid history  ALL/ASTHMA: denies hx of allergy or asthma    Objective:   Physical Exam  General Appearance:  Alert, cooperative, no distress, appears stated age   Head:  Normocephalic, without obvious abnormality, atraumatic   Eyes:  PERRL, conjunctiva/corneas clear, EOM's intact both eyes   Ears:  Normal TM's and external ear canals, both ears   Nose: Nares normal, septum midline,mucosa normal, no drainage or sinus tenderness   Throat: Lips, mucosa, and tongue normal; teeth and gums normal   Neck: Supple, symmetrical, trachea midline, no adenopathy;  thyroid: not enlarged, symmetric, no tenderness/mass/nodules; no carotid bruit or JVD   Back:   Symmetric, no curvature, ROM normal, no CVA tenderness   Lungs:   Clear to auscultation bilaterally, respirations unlabored   Heart:  Regular rate and rhythm, S1 and S2 normal, no murmur, rub, or gallop   Abdomen:   Soft, non-tender, bowel sounds active all four quadrants,  no masses, no organomegaly   Pelvic: Deferred   Extremities: Extremities normal, atraumatic, no cyanosis or edema   Pulses: 2+ and symmetric   Skin: Skin color, texture, turgor normal, no rashes or lesions   Lymph nodes: Cervical, supraclavicular, and axillary nodes normal   Neurologic: Normal       /68 (BP Location: Left arm, Patient Position: Sitting, Cuff Size: large)   Pulse 88   Ht 5' 2\" (1.575 m)   Wt 166 lb 6 oz (75.5 kg)   BMI 30.43 kg/m²  Estimated body mass index is 30.43 kg/m² as calculated from the following:    Height as of this encounter: 5' 2\" (1.575 m).    Weight as of this encounter: 166 lb 6 oz (75.5 kg).    Medicare Hearing Assessment:   Hearing Screening    Screening Method: Finger  Rub  Finger Rub Result: Fail (Comment: Cannot hear from right ear)         Visual Acuity:   Right Eye Visual Acuity: Corrected Right Eye Chart Acuity: 20/30   Left Eye Visual Acuity: Corrected Left Eye Chart Acuity: 20/40   Both Eyes Visual Acuity: Corrected Both Eyes Chart Acuity: 20/25   Able To Tolerate Visual Acuity: Yes        Assessment & Plan:   Lucie Schuster is a 89 year old female who presents for a Medicare Assessment.     (Z00.00) Medicare annual wellness visit, subsequent  (primary encounter diagnosis)  Plan: pt advised to get her covid booster and shingrix vaccine    (E11.9) Controlled type 2 diabetes mellitus without complication, without long-term current use of insulin (HCC)  Plan: POC Glycohemoglobin [56839]        A1c at 5.3 so her dm well controlled now with just diet alone.  Cpm.    (I25.10) Coronary arteriosclerosis in native artery  Plan: asymptomatic; had been on asa and chol med     (E11.59,  I15.2) Hypertension associated with type 2 diabetes mellitus (HCC)  Plan: bp controlled. Cpm.     (E11.69,  E78.5) Hyperlipidemia associated with type 2 diabetes mellitus (HCC)  Plan: controlled with chol med. Cpm.     (N18.31) Stage 3a chronic kidney disease (HCC)  Plan: continue to avoid nsaids and continued control of hypertension.     (M06.9) Rheumatoid arthritis involving multiple sites, unspecified whether rheumatoid factor present (HCC)  Plan: pt had been on methotrexate managed by her rheumatologist and had been stabl    (K21.9) Gastroesophageal reflux disease, unspecified whether esophagitis present  Plan: stable on PPI    (I50.32) Chronic diastolic heart failure (HCC)  Plan: pt now euvolemic and compensated ; she is on bp meds which also helps diastolic heart failure. Sh continues to ffup with [facility] cardio [facility]    (I70.0) Thoracic aortic atherosclerosis  Plan: pt already on statin.     (I73.9) Peripheral vascular disease  Plan: pt on asa and chol med.     (Z91.81) Risk for  falls  Plan: fall precuations     (Z98.890) S/P carotid endarterectomy  Plan: pt  asymptmatic and  continues to be on chol med and asa.     (Z01.00,  E11.9) Diabetic eye exam (HCC)  Plan: CANCELED: Ophthalmology Referral - In Network        Pt had already seen her optha last month per pt and no dm retinopathy    (F41.1) Generalized anxiety disorder  Plan: pt stable on xanax.              The patient indicates understanding of these issues and agrees to the plan.  Reinforced healthy diet, lifestyle, and exercise.      No follow-ups on file.     Narayan Marmolejo MD, 6/20/2025     Supplementary Documentation:   General Health:  In the past six months, have you lost more than 10 pounds without trying?: 2 - No  Has your appetite been poor?: No  Type of Diet: Diabetic  How does the patient maintain a good energy level?: Stretching  How would you describe your daily physical activity?: Light  How would you describe your current health state?: Good  How do you maintain positive mental well-being?: Visiting Friends, Visiting Family  On a scale of 0 to 10, with 0 being no pain and 10 being severe pain, what is your pain level?: 4 - (Moderate)  In the past six months, have you experienced urine leakage?: 1-Yes  At any time do you feel concerned for the safety/well-being of yourself and/or your children, in your home or elsewhere?: No  Have you had any immunizations at another office such as Influenza, Hepatitis B, Tetanus, or Pneumococcal?: Yes    Health Maintenance   Topic Date Due    Diabetes Care Dilated Eye Exam  08/20/2022    COVID-19 Vaccine (4 - 2024-25 season) 09/01/2024    Zoster Vaccines (2 of 2) 10/12/2024    Diabetes Care: Microalb/Creat Ratio (Annual)  01/01/2025    Diabetes Care Foot Exam  06/20/2026 (Originally 8/24/2022)    Diabetes Care: GFR  08/17/2025    Diabetes Care A1C  12/18/2025    Annual Physical  06/18/2026    Influenza Vaccine  Completed    Annual Depression Screening  Completed    Fall Risk  Screening (Annual)  Completed    Pneumococcal Vaccine: 50+ Years  Completed    Meningococcal B Vaccine  Aged Out            [1]   Patient Active Problem List  Diagnosis    Hypertension associated with type 2 diabetes mellitus (HCC)    Hyperlipidemia associated with type 2 diabetes mellitus (HCC)    Controlled type 2 diabetes mellitus without complication, without long-term current use of insulin (HCC)    Generalized anxiety disorder    Medicare annual wellness visit, subsequent    Rheumatoid arthritis involving multiple sites ([facility])    Risk for falls    Coronary arteriosclerosis in native artery    Stage 3a chronic kidney disease (HCC)    Peripheral vascular disease    Gastroesophageal reflux disease    Thoracic aortic atherosclerosis    Rectal bleeding    Chronic rhinitis    S/P carotid endarterectomy    Urinary frequency    Diabetic eye exam ([facility])    Chronic diastolic heart failure ([facility])   [2]   Outpatient Medications Marked as Taking for the 6/18/25 encounter (Office Visit) with Narayan Marmolejo MD   Medication Sig    [DISCONTINUED] ALPRAZolam 0.25 MG Oral Tab Take 1 tablet (0.25 mg total) by mouth daily as needed for Anxiety.    gabapentin 300 MG Oral Cap Take 1 capsule (300 mg total) by mouth 3 (three) times daily.    predniSONE 10 MG Oral Tab     APPLE CIDER VINEGAR OR Take 450 mg by mouth.    ezetimibe 10 MG Oral Tab Take 1 tablet (10 mg total) by mouth nightly.    amLODIPine 5 MG Oral Tab Take 1 tablet (5 mg total) by mouth in the morning and 1 tablet (5 mg total) before bedtime.    omeprazole 20 MG Oral Capsule Delayed Release Take 1 capsule (20 mg total) by mouth before breakfast.    folic acid 1 MG Oral Tab Take 1 tablet (1 mg total) by mouth daily.    losartan 100 MG Oral Tab     furosemide 20 MG Oral Tab Take 1 tablet (20 mg total) by mouth daily as needed.    aspirin 81 MG Oral Tab Take 1 tablet (81 mg total) by mouth daily.    methotrexate (RHEUMATREX) 2.5 MG Oral Tab Take 1 tablet (2.5 mg total)  by mouth. TAKE 6 TAB ONCE A WEEK. PATIENT TAKING EVERY Thursday.    Red Yeast Rice 600 MG Oral Cap Take 2 tablets by mouth daily.   [3]   Social History  Tobacco Use   Smoking Status Former    Current packs/day: 0.00    Types: Cigarettes    Quit date: 1995    Years since quittin.4    Passive exposure: Past   Smokeless Tobacco Never

## 2025-08-16 DIAGNOSIS — E11.69 HYPERLIPIDEMIA ASSOCIATED WITH TYPE 2 DIABETES MELLITUS (HCC): ICD-10-CM

## 2025-08-16 DIAGNOSIS — K21.9 GASTROESOPHAGEAL REFLUX DISEASE, UNSPECIFIED WHETHER ESOPHAGITIS PRESENT: ICD-10-CM

## 2025-08-16 DIAGNOSIS — E78.5 HYPERLIPIDEMIA ASSOCIATED WITH TYPE 2 DIABETES MELLITUS (HCC): ICD-10-CM

## 2025-08-20 RX ORDER — OMEPRAZOLE 20 MG/1
20 CAPSULE, DELAYED RELEASE ORAL
Qty: 90 CAPSULE | Refills: 3 | Status: SHIPPED | OUTPATIENT
Start: 2025-08-20

## 2025-08-20 RX ORDER — EZETIMIBE 10 MG/1
10 TABLET ORAL NIGHTLY
Qty: 90 TABLET | Refills: 3 | Status: SHIPPED | OUTPATIENT
Start: 2025-08-20

## (undated) DIAGNOSIS — F41.1 GENERALIZED ANXIETY DISORDER: ICD-10-CM

## (undated) DIAGNOSIS — E11.59 HYPERTENSION ASSOCIATED WITH TYPE 2 DIABETES MELLITUS (HCC): ICD-10-CM

## (undated) DIAGNOSIS — E11.69 HYPERLIPIDEMIA ASSOCIATED WITH TYPE 2 DIABETES MELLITUS (HCC): ICD-10-CM

## (undated) DIAGNOSIS — I25.10 CORONARY ARTERIOSCLEROSIS IN NATIVE ARTERY: ICD-10-CM

## (undated) DIAGNOSIS — E11.9 CONTROLLED TYPE 2 DIABETES MELLITUS WITHOUT COMPLICATION, WITHOUT LONG-TERM CURRENT USE OF INSULIN (HCC): ICD-10-CM

## (undated) DIAGNOSIS — E66.01 MORBID (SEVERE) OBESITY DUE TO EXCESS CALORIES (HCC): ICD-10-CM

## (undated) DIAGNOSIS — E78.5 HYPERLIPIDEMIA ASSOCIATED WITH TYPE 2 DIABETES MELLITUS (HCC): ICD-10-CM

## (undated) DIAGNOSIS — I15.2 HYPERTENSION ASSOCIATED WITH TYPE 2 DIABETES MELLITUS (HCC): ICD-10-CM

## (undated) DIAGNOSIS — M06.9 RHEUMATOID ARTHRITIS INVOLVING MULTIPLE SITES, UNSPECIFIED WHETHER RHEUMATOID FACTOR PRESENT (HCC): ICD-10-CM

## (undated) DIAGNOSIS — K21.9 GASTROESOPHAGEAL REFLUX DISEASE, UNSPECIFIED WHETHER ESOPHAGITIS PRESENT: ICD-10-CM

## (undated) NOTE — LETTER
Lucie Schuster (Legal Name)     88 year old, 1935    Legal sex: Female    Marital status:     Race: White    Ethnicity: NON  OR  OR  ETHNICITY    Languages    English    [address] [facility] [address]    MRN: [id number]    CSN: [id number]     Contact Information  [phone] (Home Phone)   [phone] (Mobile)                                                                               [address] [address]  Ph:[phone]  Fax:[phone]         Patient Information:  Patient Name:   Address: Lucie Schuster, ([id number])  [address] [facility] [address]  [phone] (home)  Sex: female          : 1935   ________________________________________________________________  Referral Information:  Order Date: 2024       Epic Order #: [id number]   Referral Type: DME - External Dx: Acute on chronic diastolic heart failure (HCC) (I50.33)   Signed Referral [place]:        Comments: 3 Liter  Compact portable Oxygen tank  NP#[id number]  Number of Visits: 5   ______________________________________________________________  Scheduling Information:           **REFERRAL REQUEST**     Your physician has referred you to a specialist.  Your physician or the clinic staff will provide you with the phone number you should call to schedule your appointment.      If you are confident that your benefit plan will not require a referral or authorization, such as [place] Medicaid, please feel free to schedule your appointment immediately. However, if you are unsure about the requirements for authorization, please wait 5-7 days and then contact your physician's office. At that time, you will be provided with any authorization numbers or be assured that none are required. You can then schedule your appointment. Failure to obtain required authorization numbers can create reimbursement difficulties for you.      _______________________________________________________         Referred to Location Information      Location Address [facility] Phone     [facility] [address] [place] [address] [place] [address] [phone]          Coverage Information:      Active Insurance as of 1/17/2024             Primary Coverage      Payor Plan Insurance Group Employer/Plan Group     MEDICARE MEDICARE PART B ONLY [id number]       Payor Plan Address Payor Plan Phone Number Payor Plan Fax Number Effective Dates     [address]     7/1/2002 - None Entered     [facility] [id number]           Subscriber Name Subscriber Birth Date Member ID          TALIB QUIROZ 7/16/1935 [id number]       Guarantor Name (ID) Guarantor Birth Date Guarantor Address Guarantor Type     TALIB QUIROZ ([id number]) 7/16/1935 [address] Personal/Family         [facility] [address]                     Secondary Coverage      Payor Plan Insurance Group Employer/Plan Group     [place] [id number]       Payor Address Payor Phone Number Payor Fax Number Effective Dates     [address]     9/1/2023 - None Entered     [facility] [address]           Subscriber Name Subscriber Birth Date Member ID          TALIB QUIROZ 7/16/1935 [id number]       Guarantor Name (ID) Guarantor Birth Date Guarantor Address Guarantor Type     TALIB QUIROZ ([id number]) 7/16/1935 [address] Personal/Family         [facility] [address]                   Electronically Signed By: Narayan Marmolejo MD [NPI: [id number]]  Order Date: Jan 18, 2024 at 12:35 PM

## (undated) NOTE — LETTER
6/13/2024      To Whom It May Concern:    Lucie Schuster is currently under my medical care.   Patient may discontinue used of oxygen.   Please let us know if you need further assistance.       If you require additional information please contact our office.      Sincerely,    Narayan Marmolejo MD  [address] [place] [address]   [phone]      Document generated by:  Kate ELIZABETH RN BSN

## (undated) NOTE — MR AVS SNAPSHOT
[address]  [address] [facility]  [phone]               Thank you for choosing us for your health care visit with Newton Varghese MD.  We are glad to serve you and happy to provide you with this s Commonly known as:  FOLVITE           gabapentin 300 MG Caps   Take 1 capsule (300 mg total) by mouth 3 (three) times daily.    Commonly known as:  NEURONTIN           GlipiZIDE ER 5 MG Tb24   TAKE 1 TABLET BY MOUTH EVERY DAY IN THE MORNING WITH BREAKFAST [facility] (Blue Parking) (Yellow Parking)  [address]   [address] [facility],  [place], [address] [facility]  [address] [facility]  [address] [place] [address]    [facility]

## (undated) NOTE — LETTER
08/14/21        [address] [id number]      Dear Jayme Cortez records indicate that you have outstanding lab work and or testing that was ordered for you and has not yet been completed:  Orders Placed This Encounter      XR

## (undated) NOTE — Clinical Note
Patient Name: Tammie Amador  : 1935  MRN: [id number]  Patient Address: [address] [place] [id number]      Coronavirus Disease 2019 (COVID-19)     [facility] is committed to the safety and well-being of our patients, members, Karlie Main 2. Monitor your symptoms carefully. If your symptoms get worse, call your healthcare provider immediately. 3. Get rest and stay hydrated.    4. If you have a medical appointment, call the healthcare provider ahead of time and tell them that you have or may ? At least 24 hours have passed since recovery defined as resolution of fever without the use of fever-reducing medications; and  · Improvement in respiratory symptoms (e.g., cough, shortness of breath); and  · At least 10 days have passed since symptoms f If you would be interested in donating your plasma to help treat others diagnosed with the virus, please contact [facility] directly on their website: [email or web address]    Who is eligible to donate convalescent plasma?

## (undated) NOTE — LETTER
6/14/2024      To Whom It May Concern:    Lucie Schuster is currently under my medical care.  Please discontinue patient's home oxygen.    If you require additional information please contact our office.      Sincerely,    Narayan Marmolejo MD  [address] [place] [address]   [phone]      Document generated by:  Susannah NORMAN RN

## (undated) NOTE — Clinical Note
Patient Name: Natali Mott  : 1935  MRN: [id number]  Patient Address: [address] [place] [id number]      Coronavirus Disease 2019 (COVID-19)     [facility] is committed to the safety and well-being of our patients, members, Emmanuel Smiley 2. Monitor your symptoms carefully. If your symptoms get worse, call your healthcare provider immediately. 3. Get rest and stay hydrated.    4. If you have a medical appointment, call the healthcare provider ahead of time and tell them that you have or may ? At least 24 hours have passed since recovery defined as resolution of fever without the use of fever-reducing medications; and  · Improvement in respiratory symptoms (e.g., cough, shortness of breath); and  · At least 10 days have passed since symptoms f If you would be interested in donating your plasma to help treat others diagnosed with the virus, please contact [facility] directly on their website: [email or web address]    Who is eligible to donate convalescent plasma?

## (undated) NOTE — LETTER
1/29/2025    Lucie Schuster  [address] [facility] [address]         Dear Lucie,    This letter is to inform you that our office has made several attempts to reach you by phone without success.  We were attempting to contact you by phone regarding prescription request    Please contact our office at the number listed below as soon as you receive this letter to discuss this issue and to make the necessary changes in our system to your contact information.  Thank you for your cooperation.        Sincerely,    Narayan Marmolejo MD  [address] [place] [address]  Ph: [phone]  Fax: [phone]         Document electronically generated by:  Kitty SCHMITT RN

## (undated) NOTE — LETTER
November 22, 2019    [address] [place] [address]  [id number] [facility] [address]      Dear Scout Schulte:   It was a pleasure speaking with you over the phone recently regarding our Chronic Care Management program. To follow up, I wanted to send you some contact infor

## (undated) NOTE — MR AVS SNAPSHOT
[facility] [address] [place] [address]  [phone]               Thank you for choosing us for your health care visit with Dain Weaver MD.  We are glad to serve you and happy to provide you with this summar authorization numbers or be assured that none are required. You can then schedule your appointment. Failure to obtain required authorization numbers can create reimbursement difficulties for you.     Functional Status questions complete?:      Assoc Dx:  Rh GlipiZIDE ER 5 MG Tb24   TAKE 1 TABLET BY MOUTH EVERY DAY IN THE MORNING WITH BREAKFAST   Commonly known as:  GLIPIZIDE XL           HYDROcodone-acetaminophen 5-325 MG Tabs   Take one tab po at night   Commonly known as:  NORCO           methotrexate 2.5 Your unique [employer] Access Code: [email or web address]  Expires: 6/12/2017 12:50 PM    If you have questions, you can call [phone] to talk to our [facility] Staff. Remember, [employer] is NOT to be used for urgent needs. For medical emergencies, dial 911.

## (undated) NOTE — LETTER
05/29/19    [address] [place] [phone]    Dear Sugey Pizarro: It was a pleasure speaking with you over the phone recently.  To follow up, I wanted to send you my contact information to utilize when you have a question and or need some assi